# Patient Record
Sex: FEMALE | Race: WHITE | NOT HISPANIC OR LATINO | Employment: OTHER | URBAN - METROPOLITAN AREA
[De-identification: names, ages, dates, MRNs, and addresses within clinical notes are randomized per-mention and may not be internally consistent; named-entity substitution may affect disease eponyms.]

---

## 2017-04-27 ENCOUNTER — HOSPITAL ENCOUNTER (EMERGENCY)
Facility: HOSPITAL | Age: 82
Discharge: HOME | End: 2017-04-27
Attending: EMERGENCY MEDICINE | Admitting: EMERGENCY MEDICINE

## 2017-04-27 VITALS
HEIGHT: 62 IN | WEIGHT: 144 LBS | HEART RATE: 93 BPM | SYSTOLIC BLOOD PRESSURE: 166 MMHG | BODY MASS INDEX: 26.5 KG/M2 | DIASTOLIC BLOOD PRESSURE: 66 MMHG | OXYGEN SATURATION: 92 % | TEMPERATURE: 98.7 F | RESPIRATION RATE: 20 BRPM

## 2017-04-27 DIAGNOSIS — L27.0 DRUG RASH: ICD-10-CM

## 2017-04-27 DIAGNOSIS — N39.0 ACUTE UTI: Primary | ICD-10-CM

## 2017-04-27 LAB
ANION GAP SERPL CALCULATED.3IONS-SCNC: 16.1 MMOL/L
BACTERIA UR QL AUTO: ABNORMAL /HPF
BASOPHILS # BLD AUTO: 0.02 10*3/MM3 (ref 0–0.2)
BASOPHILS NFR BLD AUTO: 0.2 % (ref 0–2)
BILIRUB UR QL STRIP: ABNORMAL
BUN BLD-MCNC: 12 MG/DL (ref 8–23)
BUN/CREAT SERPL: 15 (ref 7–25)
CALCIUM SPEC-SCNC: 8.5 MG/DL (ref 8.8–10.5)
CHLORIDE SERPL-SCNC: 99 MMOL/L (ref 98–107)
CLARITY UR: ABNORMAL
CO2 SERPL-SCNC: 22.9 MMOL/L (ref 22–29)
COLOR UR: YELLOW
CREAT BLD-MCNC: 0.8 MG/DL (ref 0.57–1)
DEPRECATED RDW RBC AUTO: 42.6 FL (ref 37–54)
EOSINOPHIL # BLD AUTO: 0.47 10*3/MM3 (ref 0.1–0.3)
EOSINOPHIL NFR BLD AUTO: 4.9 % (ref 0–4)
ERYTHROCYTE [DISTWIDTH] IN BLOOD BY AUTOMATED COUNT: 13 % (ref 11.5–14.5)
GFR SERPL CREATININE-BSD FRML MDRD: 68 ML/MIN/1.73
GLUCOSE BLD-MCNC: 99 MG/DL (ref 65–99)
GLUCOSE UR STRIP-MCNC: NEGATIVE MG/DL
HCT VFR BLD AUTO: 39.1 % (ref 37–47)
HGB BLD-MCNC: 13 G/DL (ref 12–16)
HGB UR QL STRIP.AUTO: ABNORMAL
HYALINE CASTS UR QL AUTO: ABNORMAL /LPF
IMM GRANULOCYTES # BLD: 0.07 10*3/MM3 (ref 0–0.03)
IMM GRANULOCYTES NFR BLD: 0.7 % (ref 0–0.5)
KETONES UR QL STRIP: ABNORMAL
LEUKOCYTE ESTERASE UR QL STRIP.AUTO: ABNORMAL
LYMPHOCYTES # BLD AUTO: 0.32 10*3/MM3 (ref 0.6–4.8)
LYMPHOCYTES NFR BLD AUTO: 3.3 % (ref 20–45)
MCH RBC QN AUTO: 30 PG (ref 27–31)
MCHC RBC AUTO-ENTMCNC: 33.2 G/DL (ref 31–37)
MCV RBC AUTO: 90.1 FL (ref 81–99)
MONOCYTES # BLD AUTO: 0.36 10*3/MM3 (ref 0–1)
MONOCYTES NFR BLD AUTO: 3.7 % (ref 3–8)
NEUTROPHILS # BLD AUTO: 8.39 10*3/MM3 (ref 1.5–8.3)
NEUTROPHILS NFR BLD AUTO: 87.2 % (ref 45–70)
NITRITE UR QL STRIP: NEGATIVE
NRBC BLD MANUAL-RTO: 0 /100 WBC (ref 0–0)
PH UR STRIP.AUTO: 5.5 [PH] (ref 4.5–8)
PLATELET # BLD AUTO: 216 10*3/MM3 (ref 140–500)
PMV BLD AUTO: 9.9 FL (ref 7.4–10.4)
POTASSIUM BLD-SCNC: 4.2 MMOL/L (ref 3.5–5.2)
PROT UR QL STRIP: ABNORMAL
RBC # BLD AUTO: 4.34 10*6/MM3 (ref 4.2–5.4)
RBC # UR: ABNORMAL /HPF
REF LAB TEST METHOD: ABNORMAL
SODIUM BLD-SCNC: 138 MMOL/L (ref 136–145)
SP GR UR STRIP: 1.02 (ref 1–1.03)
SQUAMOUS #/AREA URNS HPF: ABNORMAL /HPF
UROBILINOGEN UR QL STRIP: ABNORMAL
WBC NRBC COR # BLD: 9.63 10*3/MM3 (ref 4.8–10.8)
WBC UR QL AUTO: ABNORMAL /HPF

## 2017-04-27 PROCEDURE — 80048 BASIC METABOLIC PNL TOTAL CA: CPT | Performed by: EMERGENCY MEDICINE

## 2017-04-27 PROCEDURE — 25010000002 ONDANSETRON PER 1 MG: Performed by: EMERGENCY MEDICINE

## 2017-04-27 PROCEDURE — 96375 TX/PRO/DX INJ NEW DRUG ADDON: CPT

## 2017-04-27 PROCEDURE — 96365 THER/PROPH/DIAG IV INF INIT: CPT

## 2017-04-27 PROCEDURE — 99284 EMERGENCY DEPT VISIT MOD MDM: CPT | Performed by: EMERGENCY MEDICINE

## 2017-04-27 PROCEDURE — 85025 COMPLETE CBC W/AUTO DIFF WBC: CPT | Performed by: EMERGENCY MEDICINE

## 2017-04-27 PROCEDURE — 87086 URINE CULTURE/COLONY COUNT: CPT | Performed by: EMERGENCY MEDICINE

## 2017-04-27 PROCEDURE — 25010000002 CEFTRIAXONE PER 250 MG: Performed by: EMERGENCY MEDICINE

## 2017-04-27 PROCEDURE — 99283 EMERGENCY DEPT VISIT LOW MDM: CPT

## 2017-04-27 PROCEDURE — 81001 URINALYSIS AUTO W/SCOPE: CPT | Performed by: EMERGENCY MEDICINE

## 2017-04-27 PROCEDURE — 96361 HYDRATE IV INFUSION ADD-ON: CPT

## 2017-04-27 RX ORDER — NITROFURANTOIN MACROCRYSTALS 100 MG/1
100 CAPSULE ORAL EVERY 12 HOURS
COMMUNITY
End: 2017-05-04 | Stop reason: HOSPADM

## 2017-04-27 RX ORDER — ESOMEPRAZOLE MAGNESIUM 40 MG/1
20 CAPSULE, DELAYED RELEASE ORAL NIGHTLY
COMMUNITY
End: 2018-03-02 | Stop reason: ALTCHOICE

## 2017-04-27 RX ORDER — CEFUROXIME AXETIL 500 MG/1
500 TABLET ORAL 2 TIMES DAILY
Qty: 14 TABLET | Refills: 0 | Status: SHIPPED | OUTPATIENT
Start: 2017-04-27 | End: 2017-05-04 | Stop reason: HOSPADM

## 2017-04-27 RX ORDER — SODIUM CHLORIDE 0.9 % (FLUSH) 0.9 %
10 SYRINGE (ML) INJECTION AS NEEDED
Status: DISCONTINUED | OUTPATIENT
Start: 2017-04-27 | End: 2017-04-27 | Stop reason: HOSPADM

## 2017-04-27 RX ORDER — ONDANSETRON 2 MG/ML
4 INJECTION INTRAMUSCULAR; INTRAVENOUS ONCE
Status: COMPLETED | OUTPATIENT
Start: 2017-04-27 | End: 2017-04-27

## 2017-04-27 RX ORDER — ONDANSETRON 4 MG/1
4 TABLET, ORALLY DISINTEGRATING ORAL EVERY 6 HOURS PRN
Qty: 20 TABLET | Refills: 0 | Status: SHIPPED | OUTPATIENT
Start: 2017-04-27 | End: 2017-05-20 | Stop reason: HOSPADM

## 2017-04-27 RX ADMIN — CEFTRIAXONE 1 G: 1 INJECTION, SOLUTION INTRAVENOUS at 12:16

## 2017-04-27 RX ADMIN — SODIUM CHLORIDE 1000 ML: 9 INJECTION, SOLUTION INTRAVENOUS at 12:13

## 2017-04-27 RX ADMIN — ONDANSETRON 4 MG: 2 INJECTION, SOLUTION INTRAMUSCULAR; INTRAVENOUS at 12:14

## 2017-04-29 LAB — BACTERIA SPEC AEROBE CULT: NORMAL

## 2017-05-01 ENCOUNTER — APPOINTMENT (OUTPATIENT)
Dept: ULTRASOUND IMAGING | Facility: HOSPITAL | Age: 82
End: 2017-05-01

## 2017-05-01 ENCOUNTER — HOSPITAL ENCOUNTER (INPATIENT)
Facility: HOSPITAL | Age: 82
LOS: 3 days | Discharge: NURSING, REHAB OR LONG-TERM CARE | End: 2017-05-04
Attending: EMERGENCY MEDICINE | Admitting: HOSPITALIST

## 2017-05-01 ENCOUNTER — APPOINTMENT (OUTPATIENT)
Dept: CT IMAGING | Facility: HOSPITAL | Age: 82
End: 2017-05-01

## 2017-05-01 DIAGNOSIS — R13.12 OROPHARYNGEAL DYSPHAGIA: ICD-10-CM

## 2017-05-01 DIAGNOSIS — I63.9 CEREBROVASCULAR ACCIDENT (CVA), UNSPECIFIED MECHANISM (HCC): Primary | ICD-10-CM

## 2017-05-01 LAB
ALBUMIN SERPL-MCNC: 3.3 G/DL (ref 3.5–5.2)
ALBUMIN/GLOB SERPL: 1.1 G/DL
ALP SERPL-CCNC: 63 U/L (ref 40–129)
ALT SERPL W P-5'-P-CCNC: 23 U/L (ref 5–33)
ANION GAP SERPL CALCULATED.3IONS-SCNC: 12.8 MMOL/L
ANISOCYTOSIS BLD QL: NORMAL
AST SERPL-CCNC: 21 U/L (ref 5–32)
BACTERIA UR QL AUTO: ABNORMAL /HPF
BASOPHILS # BLD AUTO: 0.05 10*3/MM3 (ref 0–0.2)
BASOPHILS NFR BLD AUTO: 0.5 % (ref 0–2)
BILIRUB SERPL-MCNC: 0.3 MG/DL (ref 0.2–1.2)
BILIRUB UR QL STRIP: NEGATIVE
BUN BLD-MCNC: 16 MG/DL (ref 8–23)
BUN/CREAT SERPL: 22.9 (ref 7–25)
CALCIUM SPEC-SCNC: 8.5 MG/DL (ref 8.8–10.5)
CHLORIDE SERPL-SCNC: 101 MMOL/L (ref 98–107)
CLARITY UR: CLEAR
CO2 SERPL-SCNC: 27.2 MMOL/L (ref 22–29)
COD CRY URNS QL: ABNORMAL /HPF
COLOR UR: YELLOW
CREAT BLD-MCNC: 0.7 MG/DL (ref 0.57–1)
DEPRECATED RDW RBC AUTO: 43.3 FL (ref 37–54)
EOSINOPHIL # BLD AUTO: 1.4 10*3/MM3 (ref 0.1–0.3)
EOSINOPHIL NFR BLD AUTO: 14.7 % (ref 0–4)
ERYTHROCYTE [DISTWIDTH] IN BLOOD BY AUTOMATED COUNT: 13.1 % (ref 11.5–14.5)
GFR SERPL CREATININE-BSD FRML MDRD: 79 ML/MIN/1.73
GLOBULIN UR ELPH-MCNC: 2.9 GM/DL
GLUCOSE BLD-MCNC: 97 MG/DL (ref 65–99)
GLUCOSE BLDC GLUCOMTR-MCNC: 91 MG/DL (ref 70–130)
GLUCOSE UR STRIP-MCNC: NEGATIVE MG/DL
HCT VFR BLD AUTO: 35 % (ref 37–47)
HGB BLD-MCNC: 11.5 G/DL (ref 12–16)
HGB UR QL STRIP.AUTO: NEGATIVE
HYALINE CASTS UR QL AUTO: ABNORMAL /LPF
IMM GRANULOCYTES # BLD: 0.11 10*3/MM3 (ref 0–0.03)
IMM GRANULOCYTES NFR BLD: 1.2 % (ref 0–0.5)
KETONES UR QL STRIP: NEGATIVE
LEUKOCYTE ESTERASE UR QL STRIP.AUTO: NEGATIVE
LYMPHOCYTES # BLD AUTO: 2.02 10*3/MM3 (ref 0.6–4.8)
LYMPHOCYTES NFR BLD AUTO: 21.2 % (ref 20–45)
MCH RBC QN AUTO: 29.8 PG (ref 27–31)
MCHC RBC AUTO-ENTMCNC: 32.9 G/DL (ref 31–37)
MCV RBC AUTO: 90.7 FL (ref 81–99)
MONOCYTES # BLD AUTO: 0.73 10*3/MM3 (ref 0–1)
MONOCYTES NFR BLD AUTO: 7.7 % (ref 3–8)
NEUTROPHILS # BLD AUTO: 5.21 10*3/MM3 (ref 1.5–8.3)
NEUTROPHILS NFR BLD AUTO: 54.7 % (ref 45–70)
NITRITE UR QL STRIP: NEGATIVE
NRBC BLD MANUAL-RTO: 0 /100 WBC (ref 0–0)
PH UR STRIP.AUTO: 5.5 [PH] (ref 4.5–8)
PLAT MORPH BLD: NORMAL
PLATELET # BLD AUTO: 246 10*3/MM3 (ref 140–500)
PMV BLD AUTO: 10.1 FL (ref 7.4–10.4)
POTASSIUM BLD-SCNC: 3.6 MMOL/L (ref 3.5–5.2)
PROT SERPL-MCNC: 6.2 G/DL (ref 6–8.5)
PROT UR QL STRIP: ABNORMAL
RBC # BLD AUTO: 3.86 10*6/MM3 (ref 4.2–5.4)
RBC # UR: ABNORMAL /HPF
REF LAB TEST METHOD: ABNORMAL
SODIUM BLD-SCNC: 141 MMOL/L (ref 136–145)
SP GR UR STRIP: 1.02 (ref 1–1.03)
SQUAMOUS #/AREA URNS HPF: ABNORMAL /HPF
UROBILINOGEN UR QL STRIP: ABNORMAL
WBC MORPH BLD: NORMAL
WBC NRBC COR # BLD: 9.52 10*3/MM3 (ref 4.8–10.8)
WBC UR QL AUTO: ABNORMAL /HPF

## 2017-05-01 PROCEDURE — 81001 URINALYSIS AUTO W/SCOPE: CPT | Performed by: EMERGENCY MEDICINE

## 2017-05-01 PROCEDURE — G0378 HOSPITAL OBSERVATION PER HR: HCPCS

## 2017-05-01 PROCEDURE — 99284 EMERGENCY DEPT VISIT MOD MDM: CPT

## 2017-05-01 PROCEDURE — 82962 GLUCOSE BLOOD TEST: CPT

## 2017-05-01 PROCEDURE — 85025 COMPLETE CBC W/AUTO DIFF WBC: CPT | Performed by: EMERGENCY MEDICINE

## 2017-05-01 PROCEDURE — 85007 BL SMEAR W/DIFF WBC COUNT: CPT | Performed by: EMERGENCY MEDICINE

## 2017-05-01 PROCEDURE — 70450 CT HEAD/BRAIN W/O DYE: CPT

## 2017-05-01 PROCEDURE — 93005 ELECTROCARDIOGRAM TRACING: CPT | Performed by: EMERGENCY MEDICINE

## 2017-05-01 PROCEDURE — 99284 EMERGENCY DEPT VISIT MOD MDM: CPT | Performed by: EMERGENCY MEDICINE

## 2017-05-01 PROCEDURE — 80053 COMPREHEN METABOLIC PANEL: CPT | Performed by: EMERGENCY MEDICINE

## 2017-05-01 PROCEDURE — 99219 PR INITIAL OBSERVATION CARE/DAY 50 MINUTES: CPT | Performed by: HOSPITALIST

## 2017-05-01 PROCEDURE — 94799 UNLISTED PULMONARY SVC/PX: CPT

## 2017-05-01 PROCEDURE — 93970 EXTREMITY STUDY: CPT

## 2017-05-01 PROCEDURE — 93010 ELECTROCARDIOGRAM REPORT: CPT | Performed by: INTERNAL MEDICINE

## 2017-05-01 RX ORDER — CARVEDILOL 3.12 MG/1
3.12 TABLET ORAL EVERY 12 HOURS SCHEDULED
Status: DISCONTINUED | OUTPATIENT
Start: 2017-05-01 | End: 2017-05-04 | Stop reason: HOSPADM

## 2017-05-01 RX ORDER — SODIUM CHLORIDE 0.9 % (FLUSH) 0.9 %
1-10 SYRINGE (ML) INJECTION AS NEEDED
Status: DISCONTINUED | OUTPATIENT
Start: 2017-05-01 | End: 2017-05-04 | Stop reason: HOSPADM

## 2017-05-01 RX ORDER — CEFUROXIME AXETIL 250 MG/1
500 TABLET ORAL EVERY 12 HOURS SCHEDULED
Status: COMPLETED | OUTPATIENT
Start: 2017-05-01 | End: 2017-05-04

## 2017-05-01 RX ORDER — ASPIRIN 300 MG/1
300 SUPPOSITORY RECTAL DAILY
Status: DISCONTINUED | OUTPATIENT
Start: 2017-05-02 | End: 2017-05-01

## 2017-05-01 RX ORDER — ATORVASTATIN CALCIUM 20 MG/1
20 TABLET, FILM COATED ORAL NIGHTLY
Status: DISCONTINUED | OUTPATIENT
Start: 2017-05-01 | End: 2017-05-02

## 2017-05-01 RX ORDER — ASPIRIN 325 MG
325 TABLET ORAL ONCE
Status: COMPLETED | OUTPATIENT
Start: 2017-05-01 | End: 2017-05-01

## 2017-05-01 RX ORDER — ASPIRIN 325 MG
325 TABLET ORAL DAILY
Status: DISCONTINUED | OUTPATIENT
Start: 2017-05-02 | End: 2017-05-02

## 2017-05-01 RX ORDER — LEVOTHYROXINE SODIUM 0.07 MG/1
75 TABLET ORAL
Status: DISCONTINUED | OUTPATIENT
Start: 2017-05-02 | End: 2017-05-04 | Stop reason: HOSPADM

## 2017-05-01 RX ORDER — SODIUM CHLORIDE 0.9 % (FLUSH) 0.9 %
10 SYRINGE (ML) INJECTION AS NEEDED
Status: DISCONTINUED | OUTPATIENT
Start: 2017-05-01 | End: 2017-05-04 | Stop reason: HOSPADM

## 2017-05-01 RX ADMIN — ATORVASTATIN CALCIUM 20 MG: 20 TABLET, FILM COATED ORAL at 22:26

## 2017-05-01 RX ADMIN — CEFUROXIME AXETIL 500 MG: 250 TABLET ORAL at 22:26

## 2017-05-01 RX ADMIN — CARVEDILOL 3.12 MG: 3.12 TABLET, FILM COATED ORAL at 22:27

## 2017-05-01 RX ADMIN — ASPIRIN 325 MG: 325 TABLET, COATED ORAL at 13:22

## 2017-05-02 ENCOUNTER — APPOINTMENT (OUTPATIENT)
Dept: CARDIOLOGY | Facility: HOSPITAL | Age: 82
End: 2017-05-02
Attending: HOSPITALIST

## 2017-05-02 ENCOUNTER — APPOINTMENT (OUTPATIENT)
Dept: MRI IMAGING | Facility: HOSPITAL | Age: 82
End: 2017-05-02

## 2017-05-02 ENCOUNTER — APPOINTMENT (OUTPATIENT)
Dept: GENERAL RADIOLOGY | Facility: HOSPITAL | Age: 82
End: 2017-05-02

## 2017-05-02 LAB
AORTIC ARCH: 2.2 CM
ASCENDING AORTA: 2.7 CM
BH CV ECHO MEAS - ACS: 1.4 CM
BH CV ECHO MEAS - AI DEC SLOPE: 182 CM/SEC^2
BH CV ECHO MEAS - AI MAX PG: 53.6 MMHG
BH CV ECHO MEAS - AI MAX VEL: 366 CM/SEC
BH CV ECHO MEAS - AI P1/2T: 589 MSEC
BH CV ECHO MEAS - AO MAX PG (FULL): 5.4 MMHG
BH CV ECHO MEAS - AO MAX PG: 9.9 MMHG
BH CV ECHO MEAS - AO MEAN PG (FULL): 2 MMHG
BH CV ECHO MEAS - AO MEAN PG: 5 MMHG
BH CV ECHO MEAS - AO ROOT AREA (BSA CORRECTED): 1.9
BH CV ECHO MEAS - AO ROOT AREA: 8.6 CM^2
BH CV ECHO MEAS - AO ROOT DIAM: 3.3 CM
BH CV ECHO MEAS - AO V2 MAX: 157 CM/SEC
BH CV ECHO MEAS - AO V2 MEAN: 109 CM/SEC
BH CV ECHO MEAS - AO V2 VTI: 38 CM
BH CV ECHO MEAS - ASC AORTA: 2.7 CM
BH CV ECHO MEAS - AVA(I,A): 2.5 CM^2
BH CV ECHO MEAS - AVA(I,D): 2.5 CM^2
BH CV ECHO MEAS - AVA(V,A): 2.3 CM^2
BH CV ECHO MEAS - AVA(V,D): 2.3 CM^2
BH CV ECHO MEAS - BSA(HAYCOCK): 1.8 M^2
BH CV ECHO MEAS - BSA: 1.7 M^2
BH CV ECHO MEAS - BZI_BMI: 29.3 KILOGRAMS/M^2
BH CV ECHO MEAS - BZI_METRIC_HEIGHT: 154.9 CM
BH CV ECHO MEAS - BZI_METRIC_WEIGHT: 70.3 KG
BH CV ECHO MEAS - CONTRAST EF (2CH): 61.4 ML/M^2
BH CV ECHO MEAS - CONTRAST EF 4CH: 60.7 ML/M^2
BH CV ECHO MEAS - EDV(CUBED): 66.4 ML
BH CV ECHO MEAS - EDV(MOD-SP2): 52.8 ML
BH CV ECHO MEAS - EDV(MOD-SP4): 61.4 ML
BH CV ECHO MEAS - EDV(TEICH): 72.1 ML
BH CV ECHO MEAS - EF(CUBED): 77.3 %
BH CV ECHO MEAS - EF(MOD-SP2): 61.4 %
BH CV ECHO MEAS - EF(MOD-SP4): 60.7 %
BH CV ECHO MEAS - EF(TEICH): 70 %
BH CV ECHO MEAS - ESV(CUBED): 15.1 ML
BH CV ECHO MEAS - ESV(MOD-SP2): 20.4 ML
BH CV ECHO MEAS - ESV(MOD-SP4): 24.1 ML
BH CV ECHO MEAS - ESV(TEICH): 21.7 ML
BH CV ECHO MEAS - FS: 39 %
BH CV ECHO MEAS - IVS/LVPW: 0.9
BH CV ECHO MEAS - IVSD: 0.91 CM
BH CV ECHO MEAS - LAT PEAK E' VEL: 7 CM/SEC
BH CV ECHO MEAS - LV DIASTOLIC VOL/BSA (35-75): 36.2 ML/M^2
BH CV ECHO MEAS - LV MASS(C)D: 122.3 GRAMS
BH CV ECHO MEAS - LV MASS(C)DI: 72.1 GRAMS/M^2
BH CV ECHO MEAS - LV MAX PG: 4.4 MMHG
BH CV ECHO MEAS - LV MEAN PG: 3 MMHG
BH CV ECHO MEAS - LV SYSTOLIC VOL/BSA (12-30): 14.2 ML/M^2
BH CV ECHO MEAS - LV V1 MAX: 105 CM/SEC
BH CV ECHO MEAS - LV V1 MEAN: 75.8 CM/SEC
BH CV ECHO MEAS - LV V1 VTI: 27.5 CM
BH CV ECHO MEAS - LVIDD: 4.1 CM
BH CV ECHO MEAS - LVIDS: 2.5 CM
BH CV ECHO MEAS - LVLD AP2: 5.8 CM
BH CV ECHO MEAS - LVLD AP4: 6.2 CM
BH CV ECHO MEAS - LVLS AP2: 5.3 CM
BH CV ECHO MEAS - LVLS AP4: 5.1 CM
BH CV ECHO MEAS - LVOT AREA (M): 3.5 CM^2
BH CV ECHO MEAS - LVOT AREA: 3.5 CM^2
BH CV ECHO MEAS - LVOT DIAM: 2.1 CM
BH CV ECHO MEAS - LVPWD: 1 CM
BH CV ECHO MEAS - MED PEAK E' VEL: 7 CM/SEC
BH CV ECHO MEAS - MV A DUR: 0.11 SEC
BH CV ECHO MEAS - MV A MAX VEL: 103 CM/SEC
BH CV ECHO MEAS - MV DEC SLOPE: 579 CM/SEC^2
BH CV ECHO MEAS - MV DEC TIME: 0.21 SEC
BH CV ECHO MEAS - MV E MAX VEL: 119 CM/SEC
BH CV ECHO MEAS - MV E/A: 1.2
BH CV ECHO MEAS - MV MAX PG: 7.2 MMHG
BH CV ECHO MEAS - MV MEAN PG: 3 MMHG
BH CV ECHO MEAS - MV P1/2T MAX VEL: 144 CM/SEC
BH CV ECHO MEAS - MV P1/2T: 72.8 MSEC
BH CV ECHO MEAS - MV V2 MAX: 134 CM/SEC
BH CV ECHO MEAS - MV V2 MEAN: 70.6 CM/SEC
BH CV ECHO MEAS - MV V2 VTI: 41.3 CM
BH CV ECHO MEAS - MVA P1/2T LCG: 1.5 CM^2
BH CV ECHO MEAS - MVA(P1/2T): 3 CM^2
BH CV ECHO MEAS - MVA(VTI): 2.3 CM^2
BH CV ECHO MEAS - PA ACC TIME: 0.09 SEC
BH CV ECHO MEAS - PA MAX PG (FULL): 1.1 MMHG
BH CV ECHO MEAS - PA MAX PG: 2.6 MMHG
BH CV ECHO MEAS - PA PR(ACCEL): 37.6 MMHG
BH CV ECHO MEAS - PA V2 MAX: 80.1 CM/SEC
BH CV ECHO MEAS - PULM A REVS DUR: 0.1 SEC
BH CV ECHO MEAS - PULM A REVS VEL: 25.2 CM/SEC
BH CV ECHO MEAS - PULM DIAS VEL: 45 CM/SEC
BH CV ECHO MEAS - PULM S/D: 1.3
BH CV ECHO MEAS - PULM SYS VEL: 59.3 CM/SEC
BH CV ECHO MEAS - PVA(V,A): 3.2 CM^2
BH CV ECHO MEAS - PVA(V,D): 3.2 CM^2
BH CV ECHO MEAS - QP/QS: 0.58
BH CV ECHO MEAS - RAP SYSTOLE: 3 MMHG
BH CV ECHO MEAS - RV MAX PG: 1.5 MMHG
BH CV ECHO MEAS - RV MEAN PG: 1 MMHG
BH CV ECHO MEAS - RV V1 MAX: 61.3 CM/SEC
BH CV ECHO MEAS - RV V1 MEAN: 44.3 CM/SEC
BH CV ECHO MEAS - RV V1 VTI: 13.3 CM
BH CV ECHO MEAS - RVOT AREA: 4.2 CM^2
BH CV ECHO MEAS - RVOT DIAM: 2.3 CM
BH CV ECHO MEAS - RVSP: 28 MMHG
BH CV ECHO MEAS - SI(AO): 191.7 ML/M^2
BH CV ECHO MEAS - SI(CUBED): 30.3 ML/M^2
BH CV ECHO MEAS - SI(LVOT): 56.2 ML/M^2
BH CV ECHO MEAS - SI(MOD-SP2): 19.1 ML/M^2
BH CV ECHO MEAS - SI(MOD-SP4): 22 ML/M^2
BH CV ECHO MEAS - SI(TEICH): 29.8 ML/M^2
BH CV ECHO MEAS - SV(AO): 325 ML
BH CV ECHO MEAS - SV(CUBED): 51.4 ML
BH CV ECHO MEAS - SV(LVOT): 95.2 ML
BH CV ECHO MEAS - SV(MOD-SP2): 32.4 ML
BH CV ECHO MEAS - SV(MOD-SP4): 37.3 ML
BH CV ECHO MEAS - SV(RVOT): 55.3 ML
BH CV ECHO MEAS - SV(TEICH): 50.4 ML
BH CV ECHO MEAS - TAPSE (>1.6): 1.9 CM2
BH CV ECHO MEAS - TR MAX VEL: 250 CM/SEC
BH CV XLRA - RV BASE: 2.6 CM
BH CV XLRA - TDI S': 12 CM/SEC
CHOLEST SERPL-MCNC: 161 MG/DL (ref 0–200)
E/E' RATIO: 18
GLUCOSE BLDC GLUCOMTR-MCNC: 106 MG/DL (ref 70–130)
HBA1C MFR BLD: 5.4 % (ref 4.8–5.6)
HDLC SERPL-MCNC: 36 MG/DL (ref 40–60)
LDLC SERPL CALC-MCNC: 99 MG/DL (ref 0–100)
LDLC/HDLC SERPL: 2.74 {RATIO}
LEFT ATRIUM VOLUME INDEX: 15 ML/M2
TRIGL SERPL-MCNC: 131 MG/DL (ref 0–150)
VLDLC SERPL-MCNC: 26.2 MG/DL (ref 7–27)

## 2017-05-02 PROCEDURE — G8997 SWALLOW GOAL STATUS: HCPCS

## 2017-05-02 PROCEDURE — 70551 MRI BRAIN STEM W/O DYE: CPT

## 2017-05-02 PROCEDURE — 74230 X-RAY XM SWLNG FUNCJ C+: CPT

## 2017-05-02 PROCEDURE — G8998 SWALLOW D/C STATUS: HCPCS

## 2017-05-02 PROCEDURE — 80061 LIPID PANEL: CPT | Performed by: HOSPITALIST

## 2017-05-02 PROCEDURE — A9577 INJ MULTIHANCE: HCPCS | Performed by: HOSPITALIST

## 2017-05-02 PROCEDURE — 97166 OT EVAL MOD COMPLEX 45 MIN: CPT

## 2017-05-02 PROCEDURE — 99232 SBSQ HOSP IP/OBS MODERATE 35: CPT | Performed by: HOSPITALIST

## 2017-05-02 PROCEDURE — 0 GADOBENATE DIMEGLUMINE 529 MG/ML SOLUTION: Performed by: HOSPITALIST

## 2017-05-02 PROCEDURE — 25010000002 ENOXAPARIN PER 10 MG: Performed by: PSYCHIATRY & NEUROLOGY

## 2017-05-02 PROCEDURE — 93306 TTE W/DOPPLER COMPLETE: CPT

## 2017-05-02 PROCEDURE — 82962 GLUCOSE BLOOD TEST: CPT

## 2017-05-02 PROCEDURE — 92610 EVALUATE SWALLOWING FUNCTION: CPT

## 2017-05-02 PROCEDURE — 0399T ADULT TRANSTHORACIC ECHO COMPLETE: CPT | Performed by: INTERNAL MEDICINE

## 2017-05-02 PROCEDURE — 93306 TTE W/DOPPLER COMPLETE: CPT | Performed by: INTERNAL MEDICINE

## 2017-05-02 PROCEDURE — 83036 HEMOGLOBIN GLYCOSYLATED A1C: CPT | Performed by: HOSPITALIST

## 2017-05-02 PROCEDURE — 70544 MR ANGIOGRAPHY HEAD W/O DYE: CPT

## 2017-05-02 PROCEDURE — G8996 SWALLOW CURRENT STATUS: HCPCS

## 2017-05-02 PROCEDURE — 92611 MOTION FLUOROSCOPY/SWALLOW: CPT

## 2017-05-02 PROCEDURE — 97162 PT EVAL MOD COMPLEX 30 MIN: CPT

## 2017-05-02 PROCEDURE — 25010000002 ENOXAPARIN PER 10 MG

## 2017-05-02 PROCEDURE — 70549 MR ANGIOGRAPH NECK W/O&W/DYE: CPT

## 2017-05-02 PROCEDURE — 0399T HC MYOCARDL STRAIN IMAG QUAN ASSMT PER SESS: CPT

## 2017-05-02 RX ORDER — HYDROCODONE BITARTRATE AND ACETAMINOPHEN 5; 325 MG/1; MG/1
1 TABLET ORAL EVERY 6 HOURS PRN
Status: DISCONTINUED | OUTPATIENT
Start: 2017-05-02 | End: 2017-05-04 | Stop reason: HOSPADM

## 2017-05-02 RX ORDER — ACETAMINOPHEN 500 MG
TABLET ORAL
Status: COMPLETED
Start: 2017-05-02 | End: 2017-05-02

## 2017-05-02 RX ORDER — CLOPIDOGREL BISULFATE 75 MG/1
75 TABLET ORAL DAILY
Status: DISCONTINUED | OUTPATIENT
Start: 2017-05-02 | End: 2017-05-03

## 2017-05-02 RX ORDER — CLOPIDOGREL BISULFATE 75 MG/1
TABLET ORAL
Status: COMPLETED
Start: 2017-05-02 | End: 2017-05-02

## 2017-05-02 RX ORDER — ACETAMINOPHEN 500 MG
1000 TABLET ORAL EVERY 4 HOURS PRN
Status: DISCONTINUED | OUTPATIENT
Start: 2017-05-02 | End: 2017-05-02

## 2017-05-02 RX ADMIN — CLOPIDOGREL BISULFATE 75 MG: 75 TABLET, FILM COATED ORAL at 00:47

## 2017-05-02 RX ADMIN — ACETAMINOPHEN 1000 MG: 500 TABLET ORAL at 00:47

## 2017-05-02 RX ADMIN — ENOXAPARIN SODIUM 30 MG: 30 INJECTION SUBCUTANEOUS at 15:03

## 2017-05-02 RX ADMIN — Medication 1000 MG: at 00:47

## 2017-05-02 RX ADMIN — HYDROCODONE BITARTRATE AND ACETAMINOPHEN 1 TABLET: 5; 325 TABLET ORAL at 15:07

## 2017-05-02 RX ADMIN — ENOXAPARIN SODIUM 30 MG: 30 INJECTION SUBCUTANEOUS at 00:47

## 2017-05-02 RX ADMIN — GADOBENATE DIMEGLUMINE 14 ML: 529 INJECTION, SOLUTION INTRAVENOUS at 10:35

## 2017-05-02 RX ADMIN — CEFUROXIME AXETIL 500 MG: 250 TABLET ORAL at 22:23

## 2017-05-02 RX ADMIN — LEVOTHYROXINE SODIUM 75 MCG: 75 TABLET ORAL at 06:36

## 2017-05-02 RX ADMIN — CLOPIDOGREL BISULFATE 75 MG: 75 TABLET ORAL at 00:47

## 2017-05-02 RX ADMIN — CARVEDILOL 3.12 MG: 3.12 TABLET, FILM COATED ORAL at 11:54

## 2017-05-02 RX ADMIN — HYDROCODONE BITARTRATE AND ACETAMINOPHEN 1 TABLET: 5; 325 TABLET ORAL at 22:11

## 2017-05-02 RX ADMIN — CEFUROXIME AXETIL 500 MG: 250 TABLET ORAL at 11:54

## 2017-05-02 RX ADMIN — CARVEDILOL 3.12 MG: 3.12 TABLET, FILM COATED ORAL at 22:20

## 2017-05-03 ENCOUNTER — APPOINTMENT (OUTPATIENT)
Dept: CT IMAGING | Facility: HOSPITAL | Age: 82
End: 2017-05-03

## 2017-05-03 PROCEDURE — 25010000002 PROMETHAZINE PER 50 MG

## 2017-05-03 PROCEDURE — 92610 EVALUATE SWALLOWING FUNCTION: CPT

## 2017-05-03 PROCEDURE — 92523 SPEECH SOUND LANG COMPREHEN: CPT

## 2017-05-03 PROCEDURE — 97112 NEUROMUSCULAR REEDUCATION: CPT

## 2017-05-03 PROCEDURE — 99232 SBSQ HOSP IP/OBS MODERATE 35: CPT | Performed by: INTERNAL MEDICINE

## 2017-05-03 PROCEDURE — 97116 GAIT TRAINING THERAPY: CPT

## 2017-05-03 PROCEDURE — 70450 CT HEAD/BRAIN W/O DYE: CPT

## 2017-05-03 RX ORDER — CLOPIDOGREL BISULFATE 75 MG/1
75 TABLET ORAL DAILY
Status: DISCONTINUED | OUTPATIENT
Start: 2017-05-03 | End: 2017-05-04 | Stop reason: HOSPADM

## 2017-05-03 RX ORDER — PROMETHAZINE HYDROCHLORIDE 25 MG/ML
INJECTION, SOLUTION INTRAMUSCULAR; INTRAVENOUS
Status: COMPLETED
Start: 2017-05-03 | End: 2017-05-03

## 2017-05-03 RX ORDER — PROMETHAZINE HYDROCHLORIDE 25 MG/ML
6.25 INJECTION, SOLUTION INTRAMUSCULAR; INTRAVENOUS EVERY 4 HOURS PRN
Status: DISCONTINUED | OUTPATIENT
Start: 2017-05-03 | End: 2017-05-04 | Stop reason: HOSPADM

## 2017-05-03 RX ADMIN — PROMETHAZINE HYDROCHLORIDE 6.25 MG: 25 INJECTION INTRAMUSCULAR; INTRAVENOUS at 03:45

## 2017-05-03 RX ADMIN — CEFUROXIME AXETIL 500 MG: 250 TABLET ORAL at 20:28

## 2017-05-03 RX ADMIN — CEFUROXIME AXETIL 500 MG: 250 TABLET ORAL at 09:59

## 2017-05-03 RX ADMIN — CARVEDILOL 3.12 MG: 3.12 TABLET, FILM COATED ORAL at 20:28

## 2017-05-03 RX ADMIN — CARVEDILOL 3.12 MG: 3.12 TABLET, FILM COATED ORAL at 09:59

## 2017-05-03 RX ADMIN — CLOPIDOGREL 75 MG: 75 TABLET, FILM COATED ORAL at 13:28

## 2017-05-03 RX ADMIN — PROMETHAZINE HYDROCHLORIDE 6.25 MG: 25 INJECTION, SOLUTION INTRAMUSCULAR; INTRAVENOUS at 03:45

## 2017-05-04 ENCOUNTER — HOSPITAL ENCOUNTER (INPATIENT)
Facility: HOSPITAL | Age: 82
LOS: 16 days | Discharge: HOME HEALTH | End: 2017-05-20
Attending: INTERNAL MEDICINE | Admitting: INTERNAL MEDICINE

## 2017-05-04 VITALS
BODY MASS INDEX: 29.32 KG/M2 | WEIGHT: 155.3 LBS | OXYGEN SATURATION: 97 % | TEMPERATURE: 96.9 F | HEIGHT: 61 IN | DIASTOLIC BLOOD PRESSURE: 70 MMHG | RESPIRATION RATE: 18 BRPM | HEART RATE: 64 BPM | SYSTOLIC BLOOD PRESSURE: 158 MMHG

## 2017-05-04 LAB
ALBUMIN SERPL-MCNC: 3.2 G/DL (ref 3.5–5.2)
ALBUMIN/GLOB SERPL: 1.2 G/DL
ALP SERPL-CCNC: 60 U/L (ref 40–129)
ALT SERPL W P-5'-P-CCNC: 20 U/L (ref 5–33)
ANION GAP SERPL CALCULATED.3IONS-SCNC: 11.6 MMOL/L
AST SERPL-CCNC: 23 U/L (ref 5–32)
BASOPHILS # BLD AUTO: 0.11 10*3/MM3 (ref 0–0.2)
BASOPHILS NFR BLD AUTO: 0.9 % (ref 0–2)
BILIRUB SERPL-MCNC: 0.3 MG/DL (ref 0.2–1.2)
BUN BLD-MCNC: 11 MG/DL (ref 8–23)
BUN/CREAT SERPL: 18 (ref 7–25)
CALCIUM SPEC-SCNC: 8.6 MG/DL (ref 8.8–10.5)
CHLORIDE SERPL-SCNC: 101 MMOL/L (ref 98–107)
CO2 SERPL-SCNC: 26.4 MMOL/L (ref 22–29)
CREAT BLD-MCNC: 0.61 MG/DL (ref 0.57–1)
DEPRECATED RDW RBC AUTO: 41.9 FL (ref 37–54)
EOSINOPHIL # BLD AUTO: 1.16 10*3/MM3 (ref 0.1–0.3)
EOSINOPHIL NFR BLD AUTO: 9.7 % (ref 0–4)
ERYTHROCYTE [DISTWIDTH] IN BLOOD BY AUTOMATED COUNT: 12.9 % (ref 11.5–14.5)
GFR SERPL CREATININE-BSD FRML MDRD: 93 ML/MIN/1.73
GLOBULIN UR ELPH-MCNC: 2.6 GM/DL
GLUCOSE BLD-MCNC: 92 MG/DL (ref 65–99)
GLUCOSE BLDC GLUCOMTR-MCNC: 163 MG/DL (ref 70–130)
GLUCOSE BLDC GLUCOMTR-MCNC: 88 MG/DL (ref 70–130)
GLUCOSE BLDC GLUCOMTR-MCNC: 99 MG/DL (ref 70–130)
HCT VFR BLD AUTO: 33.1 % (ref 37–47)
HGB BLD-MCNC: 11.1 G/DL (ref 12–16)
IMM GRANULOCYTES # BLD: 0.43 10*3/MM3 (ref 0–0.03)
IMM GRANULOCYTES NFR BLD: 3.6 % (ref 0–0.5)
LYMPHOCYTES # BLD AUTO: 3.67 10*3/MM3 (ref 0.6–4.8)
LYMPHOCYTES NFR BLD AUTO: 30.7 % (ref 20–45)
MCH RBC QN AUTO: 30 PG (ref 27–31)
MCHC RBC AUTO-ENTMCNC: 33.5 G/DL (ref 31–37)
MCV RBC AUTO: 89.5 FL (ref 81–99)
MONOCYTES # BLD AUTO: 0.89 10*3/MM3 (ref 0–1)
MONOCYTES NFR BLD AUTO: 7.4 % (ref 3–8)
NEUTROPHILS # BLD AUTO: 5.7 10*3/MM3 (ref 1.5–8.3)
NEUTROPHILS NFR BLD AUTO: 47.7 % (ref 45–70)
NRBC BLD MANUAL-RTO: 0 /100 WBC (ref 0–0)
PLATELET # BLD AUTO: 283 10*3/MM3 (ref 140–500)
PMV BLD AUTO: 9.8 FL (ref 7.4–10.4)
POTASSIUM BLD-SCNC: 3.8 MMOL/L (ref 3.5–5.2)
PROT SERPL-MCNC: 5.8 G/DL (ref 6–8.5)
RBC # BLD AUTO: 3.7 10*6/MM3 (ref 4.2–5.4)
SODIUM BLD-SCNC: 139 MMOL/L (ref 136–145)
WBC NRBC COR # BLD: 11.96 10*3/MM3 (ref 4.8–10.8)

## 2017-05-04 PROCEDURE — 97530 THERAPEUTIC ACTIVITIES: CPT

## 2017-05-04 PROCEDURE — 97110 THERAPEUTIC EXERCISES: CPT

## 2017-05-04 PROCEDURE — 97116 GAIT TRAINING THERAPY: CPT

## 2017-05-04 PROCEDURE — 99238 HOSP IP/OBS DSCHRG MGMT 30/<: CPT | Performed by: INTERNAL MEDICINE

## 2017-05-04 PROCEDURE — 82306 VITAMIN D 25 HYDROXY: CPT | Performed by: NURSE PRACTITIONER

## 2017-05-04 PROCEDURE — 84443 ASSAY THYROID STIM HORMONE: CPT | Performed by: NURSE PRACTITIONER

## 2017-05-04 PROCEDURE — 82962 GLUCOSE BLOOD TEST: CPT

## 2017-05-04 PROCEDURE — 85025 COMPLETE CBC W/AUTO DIFF WBC: CPT | Performed by: INTERNAL MEDICINE

## 2017-05-04 PROCEDURE — 99221 1ST HOSP IP/OBS SF/LOW 40: CPT | Performed by: INTERNAL MEDICINE

## 2017-05-04 PROCEDURE — 80053 COMPREHEN METABOLIC PANEL: CPT | Performed by: INTERNAL MEDICINE

## 2017-05-04 RX ORDER — CLOPIDOGREL BISULFATE 75 MG/1
75 TABLET ORAL DAILY
Qty: 30 TABLET
Start: 2017-05-04 | End: 2017-05-20 | Stop reason: HOSPADM

## 2017-05-04 RX ORDER — ONDANSETRON 4 MG/1
4 TABLET, ORALLY DISINTEGRATING ORAL EVERY 6 HOURS PRN
Status: CANCELLED | OUTPATIENT
Start: 2017-05-04

## 2017-05-04 RX ORDER — CARVEDILOL 3.12 MG/1
3.12 TABLET ORAL 2 TIMES DAILY
Status: CANCELLED | OUTPATIENT
Start: 2017-05-04

## 2017-05-04 RX ORDER — MELOXICAM 7.5 MG/1
7.5 TABLET ORAL DAILY
Status: CANCELLED | OUTPATIENT
Start: 2017-05-04

## 2017-05-04 RX ORDER — LEVOTHYROXINE SODIUM 0.07 MG/1
75 TABLET ORAL
Status: DISCONTINUED | OUTPATIENT
Start: 2017-05-05 | End: 2017-05-05

## 2017-05-04 RX ORDER — PANTOPRAZOLE SODIUM 40 MG/1
40 TABLET, DELAYED RELEASE ORAL
Status: CANCELLED | OUTPATIENT
Start: 2017-05-05

## 2017-05-04 RX ORDER — HYDROCODONE BITARTRATE AND ACETAMINOPHEN 5; 325 MG/1; MG/1
1 TABLET ORAL EVERY 6 HOURS PRN
Refills: 0
Start: 2017-05-04 | End: 2017-05-20 | Stop reason: HOSPADM

## 2017-05-04 RX ORDER — HYDROCODONE BITARTRATE AND ACETAMINOPHEN 5; 325 MG/1; MG/1
1 TABLET ORAL EVERY 6 HOURS PRN
Status: ACTIVE | OUTPATIENT
Start: 2017-05-04 | End: 2017-05-12

## 2017-05-04 RX ORDER — CARVEDILOL 3.12 MG/1
3.12 TABLET ORAL EVERY 12 HOURS SCHEDULED
Status: DISCONTINUED | OUTPATIENT
Start: 2017-05-04 | End: 2017-05-20 | Stop reason: HOSPADM

## 2017-05-04 RX ORDER — LEVOTHYROXINE SODIUM 0.07 MG/1
75 TABLET ORAL DAILY
Status: CANCELLED | OUTPATIENT
Start: 2017-05-04

## 2017-05-04 RX ORDER — ATORVASTATIN CALCIUM 20 MG/1
20 TABLET, FILM COATED ORAL DAILY
Status: CANCELLED | OUTPATIENT
Start: 2017-05-04

## 2017-05-04 RX ORDER — PROMETHAZINE HYDROCHLORIDE 25 MG/ML
6.25 INJECTION, SOLUTION INTRAMUSCULAR; INTRAVENOUS EVERY 4 HOURS PRN
Status: DISCONTINUED | OUTPATIENT
Start: 2017-05-04 | End: 2017-05-20 | Stop reason: HOSPADM

## 2017-05-04 RX ORDER — MELATONIN
1000 DAILY
Status: CANCELLED | OUTPATIENT
Start: 2017-05-04

## 2017-05-04 RX ORDER — CLOPIDOGREL BISULFATE 75 MG/1
75 TABLET ORAL DAILY
Status: DISCONTINUED | OUTPATIENT
Start: 2017-05-05 | End: 2017-05-20 | Stop reason: HOSPADM

## 2017-05-04 RX ORDER — VITAMIN B COMPLEX
1 CAPSULE ORAL DAILY
Status: CANCELLED | OUTPATIENT
Start: 2017-05-04

## 2017-05-04 RX ORDER — FUROSEMIDE 40 MG/1
40 TABLET ORAL DAILY
Status: CANCELLED | OUTPATIENT
Start: 2017-05-04

## 2017-05-04 RX ORDER — CLOPIDOGREL BISULFATE 75 MG/1
75 TABLET ORAL DAILY
Status: CANCELLED | OUTPATIENT
Start: 2017-05-04

## 2017-05-04 RX ORDER — HYDROCODONE BITARTRATE AND ACETAMINOPHEN 5; 325 MG/1; MG/1
1 TABLET ORAL EVERY 6 HOURS PRN
Status: CANCELLED | OUTPATIENT
Start: 2017-05-04 | End: 2017-05-12

## 2017-05-04 RX ADMIN — CARVEDILOL 3.12 MG: 3.12 TABLET, FILM COATED ORAL at 21:30

## 2017-05-04 RX ADMIN — CLOPIDOGREL 75 MG: 75 TABLET, FILM COATED ORAL at 08:13

## 2017-05-04 RX ADMIN — LEVOTHYROXINE SODIUM 75 MCG: 75 TABLET ORAL at 06:22

## 2017-05-04 RX ADMIN — CEFUROXIME AXETIL 500 MG: 250 TABLET ORAL at 08:13

## 2017-05-04 RX ADMIN — CARVEDILOL 3.12 MG: 3.12 TABLET, FILM COATED ORAL at 08:13

## 2017-05-05 LAB
25(OH)D3 SERPL-MCNC: 17 NG/ML
GLUCOSE BLDC GLUCOMTR-MCNC: 138 MG/DL (ref 70–130)
GLUCOSE BLDC GLUCOMTR-MCNC: 92 MG/DL (ref 70–130)
TSH SERPL DL<=0.05 MIU/L-ACNC: 7.73 MIU/ML (ref 0.27–4.2)

## 2017-05-05 PROCEDURE — 97535 SELF CARE MNGMENT TRAINING: CPT

## 2017-05-05 PROCEDURE — 99305 1ST NF CARE MODERATE MDM 35: CPT | Performed by: NURSE PRACTITIONER

## 2017-05-05 PROCEDURE — 97110 THERAPEUTIC EXERCISES: CPT

## 2017-05-05 PROCEDURE — 97112 NEUROMUSCULAR REEDUCATION: CPT

## 2017-05-05 PROCEDURE — 82962 GLUCOSE BLOOD TEST: CPT

## 2017-05-05 PROCEDURE — 97162 PT EVAL MOD COMPLEX 30 MIN: CPT

## 2017-05-05 PROCEDURE — 97116 GAIT TRAINING THERAPY: CPT

## 2017-05-05 PROCEDURE — 63710000001 DIPHENHYDRAMINE PER 50 MG: Performed by: INTERNAL MEDICINE

## 2017-05-05 PROCEDURE — 97165 OT EVAL LOW COMPLEX 30 MIN: CPT

## 2017-05-05 RX ORDER — LEVOTHYROXINE SODIUM 0.1 MG/1
100 TABLET ORAL
Status: DISCONTINUED | OUTPATIENT
Start: 2017-05-06 | End: 2017-05-20 | Stop reason: HOSPADM

## 2017-05-05 RX ORDER — PANTOPRAZOLE SODIUM 40 MG/1
40 TABLET, DELAYED RELEASE ORAL
Status: DISCONTINUED | OUTPATIENT
Start: 2017-05-06 | End: 2017-05-20 | Stop reason: HOSPADM

## 2017-05-05 RX ORDER — ATORVASTATIN CALCIUM 20 MG/1
20 TABLET, FILM COATED ORAL NIGHTLY
Status: DISCONTINUED | OUTPATIENT
Start: 2017-05-05 | End: 2017-05-05

## 2017-05-05 RX ORDER — DIPHENHYDRAMINE HCL 25 MG
25 CAPSULE ORAL EVERY 6 HOURS PRN
Status: DISCONTINUED | OUTPATIENT
Start: 2017-05-05 | End: 2017-05-20 | Stop reason: HOSPADM

## 2017-05-05 RX ORDER — ATORVASTATIN CALCIUM 40 MG/1
40 TABLET, FILM COATED ORAL NIGHTLY
Status: DISCONTINUED | OUTPATIENT
Start: 2017-05-05 | End: 2017-05-20 | Stop reason: HOSPADM

## 2017-05-05 RX ORDER — CHOLECALCIFEROL (VITAMIN D3) 1250 MCG
50000 CAPSULE ORAL
Status: DISCONTINUED | OUTPATIENT
Start: 2017-05-05 | End: 2017-05-20 | Stop reason: HOSPADM

## 2017-05-05 RX ADMIN — DIPHENHYDRAMINE HYDROCHLORIDE 25 MG: 25 CAPSULE ORAL at 22:34

## 2017-05-05 RX ADMIN — CARVEDILOL 3.12 MG: 3.12 TABLET, FILM COATED ORAL at 22:34

## 2017-05-05 RX ADMIN — DIPHENHYDRAMINE HYDROCHLORIDE 25 MG: 25 CAPSULE ORAL at 08:02

## 2017-05-05 RX ADMIN — ATORVASTATIN CALCIUM 40 MG: 40 TABLET, FILM COATED ORAL at 22:34

## 2017-05-05 RX ADMIN — OFLOXACIN 50000 UNITS: 300 TABLET, COATED ORAL at 12:45

## 2017-05-05 RX ADMIN — CLOPIDOGREL 75 MG: 75 TABLET, FILM COATED ORAL at 08:02

## 2017-05-05 RX ADMIN — MICONAZOLE NITRATE: 2 POWDER TOPICAL at 13:00

## 2017-05-05 RX ADMIN — CARVEDILOL 3.12 MG: 3.12 TABLET, FILM COATED ORAL at 08:02

## 2017-05-05 RX ADMIN — MICONAZOLE NITRATE: 2 POWDER TOPICAL at 22:34

## 2017-05-05 RX ADMIN — LEVOTHYROXINE SODIUM 75 MCG: 75 TABLET ORAL at 06:41

## 2017-05-06 LAB
GLUCOSE BLDC GLUCOMTR-MCNC: 100 MG/DL (ref 70–130)
GLUCOSE BLDC GLUCOMTR-MCNC: 101 MG/DL (ref 70–130)
GLUCOSE BLDC GLUCOMTR-MCNC: 127 MG/DL (ref 70–130)
GLUCOSE BLDC GLUCOMTR-MCNC: 79 MG/DL (ref 70–130)

## 2017-05-06 PROCEDURE — 97110 THERAPEUTIC EXERCISES: CPT

## 2017-05-06 PROCEDURE — 82962 GLUCOSE BLOOD TEST: CPT

## 2017-05-06 PROCEDURE — 63710000001 DIPHENHYDRAMINE PER 50 MG: Performed by: INTERNAL MEDICINE

## 2017-05-06 PROCEDURE — 97116 GAIT TRAINING THERAPY: CPT

## 2017-05-06 RX ADMIN — PANTOPRAZOLE SODIUM 40 MG: 40 TABLET, DELAYED RELEASE ORAL at 06:22

## 2017-05-06 RX ADMIN — MICONAZOLE NITRATE: 2 POWDER TOPICAL at 08:58

## 2017-05-06 RX ADMIN — CLOPIDOGREL 75 MG: 75 TABLET, FILM COATED ORAL at 08:57

## 2017-05-06 RX ADMIN — DIPHENHYDRAMINE HYDROCHLORIDE 25 MG: 25 CAPSULE ORAL at 08:57

## 2017-05-06 RX ADMIN — CARVEDILOL 3.12 MG: 3.12 TABLET, FILM COATED ORAL at 21:00

## 2017-05-06 RX ADMIN — CARVEDILOL 3.12 MG: 3.12 TABLET, FILM COATED ORAL at 08:57

## 2017-05-06 RX ADMIN — MICONAZOLE NITRATE: 2 POWDER TOPICAL at 21:01

## 2017-05-06 RX ADMIN — ATORVASTATIN CALCIUM 40 MG: 40 TABLET, FILM COATED ORAL at 21:00

## 2017-05-06 RX ADMIN — LEVOTHYROXINE SODIUM 100 MCG: 100 TABLET ORAL at 06:22

## 2017-05-07 LAB
GLUCOSE BLDC GLUCOMTR-MCNC: 143 MG/DL (ref 70–130)
GLUCOSE BLDC GLUCOMTR-MCNC: 86 MG/DL (ref 70–130)
GLUCOSE BLDC GLUCOMTR-MCNC: 88 MG/DL (ref 70–130)
GLUCOSE BLDC GLUCOMTR-MCNC: 95 MG/DL (ref 70–130)

## 2017-05-07 PROCEDURE — 82962 GLUCOSE BLOOD TEST: CPT

## 2017-05-07 RX ADMIN — CARVEDILOL 3.12 MG: 3.12 TABLET, FILM COATED ORAL at 22:43

## 2017-05-07 RX ADMIN — LEVOTHYROXINE SODIUM 100 MCG: 100 TABLET ORAL at 05:49

## 2017-05-07 RX ADMIN — CARVEDILOL 3.12 MG: 3.12 TABLET, FILM COATED ORAL at 09:47

## 2017-05-07 RX ADMIN — PANTOPRAZOLE SODIUM 40 MG: 40 TABLET, DELAYED RELEASE ORAL at 05:49

## 2017-05-07 RX ADMIN — ATORVASTATIN CALCIUM 40 MG: 40 TABLET, FILM COATED ORAL at 22:42

## 2017-05-07 RX ADMIN — MICONAZOLE NITRATE: 2 POWDER TOPICAL at 22:43

## 2017-05-07 RX ADMIN — MICONAZOLE NITRATE: 2 POWDER TOPICAL at 09:47

## 2017-05-07 RX ADMIN — CLOPIDOGREL 75 MG: 75 TABLET, FILM COATED ORAL at 09:47

## 2017-05-08 ENCOUNTER — APPOINTMENT (OUTPATIENT)
Dept: CARDIOLOGY | Facility: HOSPITAL | Age: 82
End: 2017-05-08
Attending: NURSE PRACTITIONER

## 2017-05-08 LAB
GLUCOSE BLDC GLUCOMTR-MCNC: 105 MG/DL (ref 70–130)
GLUCOSE BLDC GLUCOMTR-MCNC: 124 MG/DL (ref 70–130)
GLUCOSE BLDC GLUCOMTR-MCNC: 90 MG/DL (ref 70–130)
GLUCOSE BLDC GLUCOMTR-MCNC: 94 MG/DL (ref 70–130)

## 2017-05-08 PROCEDURE — 82962 GLUCOSE BLOOD TEST: CPT

## 2017-05-08 PROCEDURE — 97112 NEUROMUSCULAR REEDUCATION: CPT

## 2017-05-08 PROCEDURE — 97535 SELF CARE MNGMENT TRAINING: CPT

## 2017-05-08 PROCEDURE — 97110 THERAPEUTIC EXERCISES: CPT

## 2017-05-08 PROCEDURE — 97116 GAIT TRAINING THERAPY: CPT

## 2017-05-08 RX ADMIN — CARVEDILOL 3.12 MG: 3.12 TABLET, FILM COATED ORAL at 21:38

## 2017-05-08 RX ADMIN — ATORVASTATIN CALCIUM 40 MG: 40 TABLET, FILM COATED ORAL at 21:39

## 2017-05-08 RX ADMIN — MICONAZOLE NITRATE: 2 POWDER TOPICAL at 21:39

## 2017-05-08 RX ADMIN — MICONAZOLE NITRATE: 2 POWDER TOPICAL at 08:50

## 2017-05-08 RX ADMIN — PANTOPRAZOLE SODIUM 40 MG: 40 TABLET, DELAYED RELEASE ORAL at 06:28

## 2017-05-08 RX ADMIN — LEVOTHYROXINE SODIUM 100 MCG: 100 TABLET ORAL at 06:28

## 2017-05-08 RX ADMIN — CARVEDILOL 3.12 MG: 3.12 TABLET, FILM COATED ORAL at 08:50

## 2017-05-08 RX ADMIN — CLOPIDOGREL 75 MG: 75 TABLET, FILM COATED ORAL at 08:50

## 2017-05-09 PROCEDURE — 97535 SELF CARE MNGMENT TRAINING: CPT

## 2017-05-09 PROCEDURE — 97116 GAIT TRAINING THERAPY: CPT

## 2017-05-09 PROCEDURE — 97110 THERAPEUTIC EXERCISES: CPT

## 2017-05-09 PROCEDURE — 97530 THERAPEUTIC ACTIVITIES: CPT

## 2017-05-09 RX ADMIN — CARVEDILOL 3.12 MG: 3.12 TABLET, FILM COATED ORAL at 08:49

## 2017-05-09 RX ADMIN — LEVOTHYROXINE SODIUM 100 MCG: 100 TABLET ORAL at 06:25

## 2017-05-09 RX ADMIN — ATORVASTATIN CALCIUM 40 MG: 40 TABLET, FILM COATED ORAL at 22:00

## 2017-05-09 RX ADMIN — PANTOPRAZOLE SODIUM 40 MG: 40 TABLET, DELAYED RELEASE ORAL at 06:25

## 2017-05-09 RX ADMIN — MICONAZOLE NITRATE: 2 POWDER TOPICAL at 08:50

## 2017-05-09 RX ADMIN — CARVEDILOL 3.12 MG: 3.12 TABLET, FILM COATED ORAL at 22:00

## 2017-05-09 RX ADMIN — MICONAZOLE NITRATE: 2 POWDER TOPICAL at 22:04

## 2017-05-09 RX ADMIN — CLOPIDOGREL 75 MG: 75 TABLET, FILM COATED ORAL at 08:49

## 2017-05-10 PROCEDURE — 97110 THERAPEUTIC EXERCISES: CPT

## 2017-05-10 PROCEDURE — 97116 GAIT TRAINING THERAPY: CPT

## 2017-05-10 PROCEDURE — 97535 SELF CARE MNGMENT TRAINING: CPT

## 2017-05-10 RX ADMIN — LEVOTHYROXINE SODIUM 100 MCG: 100 TABLET ORAL at 05:53

## 2017-05-10 RX ADMIN — CARVEDILOL 3.12 MG: 3.12 TABLET, FILM COATED ORAL at 08:17

## 2017-05-10 RX ADMIN — ATORVASTATIN CALCIUM 40 MG: 40 TABLET, FILM COATED ORAL at 22:15

## 2017-05-10 RX ADMIN — MICONAZOLE NITRATE: 2 POWDER TOPICAL at 22:16

## 2017-05-10 RX ADMIN — CLOPIDOGREL 75 MG: 75 TABLET, FILM COATED ORAL at 08:17

## 2017-05-10 RX ADMIN — CARVEDILOL 3.12 MG: 3.12 TABLET, FILM COATED ORAL at 22:15

## 2017-05-10 RX ADMIN — MICONAZOLE NITRATE: 2 POWDER TOPICAL at 08:17

## 2017-05-10 RX ADMIN — PANTOPRAZOLE SODIUM 40 MG: 40 TABLET, DELAYED RELEASE ORAL at 05:53

## 2017-05-11 PROCEDURE — 97110 THERAPEUTIC EXERCISES: CPT

## 2017-05-11 PROCEDURE — 97116 GAIT TRAINING THERAPY: CPT

## 2017-05-11 PROCEDURE — 97530 THERAPEUTIC ACTIVITIES: CPT

## 2017-05-11 PROCEDURE — 97535 SELF CARE MNGMENT TRAINING: CPT

## 2017-05-11 RX ADMIN — CARVEDILOL 3.12 MG: 3.12 TABLET, FILM COATED ORAL at 08:25

## 2017-05-11 RX ADMIN — MICONAZOLE NITRATE: 2 POWDER TOPICAL at 08:25

## 2017-05-11 RX ADMIN — CARVEDILOL 3.12 MG: 3.12 TABLET, FILM COATED ORAL at 22:15

## 2017-05-11 RX ADMIN — PANTOPRAZOLE SODIUM 40 MG: 40 TABLET, DELAYED RELEASE ORAL at 06:01

## 2017-05-11 RX ADMIN — LEVOTHYROXINE SODIUM 100 MCG: 100 TABLET ORAL at 06:04

## 2017-05-11 RX ADMIN — ATORVASTATIN CALCIUM 40 MG: 40 TABLET, FILM COATED ORAL at 22:15

## 2017-05-11 RX ADMIN — CLOPIDOGREL 75 MG: 75 TABLET, FILM COATED ORAL at 08:25

## 2017-05-11 RX ADMIN — MICONAZOLE NITRATE: 2 POWDER TOPICAL at 22:16

## 2017-05-12 ENCOUNTER — APPOINTMENT (OUTPATIENT)
Dept: CARDIOLOGY | Facility: HOSPITAL | Age: 82
End: 2017-05-12
Attending: INTERNAL MEDICINE

## 2017-05-12 LAB
ALBUMIN SERPL-MCNC: 3.5 G/DL (ref 3.5–5.2)
ALBUMIN/GLOB SERPL: 1.1 G/DL
ALP SERPL-CCNC: 75 U/L (ref 40–129)
ALT SERPL W P-5'-P-CCNC: 15 U/L (ref 5–33)
ANION GAP SERPL CALCULATED.3IONS-SCNC: 11.7 MMOL/L
AST SERPL-CCNC: 23 U/L (ref 5–32)
BASOPHILS # BLD AUTO: 0.13 10*3/MM3 (ref 0–0.2)
BASOPHILS NFR BLD AUTO: 1.4 % (ref 0–2)
BILIRUB SERPL-MCNC: 0.6 MG/DL (ref 0.2–1.2)
BUN BLD-MCNC: 16 MG/DL (ref 8–23)
BUN/CREAT SERPL: 20.5 (ref 7–25)
CALCIUM SPEC-SCNC: 8.9 MG/DL (ref 8.8–10.5)
CHLORIDE SERPL-SCNC: 101 MMOL/L (ref 98–107)
CO2 SERPL-SCNC: 25.3 MMOL/L (ref 22–29)
CREAT BLD-MCNC: 0.78 MG/DL (ref 0.57–1)
DEPRECATED RDW RBC AUTO: 42.3 FL (ref 37–54)
EOSINOPHIL # BLD AUTO: 0.63 10*3/MM3 (ref 0.1–0.3)
EOSINOPHIL NFR BLD AUTO: 6.9 % (ref 0–4)
ERYTHROCYTE [DISTWIDTH] IN BLOOD BY AUTOMATED COUNT: 12.9 % (ref 11.5–14.5)
GFR SERPL CREATININE-BSD FRML MDRD: 70 ML/MIN/1.73
GLOBULIN UR ELPH-MCNC: 3.1 GM/DL
GLUCOSE BLD-MCNC: 100 MG/DL (ref 65–99)
HCT VFR BLD AUTO: 36.9 % (ref 37–47)
HGB BLD-MCNC: 12.2 G/DL (ref 12–16)
IMM GRANULOCYTES # BLD: 0.03 10*3/MM3 (ref 0–0.03)
IMM GRANULOCYTES NFR BLD: 0.3 % (ref 0–0.5)
LYMPHOCYTES # BLD AUTO: 3.21 10*3/MM3 (ref 0.6–4.8)
LYMPHOCYTES NFR BLD AUTO: 35.2 % (ref 20–45)
MCH RBC QN AUTO: 29.7 PG (ref 27–31)
MCHC RBC AUTO-ENTMCNC: 33.1 G/DL (ref 31–37)
MCV RBC AUTO: 89.8 FL (ref 81–99)
MONOCYTES # BLD AUTO: 0.62 10*3/MM3 (ref 0–1)
MONOCYTES NFR BLD AUTO: 6.8 % (ref 3–8)
NEUTROPHILS # BLD AUTO: 4.49 10*3/MM3 (ref 1.5–8.3)
NEUTROPHILS NFR BLD AUTO: 49.4 % (ref 45–70)
NRBC BLD MANUAL-RTO: 0 /100 WBC (ref 0–0)
PLATELET # BLD AUTO: 328 10*3/MM3 (ref 140–500)
PMV BLD AUTO: 9.6 FL (ref 7.4–10.4)
POTASSIUM BLD-SCNC: 4.8 MMOL/L (ref 3.5–5.2)
PROT SERPL-MCNC: 6.6 G/DL (ref 6–8.5)
RBC # BLD AUTO: 4.11 10*6/MM3 (ref 4.2–5.4)
SODIUM BLD-SCNC: 138 MMOL/L (ref 136–145)
WBC NRBC COR # BLD: 9.11 10*3/MM3 (ref 4.8–10.8)

## 2017-05-12 PROCEDURE — 97116 GAIT TRAINING THERAPY: CPT

## 2017-05-12 PROCEDURE — 97530 THERAPEUTIC ACTIVITIES: CPT

## 2017-05-12 PROCEDURE — 80053 COMPREHEN METABOLIC PANEL: CPT | Performed by: NURSE PRACTITIONER

## 2017-05-12 PROCEDURE — 85025 COMPLETE CBC W/AUTO DIFF WBC: CPT | Performed by: NURSE PRACTITIONER

## 2017-05-12 PROCEDURE — 0296T HC EXT ECG > 48HR TO 21 DAY RCRD W/CONECT INTL RCRD: CPT

## 2017-05-12 PROCEDURE — 97110 THERAPEUTIC EXERCISES: CPT

## 2017-05-12 PROCEDURE — 97535 SELF CARE MNGMENT TRAINING: CPT

## 2017-05-12 RX ADMIN — ATORVASTATIN CALCIUM 40 MG: 40 TABLET, FILM COATED ORAL at 21:12

## 2017-05-12 RX ADMIN — CARVEDILOL 3.12 MG: 3.12 TABLET, FILM COATED ORAL at 21:12

## 2017-05-12 RX ADMIN — CARVEDILOL 3.12 MG: 3.12 TABLET, FILM COATED ORAL at 08:29

## 2017-05-12 RX ADMIN — CLOPIDOGREL 75 MG: 75 TABLET, FILM COATED ORAL at 08:29

## 2017-05-12 RX ADMIN — PANTOPRAZOLE SODIUM 40 MG: 40 TABLET, DELAYED RELEASE ORAL at 06:21

## 2017-05-12 RX ADMIN — OFLOXACIN 50000 UNITS: 300 TABLET, COATED ORAL at 12:45

## 2017-05-12 RX ADMIN — LEVOTHYROXINE SODIUM 100 MCG: 100 TABLET ORAL at 06:20

## 2017-05-12 RX ADMIN — MICONAZOLE NITRATE: 2 POWDER TOPICAL at 08:29

## 2017-05-12 RX ADMIN — MICONAZOLE NITRATE: 2 POWDER TOPICAL at 21:06

## 2017-05-13 PROCEDURE — 97535 SELF CARE MNGMENT TRAINING: CPT

## 2017-05-13 PROCEDURE — 97116 GAIT TRAINING THERAPY: CPT

## 2017-05-13 PROCEDURE — 97110 THERAPEUTIC EXERCISES: CPT

## 2017-05-13 RX ADMIN — LEVOTHYROXINE SODIUM 100 MCG: 100 TABLET ORAL at 06:15

## 2017-05-13 RX ADMIN — PANTOPRAZOLE SODIUM 40 MG: 40 TABLET, DELAYED RELEASE ORAL at 06:15

## 2017-05-13 RX ADMIN — MICONAZOLE NITRATE: 2 POWDER TOPICAL at 20:41

## 2017-05-13 RX ADMIN — CARVEDILOL 3.12 MG: 3.12 TABLET, FILM COATED ORAL at 20:41

## 2017-05-13 RX ADMIN — ATORVASTATIN CALCIUM 40 MG: 40 TABLET, FILM COATED ORAL at 20:42

## 2017-05-13 RX ADMIN — CLOPIDOGREL 75 MG: 75 TABLET, FILM COATED ORAL at 07:50

## 2017-05-13 RX ADMIN — CARVEDILOL 3.12 MG: 3.12 TABLET, FILM COATED ORAL at 07:50

## 2017-05-14 RX ADMIN — CARVEDILOL 3.12 MG: 3.12 TABLET, FILM COATED ORAL at 09:20

## 2017-05-14 RX ADMIN — CLOPIDOGREL 75 MG: 75 TABLET, FILM COATED ORAL at 09:20

## 2017-05-14 RX ADMIN — ATORVASTATIN CALCIUM 40 MG: 40 TABLET, FILM COATED ORAL at 21:23

## 2017-05-14 RX ADMIN — MICONAZOLE NITRATE: 2 POWDER TOPICAL at 21:23

## 2017-05-14 RX ADMIN — LEVOTHYROXINE SODIUM 100 MCG: 100 TABLET ORAL at 06:33

## 2017-05-14 RX ADMIN — CARVEDILOL 3.12 MG: 3.12 TABLET, FILM COATED ORAL at 21:23

## 2017-05-14 RX ADMIN — MICONAZOLE NITRATE: 2 POWDER TOPICAL at 09:21

## 2017-05-14 RX ADMIN — PANTOPRAZOLE SODIUM 40 MG: 40 TABLET, DELAYED RELEASE ORAL at 06:33

## 2017-05-15 PROCEDURE — 97116 GAIT TRAINING THERAPY: CPT

## 2017-05-15 PROCEDURE — 97530 THERAPEUTIC ACTIVITIES: CPT

## 2017-05-15 PROCEDURE — 97112 NEUROMUSCULAR REEDUCATION: CPT

## 2017-05-15 PROCEDURE — 97110 THERAPEUTIC EXERCISES: CPT | Performed by: PHYSICAL THERAPIST

## 2017-05-15 RX ADMIN — ATORVASTATIN CALCIUM 40 MG: 40 TABLET, FILM COATED ORAL at 21:17

## 2017-05-15 RX ADMIN — LEVOTHYROXINE SODIUM 100 MCG: 100 TABLET ORAL at 05:45

## 2017-05-15 RX ADMIN — CARVEDILOL 3.12 MG: 3.12 TABLET, FILM COATED ORAL at 08:08

## 2017-05-15 RX ADMIN — PANTOPRAZOLE SODIUM 40 MG: 40 TABLET, DELAYED RELEASE ORAL at 05:45

## 2017-05-15 RX ADMIN — MICONAZOLE NITRATE: 2 POWDER TOPICAL at 08:08

## 2017-05-15 RX ADMIN — CLOPIDOGREL 75 MG: 75 TABLET, FILM COATED ORAL at 08:08

## 2017-05-15 RX ADMIN — MICONAZOLE NITRATE: 2 POWDER TOPICAL at 22:25

## 2017-05-15 RX ADMIN — CARVEDILOL 3.12 MG: 3.12 TABLET, FILM COATED ORAL at 21:17

## 2017-05-16 PROCEDURE — 97535 SELF CARE MNGMENT TRAINING: CPT

## 2017-05-16 PROCEDURE — 97110 THERAPEUTIC EXERCISES: CPT

## 2017-05-16 PROCEDURE — 97530 THERAPEUTIC ACTIVITIES: CPT

## 2017-05-16 PROCEDURE — 97116 GAIT TRAINING THERAPY: CPT

## 2017-05-16 PROCEDURE — 97112 NEUROMUSCULAR REEDUCATION: CPT

## 2017-05-16 RX ADMIN — LEVOTHYROXINE SODIUM 100 MCG: 100 TABLET ORAL at 05:30

## 2017-05-16 RX ADMIN — MICONAZOLE NITRATE: 2 POWDER TOPICAL at 20:50

## 2017-05-16 RX ADMIN — ATORVASTATIN CALCIUM 40 MG: 40 TABLET, FILM COATED ORAL at 20:51

## 2017-05-16 RX ADMIN — MICONAZOLE NITRATE: 2 POWDER TOPICAL at 10:34

## 2017-05-16 RX ADMIN — CARVEDILOL 3.12 MG: 3.12 TABLET, FILM COATED ORAL at 20:51

## 2017-05-16 RX ADMIN — PANTOPRAZOLE SODIUM 40 MG: 40 TABLET, DELAYED RELEASE ORAL at 05:30

## 2017-05-16 RX ADMIN — CLOPIDOGREL 75 MG: 75 TABLET, FILM COATED ORAL at 10:33

## 2017-05-16 RX ADMIN — CARVEDILOL 3.12 MG: 3.12 TABLET, FILM COATED ORAL at 10:34

## 2017-05-17 PROCEDURE — 97110 THERAPEUTIC EXERCISES: CPT

## 2017-05-17 PROCEDURE — 97116 GAIT TRAINING THERAPY: CPT

## 2017-05-17 PROCEDURE — 97530 THERAPEUTIC ACTIVITIES: CPT

## 2017-05-17 PROCEDURE — 97116 GAIT TRAINING THERAPY: CPT | Performed by: PHYSICAL THERAPIST

## 2017-05-17 PROCEDURE — 97535 SELF CARE MNGMENT TRAINING: CPT

## 2017-05-17 PROCEDURE — 97110 THERAPEUTIC EXERCISES: CPT | Performed by: PHYSICAL THERAPIST

## 2017-05-17 RX ADMIN — CARVEDILOL 3.12 MG: 3.12 TABLET, FILM COATED ORAL at 08:01

## 2017-05-17 RX ADMIN — ATORVASTATIN CALCIUM 40 MG: 40 TABLET, FILM COATED ORAL at 20:04

## 2017-05-17 RX ADMIN — MICONAZOLE NITRATE: 2 POWDER TOPICAL at 20:04

## 2017-05-17 RX ADMIN — CLOPIDOGREL 75 MG: 75 TABLET, FILM COATED ORAL at 08:01

## 2017-05-17 RX ADMIN — CARVEDILOL 3.12 MG: 3.12 TABLET, FILM COATED ORAL at 20:04

## 2017-05-18 PROCEDURE — 97530 THERAPEUTIC ACTIVITIES: CPT

## 2017-05-18 PROCEDURE — 97110 THERAPEUTIC EXERCISES: CPT

## 2017-05-18 PROCEDURE — 97116 GAIT TRAINING THERAPY: CPT

## 2017-05-18 PROCEDURE — 97535 SELF CARE MNGMENT TRAINING: CPT

## 2017-05-18 RX ADMIN — LEVOTHYROXINE SODIUM 100 MCG: 100 TABLET ORAL at 07:11

## 2017-05-18 RX ADMIN — ATORVASTATIN CALCIUM 40 MG: 40 TABLET, FILM COATED ORAL at 21:00

## 2017-05-18 RX ADMIN — MICONAZOLE NITRATE: 2 POWDER TOPICAL at 20:59

## 2017-05-18 RX ADMIN — MICONAZOLE NITRATE: 2 POWDER TOPICAL at 08:28

## 2017-05-18 RX ADMIN — CLOPIDOGREL 75 MG: 75 TABLET, FILM COATED ORAL at 08:28

## 2017-05-18 RX ADMIN — CARVEDILOL 3.12 MG: 3.12 TABLET, FILM COATED ORAL at 21:00

## 2017-05-18 RX ADMIN — PANTOPRAZOLE SODIUM 40 MG: 40 TABLET, DELAYED RELEASE ORAL at 07:12

## 2017-05-18 RX ADMIN — CARVEDILOL 3.12 MG: 3.12 TABLET, FILM COATED ORAL at 08:28

## 2017-05-19 PROCEDURE — 97110 THERAPEUTIC EXERCISES: CPT

## 2017-05-19 PROCEDURE — 97116 GAIT TRAINING THERAPY: CPT

## 2017-05-19 RX ADMIN — MICONAZOLE NITRATE: 2 POWDER TOPICAL at 21:55

## 2017-05-19 RX ADMIN — LEVOTHYROXINE SODIUM 100 MCG: 100 TABLET ORAL at 06:29

## 2017-05-19 RX ADMIN — CARVEDILOL 3.12 MG: 3.12 TABLET, FILM COATED ORAL at 08:05

## 2017-05-19 RX ADMIN — CLOPIDOGREL 75 MG: 75 TABLET, FILM COATED ORAL at 08:05

## 2017-05-19 RX ADMIN — MICONAZOLE NITRATE: 2 POWDER TOPICAL at 08:07

## 2017-05-19 RX ADMIN — PANTOPRAZOLE SODIUM 40 MG: 40 TABLET, DELAYED RELEASE ORAL at 06:28

## 2017-05-19 RX ADMIN — CARVEDILOL 3.12 MG: 3.12 TABLET, FILM COATED ORAL at 21:49

## 2017-05-19 RX ADMIN — OFLOXACIN 50000 UNITS: 300 TABLET, COATED ORAL at 12:49

## 2017-05-19 RX ADMIN — ATORVASTATIN CALCIUM 40 MG: 40 TABLET, FILM COATED ORAL at 21:49

## 2017-05-20 VITALS
TEMPERATURE: 97.4 F | HEIGHT: 60 IN | SYSTOLIC BLOOD PRESSURE: 121 MMHG | OXYGEN SATURATION: 96 % | BODY MASS INDEX: 29.8 KG/M2 | RESPIRATION RATE: 18 BRPM | HEART RATE: 66 BPM | DIASTOLIC BLOOD PRESSURE: 58 MMHG | WEIGHT: 151.8 LBS

## 2017-05-20 PROCEDURE — 99315 NF DSCHRG MGMT 30 MIN/LESS: CPT | Performed by: FAMILY MEDICINE

## 2017-05-20 RX ORDER — CLOPIDOGREL BISULFATE 75 MG/1
75 TABLET ORAL DAILY
Qty: 30 TABLET | Refills: 1 | Status: SHIPPED | OUTPATIENT
Start: 2017-05-20 | End: 2017-11-27

## 2017-05-20 RX ORDER — LEVOTHYROXINE SODIUM 0.1 MG/1
100 TABLET ORAL DAILY
Qty: 30 TABLET | Refills: 0 | Status: SHIPPED | OUTPATIENT
Start: 2017-05-20 | End: 2018-05-26

## 2017-05-20 RX ADMIN — PANTOPRAZOLE SODIUM 40 MG: 40 TABLET, DELAYED RELEASE ORAL at 06:08

## 2017-05-20 RX ADMIN — CLOPIDOGREL 75 MG: 75 TABLET, FILM COATED ORAL at 08:12

## 2017-05-20 RX ADMIN — LEVOTHYROXINE SODIUM 100 MCG: 100 TABLET ORAL at 06:08

## 2017-05-20 RX ADMIN — CARVEDILOL 3.12 MG: 3.12 TABLET, FILM COATED ORAL at 08:13

## 2017-05-20 RX ADMIN — MICONAZOLE NITRATE: 2 POWDER TOPICAL at 08:13

## 2017-06-01 PROCEDURE — 0298T ZIO PATCH: CPT | Performed by: INTERNAL MEDICINE

## 2017-06-19 RX ORDER — LEVOTHYROXINE SODIUM 0.1 MG/1
TABLET ORAL
Qty: 30 TABLET | Refills: 0 | OUTPATIENT
Start: 2017-06-19

## 2017-06-19 NOTE — TELEPHONE ENCOUNTER
Med came to me , as I discharged her from [facility]  Call patient or pharm and sent to Dr Lara

## 2017-07-03 ENCOUNTER — TELEPHONE (OUTPATIENT)
Dept: INTERNAL MEDICINE | Facility: CLINIC | Age: 82
End: 2017-07-03

## 2017-07-03 NOTE — TELEPHONE ENCOUNTER
Patient advised to go immediately to ER as per below.  Patient voiced understanding and agreed.    ----- Message from Berkley Duke MA sent at 7/3/2017 10:45 AM EDT -----  Regarding: FW: QUESTION FOR ADVICE      ----- Message -----     From: FOZIA Rojas     Sent: 7/3/2017  10:29 AM       To: Anastasi[facility] Carrillo [facility] [place]  Subject: FW: QUESTION FOR ADVICE                          As she has not bee since here to establish care, we advise going to the ER.   ----- Message -----     From: Berkley Duke MA     Sent: 7/3/2017  10:25 AM       To: FOZIA Rojas  Subject: FW: QUESTION FOR ADVICE                          Go to ER?  ----- Message -----     From: Berkley Duke MA     Sent: 7/3/2017  10:21 AM       To: Berkley Duke MA  Subject: FW: QUESTION FOR ADVICE                              ----- Message -----     From: Karli Arceo     Sent: 7/3/2017  10:05 AM       To: Anastasi[facility]illo [facility] [place]  Subject: QUESTION FOR ADVICE                              [facility] - NEW PATIENT    PATIENT CAME TODAY THINKING HER NEW PATIENT APPOINTMENT WAS TODAY.  SHE SAYS SHE HAD A LITE STROKE AND GUESSES THAT IS WHY SHE CAME THE WRONG DAY.    SHE THINKS SHE MAY HAVE A UTI AND IS UNABLE TO DRIVE TO DR FRIEDMAN'S OFFICE ANY.  A NEIGHBOR/FRIEND BROUGHT HER IN TODAY.    PT CANNOT GO TO [facility] FOR UTI DUE TO AGE.    IF THERE IS ANY SUGGESTIONS FOR PT OR IF SHE CAN BE WORKED IN EARLIER CAN WE CALL HER [phone]

## 2017-07-17 RX ORDER — CLOPIDOGREL BISULFATE 75 MG/1
TABLET ORAL
Qty: 30 TABLET | Refills: 0 | OUTPATIENT
Start: 2017-07-17

## 2017-11-27 ENCOUNTER — APPOINTMENT (OUTPATIENT)
Dept: LAB | Facility: HOSPITAL | Age: 82
End: 2017-11-27
Attending: INTERNAL MEDICINE

## 2017-11-27 ENCOUNTER — OFFICE VISIT (OUTPATIENT)
Dept: CARDIOLOGY | Facility: CLINIC | Age: 82
End: 2017-11-27

## 2017-11-27 VITALS
WEIGHT: 142 LBS | HEART RATE: 69 BPM | DIASTOLIC BLOOD PRESSURE: 90 MMHG | SYSTOLIC BLOOD PRESSURE: 130 MMHG | BODY MASS INDEX: 26.81 KG/M2 | HEIGHT: 61 IN

## 2017-11-27 DIAGNOSIS — I63.411 CEREBROVASCULAR ACCIDENT (CVA) DUE TO EMBOLISM OF RIGHT MIDDLE CEREBRAL ARTERY (HCC): ICD-10-CM

## 2017-11-27 DIAGNOSIS — I10 ESSENTIAL HYPERTENSION: Primary | ICD-10-CM

## 2017-11-27 DIAGNOSIS — I48.0 PAF (PAROXYSMAL ATRIAL FIBRILLATION) (HCC): ICD-10-CM

## 2017-11-27 DIAGNOSIS — E78.2 MIXED HYPERLIPIDEMIA: ICD-10-CM

## 2017-11-27 LAB
ALBUMIN SERPL-MCNC: 3.9 G/DL (ref 3.5–5.2)
ALBUMIN/GLOB SERPL: 1.2 G/DL
ALP SERPL-CCNC: 73 U/L (ref 40–129)
ALT SERPL W P-5'-P-CCNC: 11 U/L (ref 5–33)
ANION GAP SERPL CALCULATED.3IONS-SCNC: 9.9 MMOL/L
AST SERPL-CCNC: 16 U/L (ref 5–32)
BILIRUB SERPL-MCNC: 0.2 MG/DL (ref 0.2–1.2)
BUN BLD-MCNC: 13 MG/DL (ref 8–23)
BUN/CREAT SERPL: 14.3 (ref 7–25)
CALCIUM SPEC-SCNC: 9.4 MG/DL (ref 8.8–10.5)
CHLORIDE SERPL-SCNC: 102 MMOL/L (ref 98–107)
CO2 SERPL-SCNC: 29.1 MMOL/L (ref 22–29)
CREAT BLD-MCNC: 0.91 MG/DL (ref 0.57–1)
GFR SERPL CREATININE-BSD FRML MDRD: 58 ML/MIN/1.73
GLOBULIN UR ELPH-MCNC: 3.2 GM/DL
GLUCOSE BLD-MCNC: 96 MG/DL (ref 65–99)
POTASSIUM BLD-SCNC: 4.4 MMOL/L (ref 3.5–5.2)
PROT SERPL-MCNC: 7.1 G/DL (ref 6–8.5)
SODIUM BLD-SCNC: 141 MMOL/L (ref 136–145)

## 2017-11-27 PROCEDURE — 99214 OFFICE O/P EST MOD 30 MIN: CPT | Performed by: INTERNAL MEDICINE

## 2017-11-27 PROCEDURE — 36415 COLL VENOUS BLD VENIPUNCTURE: CPT | Performed by: INTERNAL MEDICINE

## 2017-11-27 PROCEDURE — 84443 ASSAY THYROID STIM HORMONE: CPT | Performed by: INTERNAL MEDICINE

## 2017-11-27 PROCEDURE — 93000 ELECTROCARDIOGRAM COMPLETE: CPT | Performed by: INTERNAL MEDICINE

## 2017-11-27 PROCEDURE — 84479 ASSAY OF THYROID (T3 OR T4): CPT | Performed by: INTERNAL MEDICINE

## 2017-11-27 PROCEDURE — 80053 COMPREHEN METABOLIC PANEL: CPT | Performed by: INTERNAL MEDICINE

## 2017-11-27 PROCEDURE — 84436 ASSAY OF TOTAL THYROXINE: CPT | Performed by: INTERNAL MEDICINE

## 2017-11-27 RX ORDER — FAMOTIDINE 40 MG/1
40 TABLET, FILM COATED ORAL DAILY
COMMUNITY

## 2017-11-27 NOTE — PROGRESS NOTES
Date of Office Visit: 2017  Encounter Provider: Cuca Grier MD  Place of Service: [facility] CARDIOLOGY  Patient Name: Doris Coreas  :1928      Patient ID:  Doris Coreas is a 89 y.o. female is here for  followup for HTN and CVA.         History of Present Illness  She has had difficulty managing her blood pressure. She was on Losartan but apparently developed a rash with the Losartan and had to stop this. It was controlling her blood pressure. She was placed on amlodipine at 10 mg daily but then had a lot of ankle edema.      She has hypertension and hyperlipidemia.       After her initial visit, she was started on hydrochlorothiazide at 12.5 mg twice a day. Her blood pressure is now under good control. She was also sent for a carotid duplex study which was performed on 2012 which was negative for carotid artery stenosis.       In 10/2010 she did have a stress nuclear perfusion study which was negative for any evidence of ischemia. This was performed at [facility].       She had a carotid duplex study performed on 2014 at Dr. Lara's office which  revealed less than 40% bilateral internal carotid artery stenosis. There was an  echocardiogram performed in 2013 which revealed an ejection fraction of 60-65% with  grade I diastolic dysfunction and borderline left ventricular hypertrophy. Her valvular  structure and function were normal. This was done at Dr. Lara's office as well.         She had an echocardiogram done on 2015. There was a normal  ejection fraction of 60% to 65%, elevated left atrial pressure, and trace mitral  insufficiency. She also had a carotid duplex study done on 07/15/2015 which showed 40% to  59% stenosis of the right internal carotid artery and less than 40% stenosis of the left  internal carotid artery.       She was admitted May 2017 with visual changes and left hand weakness.  She was found to have  stroke and the MRI of her brain showed multiple strokes in the right middle cerebral artery territory.  The MRA showed no evidence of carotid stenosis.  She's no longer able to drive because of visual changes.  She was placed on Plavix at that time and no ADDI was done.  She did have lower extremity venous duplex studies done May 2017 showing no DVT.  She had an echocardiogram done May 2017 which showed normal ejection fraction 61%, normal saline contrast study and no significant valve disease.  She then wore Ziopatch which showed multiple bursts of paroxysmal atrial fibrillation.  This is likely the source of her stroke.  Her TSH was elevated at 7.7 during that admission.  Her Synthroid dosing was changed then.    She is stable.  Her blood pressures controlled.  She doesn't feel her heart racing or skipping.  She's had no dizziness or syncope.  She's only on Plavix at this time is not on any anticoagulant.  She's had no falls.  She has chronic lower extremity edema which is unchanged.  She's had no chest pain or pressure and no difficulty breathing.      Past Medical History:   Diagnosis Date   • Carotid artery stenosis    • Chronic renal insufficiency    • Compression fracture of lumbar vertebra    • Difficulty breathing    • Fatigue    • H/O echocardiogram 05/20/2015   • Health care maintenance    • History of EKG 09/25/2015   • Hyperlipidemia    • Hypertension    • Hypothyroidism    • Stroke          Past Surgical History:   Procedure Laterality Date   • CATARACT EXTRACTION     • CHOLECYSTECTOMY     • HERNIA REPAIR         Current Outpatient Prescriptions on File Prior to Visit   Medication Sig Dispense Refill   • B Complex Vitamins (VITAMIN B COMPLEX) tablet Take 1 tablet by mouth Daily.     • carvedilol (COREG) 3.125 MG tablet Take 1 tablet by mouth 2 (Two) Times a Day.     • Cholecalciferol (VITAMIN D PO) Take 1 tablet by mouth Daily.     • clopidogrel (PLAVIX) 75 MG tablet Take 1 tablet by mouth Daily.  Indications: Stroke caused by a Blood Clot 30 tablet 1   • esomeprazole (nexIUM) 40 MG capsule Take 20 mg by mouth Every Night.     • ezetimibe-simvastatin (VYTORIN) 10-40 MG per tablet Take 1 tablet by mouth Daily.     • furosemide (LASIX) 40 MG tablet Take  by mouth. Take as needed     • levothyroxine (SYNTHROID, LEVOTHROID) 100 MCG tablet Take 1 tablet by mouth Daily. 30 tablet 0   • potassium chloride (K-DUR) 10 MEQ CR tablet Take 10 mEq by mouth Daily As Needed (when taking lasix).     • [DISCONTINUED] meloxicam (MOBIC) 7.5 MG tablet      • [DISCONTINUED] sulfamethoxazole-trimethoprim (BACTRIM DS,SEPTRA DS) 800-160 MG per tablet Take 1 tablet by mouth 2 (Two) Times a Day. 14 tablet 0     No current facility-administered medications on file prior to visit.        Social History     Social History   • Marital status:      Spouse name: N/A   • Number of children: N/A   • Years of education: N/A     Occupational History   • Not on file.     Social History Main Topics   • Smoking status: Never Smoker   • Smokeless tobacco: Never Used      Comment: caffeine use   • Alcohol use No   • Drug use: Not on file   • Sexual activity: Not on file     Other Topics Concern   • Not on file     Social History Narrative           Review of Systems   Constitution: Negative.   HENT: Negative for congestion.    Eyes: Negative for vision loss in left eye and vision loss in right eye.   Respiratory: Negative.  Negative for cough, hemoptysis, shortness of breath, sleep disturbances due to breathing, snoring, sputum production and wheezing.    Endocrine: Negative.    Hematologic/Lymphatic: Negative.    Skin: Negative for poor wound healing and rash.   Musculoskeletal: Negative for falls, gout, muscle cramps and myalgias.   Gastrointestinal: Negative for abdominal pain, diarrhea, dysphagia, hematemesis, melena, nausea and vomiting.   Neurological: Negative for excessive daytime sleepiness, dizziness, headaches, light-headedness,  "loss of balance, seizures and vertigo.   Psychiatric/Behavioral: Negative for depression and substance abuse. The patient is not nervous/anxious.        Procedures    ECG 12 Lead  Date/Time: 11/27/2017 11:38 AM  Performed by: ANITA KHAN  Authorized by: ANITA KHAN   Comparison: compared with previous ECG   Similar to previous ECG  Rhythm: sinus rhythm  Ectopy: atrial premature contractions  Clinical impression: non-specific ECG               Objective:      Vitals:    11/27/17 1121   BP: 130/90   BP Location: Right arm   Patient Position: Sitting   Pulse: 69   Weight: 142 lb (64.4 kg)   Height: 61\" (154.9 cm)     Body mass index is 26.83 kg/(m^2).    Physical Exam   Constitutional: She is oriented to person, place, and time. She appears well-developed and well-nourished. No distress.   HENT:   Head: Normocephalic and atraumatic.   Eyes: Conjunctivae are normal. No scleral icterus.   Neck: Neck supple. No JVD present. Carotid bruit is not present. No thyromegaly present.   Cardiovascular: Normal rate, regular rhythm, S1 normal, S2 normal, normal heart sounds and intact distal pulses.   No extrasystoles are present. PMI is not displaced.  Exam reveals no gallop.    No murmur heard.  Pulses:       Carotid pulses are 2+ on the right side, and 2+ on the left side.       Radial pulses are 2+ on the right side, and 2+ on the left side.        Dorsalis pedis pulses are 2+ on the right side, and 2+ on the left side.        Posterior tibial pulses are 2+ on the right side, and 2+ on the left side.   Pulmonary/Chest: Effort normal and breath sounds normal. No respiratory distress. She has no wheezes. She has no rhonchi. She has no rales. She exhibits no tenderness.   Abdominal: Soft. Bowel sounds are normal. She exhibits no distension, no abdominal bruit and no mass. There is no tenderness.   Musculoskeletal: She exhibits no edema or deformity.   Lymphadenopathy:     She has no cervical adenopathy. "   Neurological: She is alert and oriented to person, place, and time. No cranial nerve deficit.   Skin: Skin is warm and dry. No rash noted. She is not diaphoretic. No cyanosis. No pallor. Nails show no clubbing.   Psychiatric: She has a normal mood and affect. Judgment normal.   Vitals reviewed.      Lab Review:       Assessment:      Diagnosis Plan   1. Essential hypertension     2. Mixed hyperlipidemia     3. Cerebrovascular accident (CVA) due to embolism of right middle cerebral artery       1. Hypertension; well controlled.   2. No PERLA, tested 2014.  3. Mild bilateral carotid stenosis.  4. Hyperlipidemia. We will treat with Vytorin.   5. Renal insufficiency; followed by Dr. Lara.  6. Back pain, stable.   7. CVA due to PAF 5/2017.  Stop plavix and start xarelto 20mg daily with food.      Plan:       See back in 3 months.    Atrial Fibrillation and Atrial Flutter  Assessment  • The patient has paroxysmal atrial fibrillation  • This is non-valvular in etiology  • The patient's CHADS2-VASc score is 6  • A [id number] score of 2 or more is considered a high risk for a thromboembolic event  • Rivaroxaban prescribed    Plan  • Continue in atrial fibrillation with rate control  • Continue rivaroxaban for antithrombotic therapy, bleeding issues discussed

## 2017-11-28 ENCOUNTER — TELEPHONE (OUTPATIENT)
Dept: CARDIOLOGY | Facility: CLINIC | Age: 82
End: 2017-11-28

## 2017-11-28 LAB
T-UPTAKE NFR SERPL: 1.05 TBI (ref 0.8–1.3)
T4 SERPL-MCNC: 8.93 MCG/DL (ref 4.5–11.7)
TSH SERPL DL<=0.05 MIU/L-ACNC: 0.8 MIU/ML (ref 0.27–4.2)

## 2017-11-28 NOTE — TELEPHONE ENCOUNTER
----- Message from Cuca Grier MD sent at 11/28/2017  1:00 PM EST -----  pls call and let her know that her thyroid is well controlled.

## 2018-03-02 ENCOUNTER — OFFICE VISIT (OUTPATIENT)
Dept: CARDIOLOGY | Facility: CLINIC | Age: 83
End: 2018-03-02

## 2018-03-02 VITALS
BODY MASS INDEX: 28.81 KG/M2 | HEIGHT: 61 IN | DIASTOLIC BLOOD PRESSURE: 80 MMHG | WEIGHT: 152.6 LBS | SYSTOLIC BLOOD PRESSURE: 126 MMHG | HEART RATE: 63 BPM

## 2018-03-02 DIAGNOSIS — I63.411 CEREBROVASCULAR ACCIDENT (CVA) DUE TO EMBOLISM OF RIGHT MIDDLE CEREBRAL ARTERY (HCC): ICD-10-CM

## 2018-03-02 DIAGNOSIS — I10 ESSENTIAL HYPERTENSION: Primary | ICD-10-CM

## 2018-03-02 DIAGNOSIS — E78.2 MIXED HYPERLIPIDEMIA: ICD-10-CM

## 2018-03-02 PROCEDURE — 99214 OFFICE O/P EST MOD 30 MIN: CPT | Performed by: INTERNAL MEDICINE

## 2018-03-02 PROCEDURE — 93000 ELECTROCARDIOGRAM COMPLETE: CPT | Performed by: INTERNAL MEDICINE

## 2018-03-02 NOTE — PROGRESS NOTES
Date of Office Visit: 2018  Encounter Provider: Cuca Grier MD  Place of Service: [facility] CARDIOLOGY  Patient Name: Doris Coreas  :1928      Patient ID:  Doris Coreas is a 89 y.o. female is here for  followup for HTN and CVA.         History of Present Illness          She has hypertension and hyperlipidemia. She was on Losartan but apparently developed a rash with the Losartan and had to stop this.She was placed on amlodipine at 10 mg daily but then had a lot of ankle edema.      In 10/2010 she did have a stress nuclear perfusion study which was negative for any evidence of ischemia. This was performed at [facility].               She had an echocardiogram done on 2015. There was a normal  ejection fraction of 60% to 65%, elevated left atrial pressure, and trace mitral  insufficiency. She also had a carotid duplex study done on 07/15/2015 which showed 40% to  59% stenosis of the right internal carotid artery and less than 40% stenosis of the left  internal carotid artery.         She was admitted May 2017 with visual changes and left hand weakness.  She was found to have stroke and the MRI of her brain showed multiple strokes in the right middle cerebral artery territory.  The MRA showed no evidence of carotid stenosis.  She's no longer able to drive because of visual changes.  She was placed on Plavix at that time and no ADDI was done.  She did have lower extremity venous duplex studies done May 2017 showing no DVT.  She had an echocardiogram done May 2017 which showed normal ejection fraction 61%, normal saline contrast study and no significant valve disease.  She then wore Ziopatch which showed multiple bursts of paroxysmal atrial fibrillation.  This is likely the source of her stroke.  Her TSH was elevated at 7.7 during that admission.  Her Synthroid dosing was changed then.    She has had no tachycardia, dizziness or syncope.  She's  had no new neurologic symptoms.  She has no dyspnea on exertion.  She's had no chest pain or pressure.  Her appetite is good.  Her energy level is somewhat low.  She is tolerating her medications.  She's had no vomiting blood or blood in the stool.       Past Medical History:   Diagnosis Date   • Carotid artery stenosis    • Chronic renal insufficiency    • Compression fracture of lumbar vertebra    • Difficulty breathing    • Fatigue    • H/O echocardiogram 05/20/2015   • Health care maintenance    • History of EKG 09/25/2015   • Hyperlipidemia    • Hypertension    • Hypothyroidism    • Stroke          Past Surgical History:   Procedure Laterality Date   • CATARACT EXTRACTION     • CHOLECYSTECTOMY     • HERNIA REPAIR         Current Outpatient Prescriptions on File Prior to Visit   Medication Sig Dispense Refill   • B Complex Vitamins (VITAMIN B COMPLEX) tablet Take 1 tablet by mouth Daily.     • carvedilol (COREG) 3.125 MG tablet Take 1 tablet by mouth 2 (Two) Times a Day.     • Cholecalciferol (VITAMIN D PO) Take 1 tablet by mouth Daily.     • ezetimibe-simvastatin (VYTORIN) 10-40 MG per tablet Take 1 tablet by mouth Daily.     • famotidine (PEPCID) 40 MG tablet Take 40 mg by mouth Daily.     • furosemide (LASIX) 40 MG tablet Take  by mouth. Take as needed     • levothyroxine (SYNTHROID, LEVOTHROID) 100 MCG tablet Take 1 tablet by mouth Daily. 30 tablet 0   • potassium chloride (K-DUR) 10 MEQ CR tablet Take 10 mEq by mouth Daily As Needed (when taking lasix).     • rivaroxaban (XARELTO) 20 MG tablet Take 1 tablet by mouth Daily. 90 tablet 3   • [DISCONTINUED] esomeprazole (nexIUM) 40 MG capsule Take 20 mg by mouth Every Night.       No current facility-administered medications on file prior to visit.        Social History     Social History   • Marital status:      Spouse name: N/A   • Number of children: N/A   • Years of education: N/A     Occupational History   • Not on file.     Social History Main  "Topics   • Smoking status: Never Smoker   • Smokeless tobacco: Never Used      Comment: caffeine use   • Alcohol use No   • Drug use: Not on file   • Sexual activity: Not on file     Other Topics Concern   • Not on file     Social History Narrative           Review of Systems   Constitution: Negative.   HENT: Negative for congestion.    Eyes: Negative for vision loss in left eye and vision loss in right eye.   Respiratory: Negative.  Negative for cough, hemoptysis, shortness of breath, sleep disturbances due to breathing, snoring, sputum production and wheezing.    Endocrine: Negative.    Hematologic/Lymphatic: Negative.    Skin: Negative for poor wound healing and rash.   Musculoskeletal: Negative for falls, gout, muscle cramps and myalgias.   Gastrointestinal: Negative for abdominal pain, diarrhea, dysphagia, hematemesis, melena, nausea and vomiting.   Neurological: Negative for excessive daytime sleepiness, dizziness, headaches, light-headedness, loss of balance, seizures and vertigo.   Psychiatric/Behavioral: Negative for depression and substance abuse. The patient is not nervous/anxious.        Procedures    ECG 12 Lead  Date/Time: 3/2/2018 10:40 AM  Performed by: ANITA KHAN  Authorized by: ANITA HKAN   Comparison: compared with previous ECG   Similar to previous ECG  Rhythm: sinus rhythm  Clinical impression: normal ECG               Objective:      Vitals:    03/02/18 1011   BP: 126/80   BP Location: Left arm   Patient Position: Sitting   Pulse: 63   Weight: 69.2 kg (152 lb 9.6 oz)   Height: 154.9 cm (61\")     Body mass index is 28.83 kg/(m^2).    Physical Exam   Constitutional: She is oriented to person, place, and time. She appears well-developed and well-nourished. No distress.   HENT:   Head: Normocephalic and atraumatic.   Eyes: Conjunctivae are normal. No scleral icterus.   Neck: Neck supple. No JVD present. Carotid bruit is not present. No thyromegaly present.   Cardiovascular: " Normal rate, regular rhythm, S1 normal, S2 normal and intact distal pulses.   No extrasystoles are present. PMI is not displaced.  Exam reveals no gallop.    Murmur heard.   Medium-pitched midsystolic murmur is present with a grade of 3/6  at the upper right sternal border, upper left sternal border  Pulses:       Carotid pulses are 2+ on the right side, and 2+ on the left side.       Radial pulses are 2+ on the right side, and 2+ on the left side.        Dorsalis pedis pulses are 2+ on the right side, and 2+ on the left side.        Posterior tibial pulses are 2+ on the right side, and 2+ on the left side.   Pulmonary/Chest: Effort normal and breath sounds normal. No respiratory distress. She has no wheezes. She has no rhonchi. She has no rales. She exhibits no tenderness.   Abdominal: Soft. Bowel sounds are normal. She exhibits no distension, no abdominal bruit and no mass. There is no tenderness.   Musculoskeletal: She exhibits no edema or deformity.   Lymphadenopathy:     She has no cervical adenopathy.   Neurological: She is alert and oriented to person, place, and time. No cranial nerve deficit.   Skin: Skin is warm and dry. No rash noted. She is not diaphoretic. No cyanosis. No pallor. Nails show no clubbing.   Psychiatric: She has a normal mood and affect. Judgment normal.   Vitals reviewed.      Lab Review:       Assessment:      Diagnosis Plan   1. Essential hypertension     2. Cerebrovascular accident (CVA) due to embolism of right middle cerebral artery     3. Mixed hyperlipidemia       1. Hypertension; well controlled.   2. No PERLA, tested 2014.  3. Mild bilateral carotid stenosis. Stable.   4. Hyperlipidemia. We will treat with Vytorin.   5. Renal insufficiency; followed by Dr. Lara.  6. Back pain, stable.   7. CVA due to PAF 5/2017.  on xarelto 20mg daily with food.      Plan:       See back in 1 year, no med changes, doing well.     Atrial Fibrillation and Atrial Flutter  Assessment  • The patient  has paroxysmal atrial fibrillation  • This is non-valvular in etiology  • The patient's CHADS2-VASc score is 6  • A [id number] score of 2 or more is considered a high risk for a thromboembolic event  • Rivaroxaban prescribed    Plan  • Continue in atrial fibrillation with rate control  • Continue rivaroxaban for antithrombotic therapy, bleeding issues discussed  • Continue beta blocker for rate control

## 2018-11-05 ENCOUNTER — OFFICE VISIT (OUTPATIENT)
Dept: CARDIOLOGY | Facility: CLINIC | Age: 83
End: 2018-11-05

## 2018-11-05 VITALS
HEART RATE: 70 BPM | BODY MASS INDEX: 28.28 KG/M2 | DIASTOLIC BLOOD PRESSURE: 80 MMHG | HEIGHT: 61 IN | WEIGHT: 149.8 LBS | SYSTOLIC BLOOD PRESSURE: 130 MMHG

## 2018-11-05 DIAGNOSIS — I63.411 CEREBROVASCULAR ACCIDENT (CVA) DUE TO EMBOLISM OF RIGHT MIDDLE CEREBRAL ARTERY (HCC): ICD-10-CM

## 2018-11-05 DIAGNOSIS — I48.0 PAF (PAROXYSMAL ATRIAL FIBRILLATION) (HCC): ICD-10-CM

## 2018-11-05 DIAGNOSIS — E78.2 MIXED HYPERLIPIDEMIA: ICD-10-CM

## 2018-11-05 DIAGNOSIS — I10 ESSENTIAL HYPERTENSION: Primary | ICD-10-CM

## 2018-11-05 PROCEDURE — 93000 ELECTROCARDIOGRAM COMPLETE: CPT | Performed by: INTERNAL MEDICINE

## 2018-11-05 PROCEDURE — 99214 OFFICE O/P EST MOD 30 MIN: CPT | Performed by: INTERNAL MEDICINE

## 2018-11-05 RX ORDER — CARVEDILOL 3.12 MG/1
3.12 TABLET ORAL 2 TIMES DAILY WITH MEALS
Qty: 180 TABLET | Refills: 3 | COMMUNITY
Start: 2018-11-05 | End: 2021-03-05

## 2018-11-05 NOTE — PROGRESS NOTES
Date of Office Visit: 2018  Encounter Provider: Cuca Grier MD  Place of Service: [facility] CARDIOLOGY  Patient Name: Doris Coreas  :1928      Patient ID:  Doris Coreas is a 89 y.o. female is here for  followup for PAF, HTN.         History of Present Illness        She has hypertension and hyperlipidemia. She was on Losartan but apparently developed a rash with the Losartan and had to stop this.She was placed on amlodipine at 10 mg daily but then had a lot of ankle edema.  In 10/2010 she did have a stress nuclear perfusion study which was negative for any evidence of ischemia. This was performed at [facility].      She was admitted May 2017 with visual changes and left hand weakness.  She was found to have stroke and the MRI of her brain showed multiple strokes in the right middle cerebral artery territory.  The MRA showed no evidence of carotid stenosis.  She's no longer able to drive because of visual changes.  She was placed on Plavix at that time and no ADDI was done.  She did have lower extremity venous duplex studies done May 2017 showing no DVT.  She had an echocardiogram done May 2017 which showed normal ejection fraction 61%, normal saline contrast study and no significant valve disease.  She then wore Ziopatch which showed multiple bursts of paroxysmal atrial fibrillation.  This is likely the source of her stroke.  Her TSH was elevated at 7.7 during that admission.  Her Synthroid dosing was changed then.    She's had no tachycardia, dizziness or syncope.  She had no further neurologic symptoms.  She has no dyspnea on exertion.  She has no chest pain or pressure.  Her energy level is low at times and she is taking her medications as directed.  She is not vomiting blood or having blood in the stool.  She overall feels fairly well.    Past Medical History:   Diagnosis Date   • Carotid artery stenosis    • Chronic renal insufficiency    •  Compression fracture of lumbar vertebra (CMS/HCC)    • Difficulty breathing    • Fatigue    • H/O echocardiogram 05/20/2015   • Health care maintenance    • History of EKG 09/25/2015   • Hyperlipidemia    • Hypertension    • Hypothyroidism    • Stroke (CMS/HCC)          Past Surgical History:   Procedure Laterality Date   • CATARACT EXTRACTION     • CHOLECYSTECTOMY     • HERNIA REPAIR         Current Outpatient Prescriptions on File Prior to Visit   Medication Sig Dispense Refill   • B Complex Vitamins (VITAMIN B COMPLEX) tablet Take 1 tablet by mouth Daily.     • Cholecalciferol (VITAMIN D PO) Take 1 tablet by mouth Daily.     • ezetimibe-simvastatin (VYTORIN) 10-40 MG per tablet Take 1 tablet by mouth Daily.     • famotidine (PEPCID) 40 MG tablet Take 40 mg by mouth Daily.     • furosemide (LASIX) 40 MG tablet Take  by mouth. Take as needed     • ipratropium (ATROVENT) 0.06 % nasal spray 2 sprays into each nostril.     • levothyroxine (SYNTHROID, LEVOTHROID) 88 MCG tablet Take 88 mcg by mouth.     • miconazole (MICOTIN) 2 % powder Apply  topically.     • potassium chloride (K-DUR) 10 MEQ CR tablet Take 10 mEq by mouth Daily As Needed (when taking lasix).     • rivaroxaban (XARELTO) 20 MG tablet Take 1 tablet by mouth Daily. 90 tablet 3   • [DISCONTINUED] carvedilol (COREG) 3.125 MG tablet Take 1 tablet by mouth 2 (Two) Times a Day.     • [DISCONTINUED] sulfamethoxazole-trimethoprim (BACTRIM DS,SEPTRA DS) 800-160 MG per tablet Take 1 tablet by mouth 2 (Two) Times a Day. 14 tablet 0     No current facility-administered medications on file prior to visit.        Social History     Social History   • Marital status:      Spouse name: N/A   • Number of children: N/A   • Years of education: N/A     Occupational History   • Not on file.     Social History Main Topics   • Smoking status: Never Smoker   • Smokeless tobacco: Never Used      Comment: caffeine use   • Alcohol use No   • Drug use: No   • Sexual  "activity: Defer     Other Topics Concern   • Not on file     Social History Narrative   • No narrative on file           Review of Systems   Constitution: Negative.   HENT: Negative for congestion.    Eyes: Negative for vision loss in left eye and vision loss in right eye.   Respiratory: Negative.  Negative for cough, hemoptysis, shortness of breath, sleep disturbances due to breathing, snoring, sputum production and wheezing.    Endocrine: Negative.    Hematologic/Lymphatic: Negative.    Skin: Negative for poor wound healing and rash.   Musculoskeletal: Negative for falls, gout, muscle cramps and myalgias.   Gastrointestinal: Negative for abdominal pain, diarrhea, dysphagia, hematemesis, melena, nausea and vomiting.   Neurological: Negative for excessive daytime sleepiness, dizziness, headaches, light-headedness, loss of balance, seizures and vertigo.   Psychiatric/Behavioral: Negative for depression and substance abuse. The patient is not nervous/anxious.        Procedures    ECG 12 Lead  Date/Time: 11/5/2018 11:01 AM  Performed by: ANITA KHAN  Authorized by: ANITA KHAN   Comparison: compared with previous ECG   Similar to previous ECG  Rhythm: sinus rhythm  Clinical impression: normal ECG                Objective:      Vitals:    11/05/18 1050   BP: 130/80   BP Location: Left arm   Patient Position: Sitting   Pulse: 70   Weight: 67.9 kg (149 lb 12.8 oz)   Height: 154.9 cm (61\")     Body mass index is 28.3 kg/m².    Physical Exam   Constitutional: She is oriented to person, place, and time. She appears well-developed and well-nourished. No distress.   HENT:   Head: Normocephalic and atraumatic.   Eyes: Conjunctivae are normal. No scleral icterus.   Neck: Neck supple. No JVD present. Carotid bruit is not present. No thyromegaly present.   Cardiovascular: Normal rate, regular rhythm, S1 normal, S2 normal, normal heart sounds and intact distal pulses.   No extrasystoles are present. PMI is not " displaced.  Exam reveals no gallop.    No murmur heard.  Pulses:       Carotid pulses are 2+ on the right side, and 2+ on the left side.       Radial pulses are 2+ on the right side, and 2+ on the left side.        Dorsalis pedis pulses are 2+ on the right side, and 2+ on the left side.        Posterior tibial pulses are 2+ on the right side, and 2+ on the left side.   Pulmonary/Chest: Effort normal and breath sounds normal. No respiratory distress. She has no wheezes. She has no rhonchi. She has no rales. She exhibits no tenderness.   Abdominal: Soft. Bowel sounds are normal. She exhibits no distension, no abdominal bruit and no mass. There is no tenderness.   Musculoskeletal: She exhibits no edema or deformity.   Lymphadenopathy:     She has no cervical adenopathy.   Neurological: She is alert and oriented to person, place, and time. No cranial nerve deficit.   Skin: Skin is warm and dry. No rash noted. She is not diaphoretic. No cyanosis. No pallor. Nails show no clubbing.   Psychiatric: She has a normal mood and affect. Judgment normal.   Vitals reviewed.      Lab Review:       Assessment:      Diagnosis Plan   1. Essential hypertension     2. Mixed hyperlipidemia     3. Cerebrovascular accident (CVA) due to embolism of right middle cerebral artery (CMS/HCC)     4. PAF (paroxysmal atrial fibrillation) (CMS/HCC)  ECG 12 Lead     1. Hypertension; well controlled.   2. No PERLA, tested 2014.  3. Mild bilateral carotid stenosis. Stable.   4. Hyperlipidemia. We will treat with Vytorin.   5. Renal insufficiency; followed by Dr. Lara.  6. Back pain, stable.   7. CVA due to PAF 5/2017.  on xarelto 20mg daily with food.           Plan:       See jan in 6 months, no changes.      Atrial Fibrillation and Atrial Flutter  Assessment  • The patient has paroxysmal atrial fibrillation  • This is non-valvular in etiology  • The patient's CHADS2-VASc score is 6  • A [id number] score of 2 or more is considered a high risk for a  thromboembolic event  • Rivaroxaban prescribed    Plan  • Attempt to maintain sinus rhythm  • Continue rivaroxaban for antithrombotic therapy, bleeding issues discussed  • Continue beta blocker for rate control

## 2018-12-17 RX ORDER — RIVAROXABAN 20 MG/1
TABLET, FILM COATED ORAL
Qty: 90 TABLET | Refills: 2 | Status: SHIPPED | OUTPATIENT
Start: 2018-12-17 | End: 2019-08-19 | Stop reason: ALTCHOICE

## 2019-02-01 ENCOUNTER — OFFICE VISIT (OUTPATIENT)
Dept: CARDIOLOGY | Facility: CLINIC | Age: 84
End: 2019-02-01

## 2019-02-01 VITALS
DIASTOLIC BLOOD PRESSURE: 78 MMHG | HEART RATE: 62 BPM | BODY MASS INDEX: 28.34 KG/M2 | WEIGHT: 150.1 LBS | SYSTOLIC BLOOD PRESSURE: 110 MMHG | HEIGHT: 61 IN

## 2019-02-01 DIAGNOSIS — R06.02 SHORTNESS OF BREATH: ICD-10-CM

## 2019-02-01 DIAGNOSIS — I48.0 PAF (PAROXYSMAL ATRIAL FIBRILLATION) (HCC): ICD-10-CM

## 2019-02-01 DIAGNOSIS — I10 ESSENTIAL HYPERTENSION: Primary | ICD-10-CM

## 2019-02-01 DIAGNOSIS — E78.2 MIXED HYPERLIPIDEMIA: ICD-10-CM

## 2019-02-01 DIAGNOSIS — I63.411 CEREBROVASCULAR ACCIDENT (CVA) DUE TO EMBOLISM OF RIGHT MIDDLE CEREBRAL ARTERY (HCC): ICD-10-CM

## 2019-02-01 PROCEDURE — 99214 OFFICE O/P EST MOD 30 MIN: CPT | Performed by: NURSE PRACTITIONER

## 2019-02-01 PROCEDURE — 93000 ELECTROCARDIOGRAM COMPLETE: CPT | Performed by: NURSE PRACTITIONER

## 2019-02-01 RX ORDER — SIMVASTATIN 40 MG
TABLET ORAL NIGHTLY
COMMUNITY
Start: 2018-12-13

## 2019-02-01 RX ORDER — FUROSEMIDE 40 MG/1
40 TABLET ORAL EVERY OTHER DAY
Qty: 30 TABLET | Refills: 3 | Status: SHIPPED | OUTPATIENT
Start: 2019-02-01 | End: 2019-02-07

## 2019-02-01 RX ORDER — AMOXICILLIN 500 MG/1
CAPSULE ORAL DAILY
COMMUNITY
Start: 2018-12-17 | End: 2019-03-01

## 2019-02-01 NOTE — PROGRESS NOTES
Subjective:     Encounter Date:02/01/2019      Patient ID: Doris Coreas is a 89 y.o. female.    Chief Complaint: SOB, HTN  History of Present Illness  She is a new patient to me and I have reviewed her past medical records.  She is a patient of Dr. Grier and was last seen 11/5/18.  She has a history of hypertension, hyperlipidemia, and PAF.     She was on Losartan but apparently developed a rash with the Losartan and had to stop this.She was placed on amlodipine at 10 mg daily but then had a lot of ankle edema.  In 10/2010 she did have a stress nuclear perfusion study which was negative for any evidence of ischemia. This was performed at [facility].      She was admitted May 2017 with visual changes and left hand weakness.  She was found to have stroke and the MRI of her brain showed multiple strokes in the right middle cerebral artery territory.  The MRA showed no evidence of carotid stenosis.  She's no longer able to drive because of visual changes.  She was placed on Plavix at that time and no ADDI was done.  She did have lower extremity venous duplex studies done May 2017 showing no DVT.  She had an echocardiogram done May 2017 which showed normal ejection fraction 61%, normal saline contrast study and no significant valve disease.  She then wore Ziopatch which showed multiple bursts of paroxysmal atrial fibrillation.  This is likely the source of her stroke.  Her TSH was elevated at 7.7 during that admission.  Her Synthroid dosing was changed then.    She recently saw her primary care who ordered a Holter monitor for continued complaints of shortness of breath.    48 hour Holter monitor 1/23/19:  Study Conclusions: The predominant rhythm is sinus at an average heart rate of 70/m ranging from /m.  Rare isolated PVCs.  Rare isolated APCs, some of which are blocked. Rare atrial couplets, rare atrial triplets and one 6 beat run of paroxysmal   atrial tachycardia at a rate of 170/m.  No  "sustained ventricular or atrial arrhythmias noted.  No atrial fibrillation.  No significant pauses.  No diary accompanied this report.    She presents today, 2/1/19, with continued complaints of shortness of breath.  She states this is been for at least the last month.  At the time of her monitor, she also had a chest x-ray which showed some emphysema. She states she was given and instructed on the use of an incentive spirometer and has been using that \"a little bit\".  I encouraged daily use, several times a day.  Her EKG shows sinus rhythm.  She has 1-2+ pedal/ ankle and lower extremity edema, L>R.  She has Lasix that she only takes on a when necessary basis.  She states she has taken it twice this week.  It does help the edema to go down but then it will come right back as soon as she is up and moving the next morning.     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  I feel that this also has a bearing on her shortness of breath along with new findings of emphysema.    The following portions of the patient's history were reviewed and updated as appropriate: allergies, current medications, past family history, past medical history, past social history, past surgical history and problem list.    Review of Systems   Constitution: Negative for weakness and malaise/fatigue.   HENT: Negative for congestion, hoarse voice and sore throat.    Eyes: Negative for blurred vision, double vision, photophobia, vision loss in left eye and vision loss in right eye.   Cardiovascular: Negative for chest pain, dyspnea on exertion, irregular heartbeat, leg swelling, near-syncope, orthopnea, palpitations, paroxysmal nocturnal dyspnea and syncope.   Respiratory: Negative for cough, hemoptysis, sleep disturbances due to breathing, snoring, sputum production and wheezing.  Positive for SOA  Endocrine: Negative.    Hematologic/Lymphatic: Does not " bruise/bleed easily.   Skin: Negative for color change, dry skin, poor wound healing and rash.   Musculoskeletal: Negative for back pain, falls, gout, joint pain, joint swelling, muscle cramps and muscle weakness. Pedal and ankle edema  Gastrointestinal: Negative for abdominal pain, constipation, diarrhea, dysphagia, melena, nausea and vomiting.   Neurological: Negative for excessive daytime sleepiness, dizziness, headaches, light-headedness, loss of balance, numbness, paresthesias, seizures and vertigo.   Psychiatric/Behavioral: Negative for depression and substance abuse. The patient is not nervous/anxious.        ECG 12 Lead  Date/Time: 2/1/2019 3:49 PM  Performed by: Kim Christianson APRN  Authorized by: Kim Christianson APRN   Comparison: compared with previous ECG from 11/5/2018  Similar to previous ECG  Rhythm: sinus rhythm  Rate: normal  Conduction: conduction normal  ST Segments: ST segments normal  T Waves: T waves normal  QRS axis: normal  Clinical impression: normal ECG               Objective:         Physical Exam   Constitutional: He is oriented to person, place, and time. Vital signs are normal. He appears well-developed and well-nourished. No distress.   HENT:   Head: Normocephalic and atraumatic.   Right Ear: Hearing normal.   Left Ear: Hearing normal.   Eyes: Conjunctivae and lids are normal.   Neck: Normal range of motion. Neck supple. No JVD present. Carotid bruit is not present. No thyromegaly present.   Cardiovascular: Normal rate, regular rhythm, S1 normal, S2 normal, normal heart sounds and intact distal pulses.  PMI is not displaced.  Exam reveals no gallop.    No murmur heard.  Pulses:       Carotid pulses are 2+ on the right side, and 2+ on the left side.       Radial pulses are 2+ on the right side, and 2+ on the left side.        Dorsalis pedis pulses are 2+ on the right side, and 2+ on the left side.        Posterior tibial pulses are 2+ on the right side, and 2+ on the left side.  "  Pulmonary/Chest: Effort normal and breath sounds normal. No respiratory distress. He has no wheezes. He has no rhonchi. He has no rales. He exhibits no tenderness.   Abdominal: Soft. Normal appearance and bowel sounds are normal. He exhibits no distension, no abdominal bruit and no mass. There is no tenderness.   Musculoskeletal: Normal range of motion.   Exhibits no deformity 1-2+ pedal/ankle and LE edema  Lymphadenopathy:     He has no cervical adenopathy.   Neurological: He is alert and oriented to person, place, and time. No cranial nerve deficit. Coordination and gait normal.   Oriented to person, place and time.   Skin: Skin is warm, dry and intact. No rash noted. He is not diaphoretic. No cyanosis. Nails show no clubbing.   Psychiatric: He has a normal mood and affect. His speech is normal and behavior is normal. Judgment and thought content normal. Cognition and memory are normal.     Vitals:    02/01/19 1405   BP: 110/78   BP Location: Left arm   Pulse: 62   Weight: 68.1 kg (150 lb 1.6 oz)   Height: 154.9 cm (61\")           Lab Review:       Assessment:          Diagnosis Plan   1. Essential hypertension     2. PAF (paroxysmal atrial fibrillation) (CMS/HCC)     3. Cerebrovascular accident (CVA) due to embolism of right middle cerebral artery (CMS/HCC)     4. Mixed hyperlipidemia     5. Shortness of breath            Plan:       1.  Essential HTN - controlled    2.  PAF - EKG shows NSR rate controlled. On BB and Xarelto  Atrial Fibrillation and Atrial Flutter  Assessment  • The patient has paroxysmal atrial fibrillation  • This is non-valvular in etiology  • The patient's CHADS2-VASc score is 6  • A [id number] score of 2 or more is considered a high risk for a thromboembolic event  • Rivaroxaban prescribed    Plan  • Attempt to maintain sinus rhythm  • Continue rivaroxaban for antithrombotic therapy, bleeding issues discussed  • Continue beta blocker for rhythm control  • Continue beta blocker for rate " control    3.  CVA - due to PAF 5/17- on Xarelto 20 mg daily     4.  Mixed hyperlipidemia - on Vytorin    5.  Shortness of breath -  Continued SOA, not getting better.  She has continued LE, pedal and ankle edema  daily.  She is only taking her lasix sparingly.  She will take lasix 40 mg every other day and call with an update next week. Her kyphosis keeps her from taking deep breaths that exacerbates the problem.  Also, emphysema was found on chest xray from 1/19. Increase use of IS.    6..  No PERLA - tested 2014    7..  Mild bilateral carotid disease - stable    8.  Renal insufficiency - followed by Dr. Lara    9.  Chronic back pain - stable    Call in one week.    Caleb Christianson, FOZIA      Current Outpatient Medications:   •  amoxicillin (AMOXIL) 500 MG capsule, Daily. Per pt, Disp: , Rfl:   •  B Complex Vitamins (VITAMIN B COMPLEX) tablet, Take 1 tablet by mouth Daily., Disp: , Rfl:   •  carvedilol (COREG) 3.125 MG tablet, Take 1 tablet by mouth 2 (Two) Times a Day With Meals., Disp: 180 tablet, Rfl: 3  •  Cholecalciferol (VITAMIN D PO), Take 1 tablet by mouth Daily., Disp: , Rfl:   •  famotidine (PEPCID) 40 MG tablet, Take 40 mg by mouth Daily., Disp: , Rfl:   •  furosemide (LASIX) 40 MG tablet, Take 1 tablet by mouth Every Other Day. Take as needed, Disp: 30 tablet, Rfl: 3  •  ipratropium (ATROVENT) 0.06 % nasal spray, 2 sprays into each nostril., Disp: , Rfl:   •  levothyroxine (SYNTHROID, LEVOTHROID) 88 MCG tablet, Take 88 mcg by mouth., Disp: , Rfl:   •  miconazole (MICOTIN) 2 % powder, Apply  topically., Disp: , Rfl:   •  potassium chloride (K-DUR) 10 MEQ CR tablet, Take 10 mEq by mouth Daily As Needed (when taking lasix)., Disp: , Rfl:   •  simvastatin (ZOCOR) 40 MG tablet, Every Night., Disp: , Rfl:   •  XARELTO 20 MG tablet, TAKE ONE TABLET BY MOUTH DAILY, Disp: 90 tablet, Rfl: 2

## 2019-02-06 ENCOUNTER — TELEPHONE (OUTPATIENT)
Dept: CARDIOLOGY | Facility: CLINIC | Age: 84
End: 2019-02-06

## 2019-02-06 NOTE — TELEPHONE ENCOUNTER
02/06/19  2:06 PM  Doris Coreas  11/12/1928    Home Phone [phone]   Mobile [phone]       Doris Coreas is a patient of Dr. Grier/Kim Christianson. She called in this afternoon saying she is still having SOB all the time, resting or not, and wanted an appt w/Jan. Scheduled her tomorrow at 2pm.    She had no way to take her BP/HR at the moment.    Current cardiac meds are:    Furosemide 40mg q other day  Carvedilol 3.125mg BID  Apixaban 20mg daily    FYI.    Nadine Thompson RN

## 2019-02-07 ENCOUNTER — OFFICE VISIT (OUTPATIENT)
Dept: CARDIOLOGY | Facility: CLINIC | Age: 84
End: 2019-02-07

## 2019-02-07 VITALS
HEIGHT: 60 IN | SYSTOLIC BLOOD PRESSURE: 120 MMHG | HEART RATE: 72 BPM | DIASTOLIC BLOOD PRESSURE: 84 MMHG | BODY MASS INDEX: 28.66 KG/M2 | WEIGHT: 146 LBS

## 2019-02-07 DIAGNOSIS — E78.2 MIXED HYPERLIPIDEMIA: ICD-10-CM

## 2019-02-07 DIAGNOSIS — I48.0 PAF (PAROXYSMAL ATRIAL FIBRILLATION) (HCC): ICD-10-CM

## 2019-02-07 DIAGNOSIS — I10 ESSENTIAL HYPERTENSION: ICD-10-CM

## 2019-02-07 DIAGNOSIS — R06.09 DYSPNEA ON EXERTION: Primary | ICD-10-CM

## 2019-02-07 PROCEDURE — 99213 OFFICE O/P EST LOW 20 MIN: CPT | Performed by: NURSE PRACTITIONER

## 2019-02-07 RX ORDER — FUROSEMIDE 40 MG/1
40 TABLET ORAL DAILY
Qty: 30 TABLET | Refills: 3
Start: 2019-02-07 | End: 2019-05-02

## 2019-02-07 NOTE — PROGRESS NOTES
Subjective:     Encounter Date:02/07/2019      Patient ID: Doris Coreas is a 89 y.o. female.    Chief Complaint:SOB  History of Present Illness    She was on Losartan but apparently developed a rash with the Losartan and had to stop this.She was placed on amlodipine at 10 mg daily but then had a lot of ankle edema.  In 10/2010 she did have a stress nuclear perfusion study which was negative for any evidence of ischemia. This was performed at [facility].      She was admitted May 2017 with visual changes and left hand weakness.  She was found to have stroke and the MRI of her brain showed multiple strokes in the right middle cerebral artery territory.  The MRA showed no evidence of carotid stenosis.  She's no longer able to drive because of visual changes.  She was placed on Plavix at that time and no ADDI was done.  She did have lower extremity venous duplex studies done May 2017 showing no DVT.  She had an echocardiogram done May 2017 which showed normal ejection fraction 61%, normal saline contrast study and no significant valve disease.  She then wore Ziopatch which showed multiple bursts of paroxysmal atrial fibrillation.  This is likely the source of her stroke.  Her TSH was elevated at 7.7 during that admission.  Her Synthroid dosing was changed then.     She recently saw her primary care who ordered a Holter monitor for continued complaints of shortness of breath.     48 hour Holter monitor 1/23/19:  Study Conclusions: The predominant rhythm is sinus at an average heart rate of 70/m ranging from /m.  Rare isolated PVCs.  Rare isolated APCs, some of which are blocked. Rare atrial couplets, rare atrial triplets and one 6 beat run of paroxysmal   atrial tachycardia at a rate of 170/m.  No sustained ventricular or atrial arrhythmias noted.  No atrial fibrillation.  No significant pauses.  No diary accompanied this report.     I saw her on 2/1/19 with continued complaints of shortness of  breath.  She states these had been going on for approximately the last month.  She had a recent Holter monitor as above that showed a 6 beat run of paroxysmal atrial tachycardia.  She is on a beta blocker.  At the time of her monitor she also had a chest x-ray which showed some emphysema.  She was given and instructed on the use of an incentive spirometer and has been using that. She had 1-2+ pedal and ankle and lower extremity edema left greater than right.  She was only taking Lasix very in frequently.  I instructed her to increase it to 1 tablet every other day.    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  I feel that this also has a bearing on her shortness of breath along with new findings of emphysema.    She presents today to/7/19 with continued complaints of shortness of breath.  She has lost 4 pounds since the first.  Her pedal and ankle edema is improved.  She has no edema in her right foot and ankle and continues to have +1 in her left.  She states her left ankle has always been edematous.  She states her breathing is better.    The following portions of the patient's history were reviewed and updated as appropriate: allergies, current medications, past family history, past medical history, past social history, past surgical history and problem list.    Review of Systems   Constitution: Negative for weakness and malaise/fatigue.   HENT: Negative for congestion, hoarse voice and sore throat.    Eyes: Negative for blurred vision, double vision, photophobia, vision loss in left eye and vision loss in right eye.   Cardiovascular: Negative for chest pain,  irregular heartbeat, leg swelling, near-syncope, orthopnea, palpitations, paroxysmal nocturnal dyspnea and syncope. Positive for dyspnea on exertion  Respiratory: Negative for cough, hemoptysis, sleep disturbances due to breathing, snoring, sputum production and  wheezing.  Shortness of breath  Endocrine: Negative.    Hematologic/Lymphatic: Does not bruise/bleed easily.   Skin: Negative for color change, dry skin, poor wound healing and rash.   Musculoskeletal: Negative for back pain, falls, gout, joint pain, joint swelling, muscle cramps and muscle weakness.   Gastrointestinal: Negative for abdominal pain, constipation, diarrhea, dysphagia, melena, nausea and vomiting.   Neurological: Negative for excessive daytime sleepiness, dizziness, headaches, light-headedness, loss of balance, numbness, paresthesias, seizures and vertigo.   Psychiatric/Behavioral: Negative for depression and substance abuse. The patient is not nervous/anxious.      Procedures       Objective:         Physical Exam   Constitutional: He is oriented to person, place, and time. Vital signs are normal. He appears well-developed and well-nourished. No distress.   HENT:   Head: Normocephalic and atraumatic.   Right Ear: Hearing normal.   Left Ear: Hearing normal.   Eyes: Conjunctivae and lids are normal.   Neck: Normal range of motion. Neck supple. No JVD present. Carotid bruit is not present. No thyromegaly present.   Cardiovascular: Normal rate, regular rhythm, S1 normal, S2 normal, normal heart sounds and intact distal pulses.  PMI is not displaced.  Exam reveals no gallop.    No murmur heard.  Pulses:       Carotid pulses are 2+ on the right side, and 2+ on the left side.       Radial pulses are 2+ on the right side, and 2+ on the left side.        Dorsalis pedis pulses are 2+ on the right side, and 2+ on the left side.        Posterior tibial pulses are 2+ on the right side, and 2+ on the left side.   Pulmonary/Chest: Effort normal and breath sounds normal. No respiratory distress. He has no wheezes. He has no rhonchi. He has no rales. He exhibits no tenderness.   Abdominal: Soft. Normal appearance and bowel sounds are normal. He exhibits no distension, no abdominal bruit and no mass. There is no  "tenderness.   Musculoskeletal: Normal range of motion.   Exhibits no deformity +1 pedal/ankle edema left  Lymphadenopathy:     He has no cervical adenopathy.   Neurological: He is alert and oriented to person, place, and time. No cranial nerve deficit. Coordination and gait normal.   Oriented to person, place and time.   Skin: Skin is warm, dry and intact. No rash noted. He is not diaphoretic. No cyanosis. Nails show no clubbing.   Psychiatric: He has a normal mood and affect. His speech is normal and behavior is normal. Judgment and thought content normal. Cognition and memory are normal.     Vitals:    02/07/19 1357   BP: 120/84   Pulse: 72   Weight: 66.2 kg (146 lb)   Height: 152.4 cm (60\")           Lab Review:       Assessment:          Diagnosis Plan   1. Dyspnea on exertion  Adult Transthoracic Echo Complete W/ Cont if Necessary Per Protocol   2. Essential hypertension  Basic Metabolic Panel   3. PAF (paroxysmal atrial fibrillation) (CMS/HCC)     4. Mixed hyperlipidemia            Plan:       1.  Dyspnea on exertion - it has been approximately 2 years since last echo, will check.  Increase lasix to 40 mg daily and check BMP in 1 week.  She will call me next week with an update.    2.  Essential HTN - stable, WNL    3.  PAF - regular rate, controlled, on Xarelto  Atrial Fibrillation and Atrial Flutter  Assessment  • The patient has paroxysmal atrial fibrillation  • This is non-valvular in etiology  • The patient's CHADS2-VASc score is 4  • A [id number] score of 2 or more is considered a high risk for a thromboembolic event  • Rivaroxaban prescribed    Plan  • Attempt to maintain sinus rhythm  • Continue rivaroxaban for antithrombotic therapy, bleeding issues discussed  • Continue beta blocker for rhythm control  • Continue beta blocker for rate control    4.  Hyperlipidemia - on statin therapy    Await results of echo for valvular function.  SOA may be hindered by her kyphosis, COPD and BB. Will make changes " based on results.    FOZIA Jones      Current Outpatient Medications:   •  amoxicillin (AMOXIL) 500 MG capsule, Daily. Per pt, Disp: , Rfl:   •  B Complex Vitamins (VITAMIN B COMPLEX) tablet, Take 1 tablet by mouth Daily., Disp: , Rfl:   •  carvedilol (COREG) 3.125 MG tablet, Take 1 tablet by mouth 2 (Two) Times a Day With Meals., Disp: 180 tablet, Rfl: 3  •  Cholecalciferol (VITAMIN D PO), Take 1 tablet by mouth Daily., Disp: , Rfl:   •  famotidine (PEPCID) 40 MG tablet, Take 40 mg by mouth Daily., Disp: , Rfl:   •  furosemide (LASIX) 40 MG tablet, Take 1 tablet by mouth Daily. Take as needed, Disp: 30 tablet, Rfl: 3  •  ipratropium (ATROVENT) 0.06 % nasal spray, 2 sprays into each nostril., Disp: , Rfl:   •  levothyroxine (SYNTHROID, LEVOTHROID) 88 MCG tablet, Take 88 mcg by mouth., Disp: , Rfl:   •  miconazole (MICOTIN) 2 % powder, Apply  topically., Disp: , Rfl:   •  potassium chloride (K-DUR) 10 MEQ CR tablet, Take 10 mEq by mouth Daily As Needed (when taking lasix)., Disp: , Rfl:   •  simvastatin (ZOCOR) 40 MG tablet, Every Night., Disp: , Rfl:   •  XARELTO 20 MG tablet, TAKE ONE TABLET BY MOUTH DAILY, Disp: 90 tablet, Rfl: 2

## 2019-02-13 ENCOUNTER — LAB (OUTPATIENT)
Dept: LAB | Facility: HOSPITAL | Age: 84
End: 2019-02-13

## 2019-02-13 ENCOUNTER — HOSPITAL ENCOUNTER (OUTPATIENT)
Dept: CARDIOLOGY | Facility: HOSPITAL | Age: 84
Discharge: HOME | End: 2019-02-13
Admitting: NURSE PRACTITIONER

## 2019-02-13 VITALS — BODY MASS INDEX: 28.66 KG/M2 | HEIGHT: 60 IN | WEIGHT: 146 LBS

## 2019-02-13 DIAGNOSIS — I10 ESSENTIAL HYPERTENSION: ICD-10-CM

## 2019-02-13 DIAGNOSIS — R06.09 DYSPNEA ON EXERTION: ICD-10-CM

## 2019-02-13 LAB
ANION GAP SERPL CALCULATED.3IONS-SCNC: 9.2 MMOL/L
BH CV ECHO MEAS - ACS: 1.2 CM
BH CV ECHO MEAS - AO MAX PG (FULL): 8.3 MMHG
BH CV ECHO MEAS - AO MAX PG: 12.3 MMHG
BH CV ECHO MEAS - AO MEAN PG (FULL): 4 MMHG
BH CV ECHO MEAS - AO MEAN PG: 6 MMHG
BH CV ECHO MEAS - AO ROOT AREA (BSA CORRECTED): 2
BH CV ECHO MEAS - AO ROOT AREA: 8.3 CM^2
BH CV ECHO MEAS - AO ROOT DIAM: 3.3 CM
BH CV ECHO MEAS - AO V2 MAX: 175 CM/SEC
BH CV ECHO MEAS - AO V2 MEAN: 117 CM/SEC
BH CV ECHO MEAS - AO V2 VTI: 37.7 CM
BH CV ECHO MEAS - AVA(I,A): 1.8 CM^2
BH CV ECHO MEAS - AVA(I,D): 1.8 CM^2
BH CV ECHO MEAS - AVA(V,A): 1.6 CM^2
BH CV ECHO MEAS - AVA(V,D): 1.6 CM^2
BH CV ECHO MEAS - BSA(HAYCOCK): 1.7 M^2
BH CV ECHO MEAS - BSA: 1.6 M^2
BH CV ECHO MEAS - BZI_BMI: 28.5 KILOGRAMS/M^2
BH CV ECHO MEAS - BZI_METRIC_HEIGHT: 152.4 CM
BH CV ECHO MEAS - BZI_METRIC_WEIGHT: 66.2 KG
BH CV ECHO MEAS - EDV(CUBED): 63 ML
BH CV ECHO MEAS - EDV(MOD-SP2): 33 ML
BH CV ECHO MEAS - EDV(MOD-SP4): 34.8 ML
BH CV ECHO MEAS - EDV(TEICH): 69.2 ML
BH CV ECHO MEAS - EF(CUBED): 81.7 %
BH CV ECHO MEAS - EF(MOD-BP): 57 %
BH CV ECHO MEAS - EF(MOD-SP2): 55.5 %
BH CV ECHO MEAS - EF(MOD-SP4): 54.9 %
BH CV ECHO MEAS - EF(TEICH): 74.9 %
BH CV ECHO MEAS - ESV(CUBED): 11.5 ML
BH CV ECHO MEAS - ESV(MOD-SP2): 14.7 ML
BH CV ECHO MEAS - ESV(MOD-SP4): 15.7 ML
BH CV ECHO MEAS - ESV(TEICH): 17.3 ML
BH CV ECHO MEAS - FS: 43.2 %
BH CV ECHO MEAS - IVS/LVPW: 1.4
BH CV ECHO MEAS - IVSD: 1.2 CM
BH CV ECHO MEAS - LA DIMENSION: 2.8 CM
BH CV ECHO MEAS - LA/AO: 0.86
BH CV ECHO MEAS - LAT PEAK E' VEL: 7 CM/SEC
BH CV ECHO MEAS - LV DIASTOLIC VOL/BSA (35-75): 21.3 ML/M^2
BH CV ECHO MEAS - LV MASS(C)D: 137.3 GRAMS
BH CV ECHO MEAS - LV MASS(C)DI: 84.1 GRAMS/M^2
BH CV ECHO MEAS - LV MAX PG: 4 MMHG
BH CV ECHO MEAS - LV MEAN PG: 2 MMHG
BH CV ECHO MEAS - LV SYSTOLIC VOL/BSA (12-30): 9.6 ML/M^2
BH CV ECHO MEAS - LV V1 MAX: 100 CM/SEC
BH CV ECHO MEAS - LV V1 MEAN: 68.7 CM/SEC
BH CV ECHO MEAS - LV V1 VTI: 24.2 CM
BH CV ECHO MEAS - LVIDD: 4 CM
BH CV ECHO MEAS - LVIDS: 2.3 CM
BH CV ECHO MEAS - LVLD AP2: 5.6 CM
BH CV ECHO MEAS - LVLD AP4: 6.2 CM
BH CV ECHO MEAS - LVLS AP2: 5.3 CM
BH CV ECHO MEAS - LVLS AP4: 5.3 CM
BH CV ECHO MEAS - LVOT AREA (M): 2.8 CM^2
BH CV ECHO MEAS - LVOT AREA: 2.8 CM^2
BH CV ECHO MEAS - LVOT DIAM: 1.9 CM
BH CV ECHO MEAS - LVPWD: 0.9 CM
BH CV ECHO MEAS - MED PEAK E' VEL: 7 CM/SEC
BH CV ECHO MEAS - MV A DUR: 0.11 SEC
BH CV ECHO MEAS - MV A MAX VEL: 141 CM/SEC
BH CV ECHO MEAS - MV DEC SLOPE: 451 CM/SEC^2
BH CV ECHO MEAS - MV DEC TIME: 0.28 SEC
BH CV ECHO MEAS - MV E MAX VEL: 126 CM/SEC
BH CV ECHO MEAS - MV E/A: 0.89
BH CV ECHO MEAS - MV MAX PG: 7.7 MMHG
BH CV ECHO MEAS - MV MEAN PG: 4 MMHG
BH CV ECHO MEAS - MV P1/2T MAX VEL: 140 CM/SEC
BH CV ECHO MEAS - MV P1/2T: 90.9 MSEC
BH CV ECHO MEAS - MV V2 MAX: 139 CM/SEC
BH CV ECHO MEAS - MV V2 MEAN: 92.7 CM/SEC
BH CV ECHO MEAS - MV V2 VTI: 45.3 CM
BH CV ECHO MEAS - MVA P1/2T LCG: 1.6 CM^2
BH CV ECHO MEAS - MVA(P1/2T): 2.4 CM^2
BH CV ECHO MEAS - MVA(VTI): 1.5 CM^2
BH CV ECHO MEAS - PA ACC TIME: 0.06 SEC
BH CV ECHO MEAS - PA MAX PG (FULL): 0.7 MMHG
BH CV ECHO MEAS - PA MAX PG: 2.1 MMHG
BH CV ECHO MEAS - PA PR(ACCEL): 53.8 MMHG
BH CV ECHO MEAS - PA V2 MAX: 71.9 CM/SEC
BH CV ECHO MEAS - PULM A REVS DUR: 0.12 SEC
BH CV ECHO MEAS - PULM A REVS VEL: 33.5 CM/SEC
BH CV ECHO MEAS - PULM DIAS VEL: 42.7 CM/SEC
BH CV ECHO MEAS - PULM S/D: 0.82
BH CV ECHO MEAS - PULM SYS VEL: 34.9 CM/SEC
BH CV ECHO MEAS - PVA(V,A): 3.4 CM^2
BH CV ECHO MEAS - PVA(V,D): 3.4 CM^2
BH CV ECHO MEAS - QP/QS: 0.85
BH CV ECHO MEAS - RAP SYSTOLE: 3 MMHG
BH CV ECHO MEAS - RV MAX PG: 1.4 MMHG
BH CV ECHO MEAS - RV MEAN PG: 1 MMHG
BH CV ECHO MEAS - RV V1 MAX: 58.4 CM/SEC
BH CV ECHO MEAS - RV V1 MEAN: 43.2 CM/SEC
BH CV ECHO MEAS - RV V1 VTI: 14.1 CM
BH CV ECHO MEAS - RVOT AREA: 4.2 CM^2
BH CV ECHO MEAS - RVOT DIAM: 2.3 CM
BH CV ECHO MEAS - RVSP: 26 MMHG
BH CV ECHO MEAS - SI(AO): 191.5 ML/M^2
BH CV ECHO MEAS - SI(CUBED): 31.5 ML/M^2
BH CV ECHO MEAS - SI(LVOT): 42 ML/M^2
BH CV ECHO MEAS - SI(MOD-SP2): 11.2 ML/M^2
BH CV ECHO MEAS - SI(MOD-SP4): 11.7 ML/M^2
BH CV ECHO MEAS - SI(TEICH): 31.7 ML/M^2
BH CV ECHO MEAS - SV(AO): 312.8 ML
BH CV ECHO MEAS - SV(CUBED): 51.5 ML
BH CV ECHO MEAS - SV(LVOT): 68.6 ML
BH CV ECHO MEAS - SV(MOD-SP2): 18.3 ML
BH CV ECHO MEAS - SV(MOD-SP4): 19.1 ML
BH CV ECHO MEAS - SV(RVOT): 58.6 ML
BH CV ECHO MEAS - SV(TEICH): 51.8 ML
BH CV ECHO MEAS - TAPSE (>1.6): 1.6 CM2
BH CV ECHO MEAS - TR MAX VEL: 257 CM/SEC
BH CV ECHO MEASUREMENTS AVERAGE E/E' RATIO: 18
BH CV VAS BP RIGHT ARM: NORMAL MMHG
BH CV XLRA - TDI S': 10 CM/SEC
BUN BLD-MCNC: 18 MG/DL (ref 8–23)
BUN/CREAT SERPL: 19.6 (ref 7–25)
CALCIUM SPEC-SCNC: 8.9 MG/DL (ref 8.2–9.6)
CHLORIDE SERPL-SCNC: 102 MMOL/L (ref 98–107)
CO2 SERPL-SCNC: 28.8 MMOL/L (ref 22–29)
CREAT BLD-MCNC: 0.92 MG/DL (ref 0.57–1)
GFR SERPL CREATININE-BSD FRML MDRD: 57 ML/MIN/1.73
GLUCOSE BLD-MCNC: 94 MG/DL (ref 65–99)
LEFT ATRIUM VOLUME INDEX: 25 ML/M2
MAXIMAL PREDICTED HEART RATE: 130 BPM
POTASSIUM BLD-SCNC: 4.4 MMOL/L (ref 3.5–5.2)
SODIUM BLD-SCNC: 140 MMOL/L (ref 136–145)
STRESS TARGET HR: 111 BPM

## 2019-02-13 PROCEDURE — 36415 COLL VENOUS BLD VENIPUNCTURE: CPT

## 2019-02-13 PROCEDURE — 93306 TTE W/DOPPLER COMPLETE: CPT

## 2019-02-13 PROCEDURE — 80048 BASIC METABOLIC PNL TOTAL CA: CPT

## 2019-02-13 PROCEDURE — 93306 TTE W/DOPPLER COMPLETE: CPT | Performed by: INTERNAL MEDICINE

## 2019-02-14 DIAGNOSIS — R06.09 DYSPNEA ON EXERTION: ICD-10-CM

## 2019-02-14 DIAGNOSIS — I48.0 PAROXYSMAL ATRIAL FIBRILLATION (HCC): Primary | ICD-10-CM

## 2019-02-21 ENCOUNTER — HOSPITAL ENCOUNTER (OUTPATIENT)
Dept: NUCLEAR MEDICINE | Facility: HOSPITAL | Age: 84
Discharge: HOME | End: 2019-02-21

## 2019-02-21 ENCOUNTER — HOSPITAL ENCOUNTER (OUTPATIENT)
Dept: CARDIOLOGY | Facility: HOSPITAL | Age: 84
Discharge: HOME | End: 2019-02-21

## 2019-02-21 DIAGNOSIS — R06.09 DYSPNEA ON EXERTION: ICD-10-CM

## 2019-02-21 DIAGNOSIS — I48.0 PAROXYSMAL ATRIAL FIBRILLATION (HCC): ICD-10-CM

## 2019-02-21 LAB
BH CV NUCLEAR PRIOR STUDY: 3
BH CV STRESS BP STAGE 1: NORMAL
BH CV STRESS COMMENTS STAGE 1: NORMAL
BH CV STRESS DOSE REGADENOSON STAGE 1: 0.4
BH CV STRESS DURATION MIN STAGE 1: 0
BH CV STRESS DURATION SEC STAGE 1: 30
BH CV STRESS HR STAGE 1: 84
BH CV STRESS O2 STAGE 1: 100
BH CV STRESS PROTOCOL 1: NORMAL
BH CV STRESS RECOVERY BP: NORMAL MMHG
BH CV STRESS RECOVERY HR: 78 BPM
BH CV STRESS RECOVERY O2: 99 %
BH CV STRESS STAGE 1: 1
LV EF NUC BP: 70 %
MAXIMAL PREDICTED HEART RATE: 130 BPM
PERCENT MAX PREDICTED HR: 70.77 %
STRESS BASELINE BP: NORMAL MMHG
STRESS BASELINE HR: 60 BPM
STRESS O2 SAT REST: 98 %
STRESS PERCENT HR: 83 %
STRESS POST ESTIMATED WORKLOAD: 1 METS
STRESS POST EXERCISE DUR SEC: 30 SEC
STRESS POST O2 SAT PEAK: 100 %
STRESS POST PEAK BP: NORMAL MMHG
STRESS POST PEAK HR: 92 BPM
STRESS TARGET HR: 111 BPM

## 2019-02-21 PROCEDURE — 25010000002 REGADENOSON 0.4 MG/5ML SOLUTION: Performed by: NURSE PRACTITIONER

## 2019-02-21 PROCEDURE — 93017 CV STRESS TEST TRACING ONLY: CPT

## 2019-02-21 PROCEDURE — 0 TECHNETIUM SESTAMIBI: Performed by: NURSE PRACTITIONER

## 2019-02-21 PROCEDURE — 93016 CV STRESS TEST SUPVJ ONLY: CPT | Performed by: INTERNAL MEDICINE

## 2019-02-21 PROCEDURE — A9500 TC99M SESTAMIBI: HCPCS | Performed by: NURSE PRACTITIONER

## 2019-02-21 PROCEDURE — 78452 HT MUSCLE IMAGE SPECT MULT: CPT | Performed by: INTERNAL MEDICINE

## 2019-02-21 PROCEDURE — 78452 HT MUSCLE IMAGE SPECT MULT: CPT

## 2019-02-21 PROCEDURE — 93018 CV STRESS TEST I&R ONLY: CPT | Performed by: INTERNAL MEDICINE

## 2019-02-21 RX ADMIN — TECHNETIUM TC 99M SESTAMIBI 1 DOSE: 1 INJECTION INTRAVENOUS at 09:45

## 2019-02-21 RX ADMIN — TECHNETIUM TC 99M SESTAMIBI 1 DOSE: 1 INJECTION INTRAVENOUS at 07:39

## 2019-02-21 RX ADMIN — REGADENOSON 0.4 MG: 0.08 INJECTION, SOLUTION INTRAVENOUS at 09:46

## 2019-03-01 ENCOUNTER — OFFICE VISIT (OUTPATIENT)
Dept: CARDIOLOGY | Facility: CLINIC | Age: 84
End: 2019-03-01

## 2019-03-01 VITALS
DIASTOLIC BLOOD PRESSURE: 78 MMHG | WEIGHT: 150.2 LBS | SYSTOLIC BLOOD PRESSURE: 116 MMHG | HEIGHT: 60 IN | BODY MASS INDEX: 29.49 KG/M2 | HEART RATE: 68 BPM

## 2019-03-01 DIAGNOSIS — I48.0 PAF (PAROXYSMAL ATRIAL FIBRILLATION) (HCC): ICD-10-CM

## 2019-03-01 DIAGNOSIS — R94.39 ABNORMAL CARDIOVASCULAR STRESS TEST: ICD-10-CM

## 2019-03-01 DIAGNOSIS — I10 ESSENTIAL HYPERTENSION: ICD-10-CM

## 2019-03-01 DIAGNOSIS — E78.2 MIXED HYPERLIPIDEMIA: ICD-10-CM

## 2019-03-01 PROCEDURE — 99214 OFFICE O/P EST MOD 30 MIN: CPT | Performed by: NURSE PRACTITIONER

## 2019-03-01 RX ORDER — ISOSORBIDE MONONITRATE 30 MG/1
30 TABLET, EXTENDED RELEASE ORAL EVERY MORNING
Qty: 90 TABLET | Refills: 3 | Status: SHIPPED | OUTPATIENT
Start: 2019-03-01 | End: 2019-05-02 | Stop reason: ALTCHOICE

## 2019-03-01 NOTE — PROGRESS NOTES
Subjective:     Encounter Date:03/01/2019      Patient ID: Doris Coreas is a 89 y.o. female.    Chief Complaint: stress test results  History of Present Illness  She was on Losartan but apparently developed a rash with the Losartan and had to stop this.She was placed on amlodipine at 10 mg daily but then had a lot of ankle edema.  In 10/2010 she did have a stress nuclear perfusion study which was negative for any evidence of ischemia. This was performed at [facility].      She was admitted May 2017 with visual changes and left hand weakness.  She was found to have stroke and the MRI of her brain showed multiple strokes in the right middle cerebral artery territory.  The MRA showed no evidence of carotid stenosis.  She's no longer able to drive because of visual changes.  She was placed on Plavix at that time and no ADDI was done.  She did have lower extremity venous duplex studies done May 2017 showing no DVT.  She had an echocardiogram done May 2017 which showed normal ejection fraction 61%, normal saline contrast study and no significant valve disease.  She then wore Ziopatch which showed multiple bursts of paroxysmal atrial fibrillation.  This is likely the source of her stroke.  Her TSH was elevated at 7.7 during that admission.  Her Synthroid dosing was changed then.     She recently saw her primary care who ordered a Holter monitor for continued complaints of shortness of breath.     48 hour Holter monitor 1/23/19:  Study Conclusions: The predominant rhythm is sinus at an average heart rate of 70/m ranging from /m.  Rare isolated PVCs.  Rare isolated APCs, some of which are blocked. Rare atrial couplets, rare atrial triplets and one 6 beat run of paroxysmal   atrial tachycardia at a rate of 170/m.  No sustained ventricular or atrial arrhythmias noted.  No atrial fibrillation.  No significant pauses.  No diary accompanied this report.     I saw her on 2/1/19 with continued complaints of  shortness of breath.  She states these had been going on for approximately the last month.  She had a recent Holter monitor as above that showed a 6 beat run of paroxysmal atrial tachycardia.  She is on a beta blocker.  At the time of her monitor she also had a chest x-ray which showed some emphysema.  She was given and instructed on the use of an incentive spirometer and has been using that. She had 1-2+ pedal and ankle and lower extremity edema left greater than right.  She was only taking Lasix very in frequently.  I instructed her to increase it to 1 tablet every other day.     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  I feel that this also has a bearing on her shortness of breath along with new findings of emphysema.     She presents today to/7/19 with continued complaints of shortness of breath.  She has lost 4 pounds since the first.  Her pedal and ankle edema is improved.  She has no edema in her right foot and ankle and continues to have +1 in her left.  She states her left ankle has always been edematous.  Her breathing had improved.  An echo was performed with results below.    Interpretation Summary echo 2/13/19    · Left ventricular systolic function is normal. Calculated EF = 57.0%. Estimated EF was in agreement with the calculated EF. Normal left ventricular cavity size noted. Left ventricular wall thickness is consistent with mild concentric hypertrophy. Left ventricular diastolic dysfunction is noted (grade I a w/high LAP) consistent with impaired relaxation.  · The aortic valve is not well visualized. The aortic valve is abnormal in structure. There is thickening of the aortic valve. Trace aortic valve regurgitation is present. No hemodynamically significant aortic valve stenosis is present.        She continued to had symptoms so we ordered a stress test with results below.    Interpretation Summary  stress test  2/21/19    · Left ventricular ejection fraction is normal (Calculated EF = 70%).  · Myocardial perfusion imaging indicates a small-to-medium-sized, mildly severe area of ischemia located in the lateral wall.        I discussed the results with the patient and her daughter.  She wanted some time to think about it.    She returns today, 3/1/19,  with her daughter and son-in-law to discuss options.  We had a long discussion about possible options.  We discussed a possible cardiac catheterization and different scenarios with what may or may not be done.  We also talked about medical therapy including adding Ranexa or Imdur.  After some thought she and the family decided that we would try the Imdur once a day for 2 months and after that time if she felt no improvement in her shortness of breath, most likely she would pursue a cardiac catheterization.    At last visit she was taking her Lasix every day.  At today's visit she is actually up 4 pounds with increasing bilateral pedal and ankle edema.  She states for the last 3 days she has not been taking her Lasix.  She denies any palpitations, chest pain chest tightness, lightheadedness or dizziness.  She still has the shortness of air that had improved and is now a little worse.  She does have fatigue.  She is on Xarelto and denies any bleeding.    The following portions of the patient's history were reviewed and updated as appropriate: allergies, current medications, past family history, past medical history, past social history, past surgical history and problem list.    Review of Systems   Constitution: Negative for weakness and malaise positive for fatigue  HENT: Negative for congestion, hoarse voice and sore throat.    Eyes: Negative for blurred vision, double vision, photophobia, vision loss in left eye and vision loss in right eye.   Cardiovascular: Negative for chest pain,  irregular heartbeat, leg swelling, near-syncope, orthopnea, palpitations,  paroxysmal nocturnal dyspnea and syncope. dyspnea on exertion  Respiratory: Negative for cough, hemoptysis,  sleep disturbances due to breathing, snoring, sputum production and wheezing.  Positive for shortness of breath  Endocrine: Negative.    Hematologic/Lymphatic: Does not bruise/bleed easily.   Skin: Negative for color change, dry skin, poor wound healing and rash.   Musculoskeletal: Negative for back pain, falls, gout, joint pain, joint swelling, muscle cramps and muscle weakness.   Gastrointestinal: Negative for abdominal pain, constipation, diarrhea, dysphagia, melena, nausea and vomiting.   Neurological: Negative for excessive daytime sleepiness, dizziness, headaches, light-headedness, loss of balance, numbness, paresthesias, seizures and vertigo.   Psychiatric/Behavioral: Negative for depression and substance abuse. The patient is not nervous/anxious.      Procedures       Objective:         Physical Exam   Constitutional: He is oriented to person, place, and time. Vital signs are normal. He appears well-developed and well-nourished. No distress.   HENT:   Head: Normocephalic and atraumatic.   Right Ear: Hearing normal.   Left Ear: Hearing normal.   Eyes: Conjunctivae and lids are normal.   Neck: Normal range of motion. Neck supple. No JVD present. Carotid bruit is not present. No thyromegaly present.   Cardiovascular: Normal rate, regular rhythm, S1 normal, S2 normal, normal heart sounds and intact distal pulses.  PMI is not displaced.  Exam reveals no gallop.    No murmur heard.  Pulses:       Carotid pulses are 2+ on the right side, and 2+ on the left side.       Radial pulses are 2+ on the right side, and 2+ on the left side.        Dorsalis pedis pulses are 2+ on the right side, and 2+ on the left side.        Posterior tibial pulses are 2+ on the right side, and 2+ on the left side.   Pulmonary/Chest: Effort normal and breath sounds normal. No respiratory distress. He has no wheezes. He has no  "rhonchi. He has no rales. He exhibits no tenderness.   Abdominal: Soft. Normal appearance and bowel sounds are normal. He exhibits no distension, no abdominal bruit and no mass. There is no tenderness.   Musculoskeletal: Normal range of motion.   Exhibits no  deformity Bilateral lower extremity edema left greater than right 1-2+ pitting  lymphadenopathy:     He has no cervical adenopathy.   Neurological: He is alert and oriented to person, place, and time. No cranial nerve deficit. Coordination and gait normal.   Oriented to person, place and time.   Skin: Skin is warm, dry and intact. No rash noted. He is not diaphoretic. No cyanosis. Nails show no clubbing.   Psychiatric: He has a normal mood and affect. His speech is normal and behavior is normal. Judgment and thought content normal. Cognition and memory are normal.     Vitals:    03/01/19 1352   BP: 116/78   BP Location: Left arm   Patient Position: Sitting   Pulse: 68   Weight: 68.1 kg (150 lb 3.2 oz)   Height: 152.1 cm (59.88\")           Lab Review:       Assessment:          Diagnosis Plan   1. Essential hypertension     2. Abnormal cardiovascular stress test     3. Mixed hyperlipidemia     4. PAF (paroxysmal atrial fibrillation) (CMS/[facility])            Plan:         1.  Essential hypertension-well controlled.  2.  Abnormal cardiovascular stress test-Myocardial perfusion imaging indicates a small-to-medium-sized, mildly severe area of ischemia located in the lateral wall.  EF per echo is 57%.  Long discussion concerning possible options including cardiac catheterization versus medication.  She would like to try isosorbide mononitrate 30 mg daily.  She will take this every day for 2 months and then follow-up in our office to discuss outcomes.  3.  Mixed hyperlipidemia.  She continues on the Zocor.  4.  PAF-she is rate controlled and on Xarelto  Atrial Fibrillation and Atrial Flutter  Assessment  • The patient has paroxysmal atrial fibrillation  • This is " non-valvular in etiology  • The patient's CHADS2-VASc score is 4  • A [id number] score of 2 or more is considered a high risk for a thromboembolic event  • Rivaroxaban prescribed    Plan  • Attempt to maintain sinus rhythm  • Continue rivaroxaban for antithrombotic therapy, bleeding issues discussed  • Continue beta blocker for rhythm control  • Continue beta blocker for rate control    We spent greater than 25 minutes discussing her treatment options including cardiac cath, possible intervention strategies, versus medications.    RTO in 2 months with RM    FOZIA Jones      Current Outpatient Medications:   •  B Complex Vitamins (VITAMIN B COMPLEX) tablet, Take 1 tablet by mouth Daily., Disp: , Rfl:   •  carvedilol (COREG) 3.125 MG tablet, Take 1 tablet by mouth 2 (Two) Times a Day With Meals., Disp: 180 tablet, Rfl: 3  •  Cholecalciferol (VITAMIN D PO), Take 1 tablet by mouth Daily., Disp: , Rfl:   •  famotidine (PEPCID) 40 MG tablet, Take 40 mg by mouth Daily., Disp: , Rfl:   •  furosemide (LASIX) 40 MG tablet, Take 1 tablet by mouth Daily. Take as needed, Disp: 30 tablet, Rfl: 3  •  ipratropium (ATROVENT) 0.06 % nasal spray, 2 sprays into each nostril., Disp: , Rfl:   •  levothyroxine (SYNTHROID, LEVOTHROID) 88 MCG tablet, Take 88 mcg by mouth., Disp: , Rfl:   •  miconazole (MICOTIN) 2 % powder, Apply  topically., Disp: , Rfl:   •  potassium chloride (K-DUR) 10 MEQ CR tablet, Take 10 mEq by mouth Daily As Needed (when taking lasix)., Disp: , Rfl:   •  simvastatin (ZOCOR) 40 MG tablet, Every Night., Disp: , Rfl:   •  XARELTO 20 MG tablet, TAKE ONE TABLET BY MOUTH DAILY, Disp: 90 tablet, Rfl: 2  •  isosorbide mononitrate (IMDUR) 30 MG 24 hr tablet, Take 1 tablet by mouth Every Morning., Disp: 90 tablet, Rfl: 3

## 2019-05-01 NOTE — PROGRESS NOTES
Subjective:     Encounter Date:05/02/2019      Patient ID: Doris Coreas is a 89 y.o. female.    Chief Complaint:SOB, LE edema  History of Present Illness   She is a patient of Dr. Grier,  History brought forward for continuity.    She was on Losartan but apparently developed a rash with the Losartan and had to stop this.She was placed on amlodipine at 10 mg daily but then had a lot of ankle edema.  In 10/2010 she did have a stress nuclear perfusion study which was negative for any evidence of ischemia. This was performed at [facility].      She was admitted May 2017 with visual changes and left hand weakness.  She was found to have stroke and the MRI of her brain showed multiple strokes in the right middle cerebral artery territory.  The MRA showed no evidence of carotid stenosis.  She's no longer able to drive because of visual changes.  She was placed on Plavix at that time and no ADDI was done.  She did have lower extremity venous duplex studies done May 2017 showing no DVT.  She had an echocardiogram done May 2017 which showed normal ejection fraction 61%, normal saline contrast study and no significant valve disease.  She then wore Ziopatch which showed multiple bursts of paroxysmal atrial fibrillation.  This is likely the source of her stroke.  Her TSH was elevated at 7.7 during that admission.  Her Synthroid dosing was changed then.     She recently saw her primary care who ordered a Holter monitor for continued complaints of shortness of breath.     48 hour Holter monitor 1/23/19:  Study Conclusions: The predominant rhythm is sinus at an average heart rate of 70/m ranging from /m.  Rare isolated PVCs.  Rare isolated APCs, some of which are blocked. Rare atrial couplets, rare atrial triplets and one 6 beat run of paroxysmal   atrial tachycardia at a rate of 170/m.  No sustained ventricular or atrial arrhythmias noted.  No atrial fibrillation.  No significant pauses.  No diary  accompanied this report.     I saw her on 2/1/19 with continued complaints of shortness of breath.  She states these had been going on for approximately the last month.  She had a recent Holter monitor as above that showed a 6 beat run of paroxysmal atrial tachycardia.  She is on a beta blocker.  At the time of her monitor she also had a chest x-ray which showed some emphysema.  She was given and instructed on the use of an incentive spirometer and has been using that. She had 1-2+ pedal and ankle and lower extremity edema left greater than right.  She was only taking Lasix very in frequently.  I instructed her to increase it to 1 tablet every other day.     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  I feel that this also has a bearing on her shortness of breath along with new findings of emphysema.     She presents today to/7/19 with continued complaints of shortness of breath.  She has lost 4 pounds since the first.  Her pedal and ankle edema is improved.  She has no edema in her right foot and ankle and continues to have +1 in her left.  She states her left ankle has always been edematous.  Her breathing had improved.  An echo was performed with results below.     Interpretation Summary echo 2/13/19     · Left ventricular systolic function is normal. Calculated EF = 57.0%. Estimated EF was in agreement with the calculated EF. Normal left ventricular cavity size noted. Left ventricular wall thickness is consistent with mild concentric hypertrophy. Left ventricular diastolic dysfunction is noted (grade I a w/high LAP) consistent with impaired relaxation.  · The aortic valve is not well visualized. The aortic valve is abnormal in structure. There is thickening of the aortic valve. Trace aortic valve regurgitation is present. No hemodynamically significant aortic valve stenosis is present.         She continued to had  symptoms so we ordered a stress test with results below.     Interpretation Summary stress test  2/21/19     · Left ventricular ejection fraction is normal (Calculated EF = 70%).  · Myocardial perfusion imaging indicates a small-to-medium-sized, mildly severe area of ischemia located in the lateral wall.         I discussed the results with the patient and her daughter.  She wanted some time to think about it.     I saw her on 3/1/19,  with her daughter and son-in-law to discuss options.  We had a long discussion about possible options.  We discussed a possible cardiac catheterization and different scenarios with what may or may not be done.  We also talked about medical therapy including adding Ranexa or Imdur.  After some thought she and the family decided that we would try the Imdur once a day for 2 months and after that time if she felt no improvement in her shortness of breath, most likely she would pursue a cardiac catheterization.    She was taking her Lasix every day.  However at that visit she was up 4 pounds with increasing bilateral pedal and ankle edema.  She stated that she had not taking her Lasix for the last 3 days.     At last visit she was taking her Lasix every day.  At today's visit she is actually up 4 pounds with increasing bilateral pedal and ankle edema.  She states for the last 3 days she has not been taking her Lasix.      She presents today, 5/2/2019, for a 2-month follow-up after starting Imdur.  She states she is feeling fairly well.  She has had no chest pain or chest tightness.  She denies any lightheadedness or dizziness.  She has had no palpitations.  She is chronically short of breath and states it is no worse.  She continues to have lower extremity edema.  She states it does go down at night and then as the day wears on, it comes back.  She is taking her Lasix 40 mg every morning.  Her weight has remained stable.  She is very kyphotic which also hinders her breathing.    At this  point she does not want to pursue cardiac catheterization based on her abnormal stress test.  She is not having any angina.    The following portions of the patient's history were reviewed and updated as appropriate: allergies, current medications, past family history, past medical history, past social history, past surgical history and problem list.    Review of Systems   Constitution: Negative for weakness and malaise/fatigue.   HENT: Negative for congestion, hoarse voice and sore throat.    Eyes: Negative for blurred vision, double vision, photophobia, vision loss in left eye and vision loss in right eye.   Cardiovascular: Negative for chest pain, dyspnea on exertion, irregular heartbeat, leg swelling, near-syncope, orthopnea, palpitations, paroxysmal nocturnal dyspnea and syncope. Pedal edema  Respiratory: Negative for cough, hemoptysis, shortness of breath, sleep disturbances due to breathing, snoring, sputum production and wheezing.    Endocrine: Negative.    Hematologic/Lymphatic: Does not bruise/bleed easily.   Skin: Negative for color change, dry skin, poor wound healing and rash.   Musculoskeletal: Negative for back pain, falls, gout, joint pain, joint swelling, muscle cramps and muscle weakness.   Gastrointestinal: Negative for abdominal pain, constipation, diarrhea, dysphagia, melena, nausea and vomiting.   Neurological: Negative for excessive daytime sleepiness, dizziness, headaches, light-headedness, loss of balance, numbness, paresthesias, seizures and vertigo.   Psychiatric/Behavioral: Negative for depression and substance abuse. The patient is not nervous/anxious.      Procedures       Objective:         Physical Exam   Constitutional: He is oriented to person, place, and time. Vital signs are normal. He appears well-developed and well-nourished. No distress.   HENT:   Head: Normocephalic and atraumatic.   Right Ear: Hearing normal.   Left Ear: Hearing normal.   Eyes: Conjunctivae and lids are  "normal.   Neck: Normal range of motion. Neck supple. No JVD present. Carotid bruit is not present. No thyromegaly present.   Cardiovascular: Normal rate, regular rhythm, S1 normal, S2 normal, normal heart sounds and intact distal pulses.  PMI is not displaced.  Exam reveals no gallop.    No murmur heard.  Pulses:       Carotid pulses are 2+ on the right side, and 2+ on the left side.       Radial pulses are 2+ on the right side, and 2+ on the left side.        Dorsalis pedis pulses are 2+ on the right side, and 2+ on the left side.        Posterior tibial pulses are 2+ on the right side, and 2+ on the left side.   Pulmonary/Chest: Effort normal and breath sounds normal. No respiratory distress. He has no wheezes. He has no rhonchi. He has no rales. He exhibits no tenderness.   Abdominal: Soft. Normal appearance and bowel sounds are normal. He exhibits no distension, no abdominal bruit and no mass. There is no tenderness.   Musculoskeletal: Severe kyphosis.   Exhibits no  deformity 2+ lower extremity edema.  Lymphadenopathy:     He has no cervical adenopathy.   Neurological: He is alert and oriented to person, place, and time. No cranial nerve deficit. Coordination and gait normal.   Oriented to person, place and time.   Skin: Skin is warm, dry and intact. No rash noted. He is not diaphoretic. No cyanosis. Nails show no clubbing.   Psychiatric: He has a normal mood and affect. His speech is normal and behavior is normal. Judgment and thought content normal. Cognition and memory are normal.     Vitals:    05/02/19 1459   BP: 120/80   Pulse: 82   Weight: 68 kg (150 lb)   Height: 154.9 cm (61\")           Lab Review:       Assessment:          Diagnosis Plan   1. Essential hypertension  Basic Metabolic Panel   2. Mixed hyperlipidemia     3. PAF (paroxysmal atrial fibrillation) (CMS/HCC)     4. Abnormal stress test            Plan:       1.  Essential hypertension-well-controlled  2.  Hyperlipidemia- continue on lipid " lower therapy.  She is currently on simvastatin 40 mg  3.  PAF- rate controlled she is on beta-blocker and Xarelto.  She denies palpitations.  Atrial Fibrillation and Atrial Flutter  Assessment  • The patient has paroxysmal atrial fibrillation  • This is non-valvular in etiology  • The patient's CHADS2-VASc score is 6  • A [id number] score of 2 or more is considered a high risk for a thromboembolic event  • Rivaroxaban prescribed    Plan  • Attempt to maintain sinus rhythm  • Continue rivaroxaban for antithrombotic therapy, bleeding issues discussed  • Continue beta blocker for rhythm control  • Continue beta blocker for rate control    4.  Abnormal stress test- the myocardial perfusion imaging indicates a small to medium size, mildly severe area of ischemia located in the lateral wall.  Ejection fraction 57%.  She is tolerating the isosorbide 30 mg daily and not having any episodes of angina.  She does not wish to pursue cardiac catheterization at this point.    5.  No PERLA-test to 2014    6.  Mild bilateral carotid stenosis-stable    7.  renal insufficiency-followed by Dr. vaughn    8.  back pain-stable    9.  CVA due to PAF 5/2017- currently on Xarelto 20 mg daily    10.  Lower extremity edema- will increase Lasix to 40 mg twice daily.  Increase potassium to 20 mg daily.  BMP in 1 week.    RTO 2 weeks with FOZIA Zarate      Current Outpatient Medications:   •  amoxicillin-clavulanate (AUGMENTIN) 250-125 MG per tablet, Take 1 tablet by mouth 3 (Three) Times a Day., Disp: , Rfl:   •  B Complex Vitamins (VITAMIN B COMPLEX) tablet, Take 1 tablet by mouth Daily., Disp: , Rfl:   •  carvedilol (COREG) 3.125 MG tablet, Take 1 tablet by mouth 2 (Two) Times a Day With Meals., Disp: 180 tablet, Rfl: 3  •  Cholecalciferol (VITAMIN D PO), Take 1 tablet by mouth Daily., Disp: , Rfl:   •  famotidine (PEPCID) 40 MG tablet, Take 40 mg by mouth Daily., Disp: , Rfl:   •  furosemide (LASIX) 40 MG tablet, Take 1 tablet by  mouth 2 (Two) Times a Day. Take as needed, Disp: 180 tablet, Rfl: 3  •  levothyroxine (SYNTHROID, LEVOTHROID) 75 MCG tablet, Take 75 mcg by mouth Daily., Disp: , Rfl:   •  miconazole (MICOTIN) 2 % powder, Apply  topically., Disp: , Rfl:   •  potassium chloride (K-DUR) 10 MEQ CR tablet, Take 2 tablets by mouth Daily., Disp: 180 tablet, Rfl: 3  •  simvastatin (ZOCOR) 40 MG tablet, Every Night., Disp: , Rfl:   •  XARELTO 20 MG tablet, TAKE ONE TABLET BY MOUTH DAILY, Disp: 90 tablet, Rfl: 2  •  isosorbide mononitrate (IMDUR) 30 MG 24 hr tablet, Take 1 tablet by mouth Every Morning., Disp: 90 tablet, Rfl: 3

## 2019-05-02 ENCOUNTER — OFFICE VISIT (OUTPATIENT)
Dept: CARDIOLOGY | Facility: CLINIC | Age: 84
End: 2019-05-02

## 2019-05-02 VITALS
WEIGHT: 150 LBS | HEART RATE: 82 BPM | DIASTOLIC BLOOD PRESSURE: 80 MMHG | HEIGHT: 61 IN | SYSTOLIC BLOOD PRESSURE: 120 MMHG | BODY MASS INDEX: 28.32 KG/M2

## 2019-05-02 DIAGNOSIS — I10 ESSENTIAL HYPERTENSION: Primary | ICD-10-CM

## 2019-05-02 DIAGNOSIS — E78.2 MIXED HYPERLIPIDEMIA: ICD-10-CM

## 2019-05-02 DIAGNOSIS — R94.39 ABNORMAL STRESS TEST: ICD-10-CM

## 2019-05-02 DIAGNOSIS — I48.0 PAF (PAROXYSMAL ATRIAL FIBRILLATION) (HCC): ICD-10-CM

## 2019-05-02 PROCEDURE — 99214 OFFICE O/P EST MOD 30 MIN: CPT | Performed by: NURSE PRACTITIONER

## 2019-05-02 RX ORDER — ISOSORBIDE MONONITRATE 30 MG/1
30 TABLET, EXTENDED RELEASE ORAL EVERY MORNING
Qty: 90 TABLET | Refills: 3
Start: 2019-05-02 | End: 2020-02-17

## 2019-05-02 RX ORDER — POTASSIUM CHLORIDE 750 MG/1
20 TABLET, FILM COATED, EXTENDED RELEASE ORAL DAILY
Qty: 180 TABLET | Refills: 3 | Status: SHIPPED | OUTPATIENT
Start: 2019-05-02 | End: 2019-05-17

## 2019-05-02 RX ORDER — LEVOTHYROXINE SODIUM 0.07 MG/1
75 TABLET ORAL DAILY
COMMUNITY

## 2019-05-02 RX ORDER — AMOXICILLIN AND CLAVULANATE POTASSIUM 250; 125 MG/1; MG/1
1 TABLET, FILM COATED ORAL DAILY
COMMUNITY
End: 2022-12-07 | Stop reason: HOSPADM

## 2019-05-02 RX ORDER — FUROSEMIDE 40 MG/1
40 TABLET ORAL 2 TIMES DAILY
Qty: 180 TABLET | Refills: 3 | OUTPATIENT
Start: 2019-05-02 | End: 2020-04-23

## 2019-05-09 ENCOUNTER — LAB (OUTPATIENT)
Dept: LAB | Facility: HOSPITAL | Age: 84
End: 2019-05-09

## 2019-05-09 DIAGNOSIS — I10 ESSENTIAL HYPERTENSION: ICD-10-CM

## 2019-05-09 LAB
ANION GAP SERPL CALCULATED.3IONS-SCNC: 9.7 MMOL/L
BUN BLD-MCNC: 18 MG/DL (ref 8–23)
BUN/CREAT SERPL: 20.5 (ref 7–25)
CALCIUM SPEC-SCNC: 9 MG/DL (ref 8.2–9.6)
CHLORIDE SERPL-SCNC: 104 MMOL/L (ref 98–107)
CO2 SERPL-SCNC: 26.3 MMOL/L (ref 22–29)
CREAT BLD-MCNC: 0.88 MG/DL (ref 0.57–1)
GFR SERPL CREATININE-BSD FRML MDRD: 60 ML/MIN/1.73
GLUCOSE BLD-MCNC: 98 MG/DL (ref 65–99)
POTASSIUM BLD-SCNC: 4.3 MMOL/L (ref 3.5–5.2)
SODIUM BLD-SCNC: 140 MMOL/L (ref 136–145)

## 2019-05-09 PROCEDURE — 80048 BASIC METABOLIC PNL TOTAL CA: CPT

## 2019-05-09 PROCEDURE — 36415 COLL VENOUS BLD VENIPUNCTURE: CPT

## 2019-05-17 ENCOUNTER — OFFICE VISIT (OUTPATIENT)
Dept: CARDIOLOGY | Facility: CLINIC | Age: 84
End: 2019-05-17

## 2019-05-17 VITALS
SYSTOLIC BLOOD PRESSURE: 140 MMHG | HEIGHT: 61 IN | DIASTOLIC BLOOD PRESSURE: 70 MMHG | BODY MASS INDEX: 25.96 KG/M2 | HEART RATE: 75 BPM | WEIGHT: 137.5 LBS

## 2019-05-17 DIAGNOSIS — I48.0 PAF (PAROXYSMAL ATRIAL FIBRILLATION) (HCC): Primary | ICD-10-CM

## 2019-05-17 DIAGNOSIS — E78.2 MIXED HYPERLIPIDEMIA: ICD-10-CM

## 2019-05-17 DIAGNOSIS — I63.411 CEREBROVASCULAR ACCIDENT (CVA) DUE TO EMBOLISM OF RIGHT MIDDLE CEREBRAL ARTERY (HCC): ICD-10-CM

## 2019-05-17 DIAGNOSIS — I10 ESSENTIAL HYPERTENSION: ICD-10-CM

## 2019-05-17 PROCEDURE — 93000 ELECTROCARDIOGRAM COMPLETE: CPT | Performed by: INTERNAL MEDICINE

## 2019-05-17 PROCEDURE — 99214 OFFICE O/P EST MOD 30 MIN: CPT | Performed by: INTERNAL MEDICINE

## 2019-05-17 RX ORDER — SULFAMETHOXAZOLE AND TRIMETHOPRIM 400; 80 MG/1; MG/1
TABLET ORAL
COMMUNITY
Start: 2019-05-13 | End: 2019-08-19

## 2019-05-17 RX ORDER — SPIRONOLACTONE 25 MG/1
25 TABLET ORAL DAILY
Qty: 90 TABLET | Refills: 3 | Status: SHIPPED | OUTPATIENT
Start: 2019-05-17 | End: 2019-08-19

## 2019-05-17 NOTE — PROGRESS NOTES
Date of Office Visit: 2019  Encounter Provider: Cuca Grier MD  Place of Service: [facility] CARDIOLOGY  Patient Name: Doris Coreas  :[id number]      Patient ID:  Doris Coreas is a 89 y.o. female is here for  followup for hypertension and hyperlipidemia.         History of Present Illness        She has hypertension and hyperlipidemia. She was on Losartan but apparently developed a rash with the Losartan and had to stop this.She was placed on amlodipine at 10 mg daily but then had a lot of ankle edema.      In 10/2010 she did have a stress nuclear perfusion study which was negative for any evidence of ischemia. This was performed at [facility].      She was admitted May 2017 with visual changes and left hand weakness.  She was found to have stroke and the MRI of her brain showed multiple strokes in the right middle cerebral artery territory.  The MRA showed no evidence of carotid stenosis.  She's no longer able to drive because of visual changes.  She was placed on Plavix at that time and no ADDI was done.  She did have lower extremity venous duplex studies done May 2017 showing no DVT.  She had an echocardiogram done May 2017 which showed normal ejection fraction 61%, normal saline contrast study and no significant valve disease.  She then wore Ziopatch which showed multiple bursts of paroxysmal atrial fibrillation.  This is likely the source of her stroke.  Her TSH was elevated at 7.7 during that admission.  Her Synthroid dosing was changed then. She was placed on xarelto.     Labs done 3/13/2019 show normal CMP, LDL 76, HDL 58, TSH 1.24.  She has exertional dyspnea but if she slows down, she feels better.  She has LE edema.  She has somewhat poor energy level.  She did have another basic metabolic panel done 2019 which was normal.  Because of her dyspnea, she did have an echocardiogram done 2019 showing ejection fraction 57% with grade 1A  diastolic dysfunction and aortic valve calcification without stenosis.  His stress nuclear perfusion study in 2/1/2019 showing a small to medium sized area of mild ischemia in the lateral wall.  This is been treated medically.  She said no dizziness or falls.  She does not feel her heart racing or skipping.      Past Medical History:   Diagnosis Date   • Carotid artery stenosis    • Chronic renal insufficiency    • Compression fracture of lumbar vertebra (CMS/HCC)    • Difficulty breathing    • Fatigue    • H/O echocardiogram 05/20/2015   • Health care maintenance    • History of EKG 09/25/2015   • Hyperlipidemia    • Hypertension    • Hypothyroidism    • Stroke (CMS/HCC)          Past Surgical History:   Procedure Laterality Date   • CATARACT EXTRACTION     • CHOLECYSTECTOMY     • HERNIA REPAIR         Current Outpatient Medications on File Prior to Visit   Medication Sig Dispense Refill   • amoxicillin-clavulanate (AUGMENTIN) 250-125 MG per tablet Take 1 tablet by mouth 3 (Three) Times a Day.     • B Complex Vitamins (VITAMIN B COMPLEX) tablet Take 1 tablet by mouth Daily.     • carvedilol (COREG) 3.125 MG tablet Take 1 tablet by mouth 2 (Two) Times a Day With Meals. 180 tablet 3   • Cholecalciferol (VITAMIN D PO) Take 1 tablet by mouth Daily.     • famotidine (PEPCID) 40 MG tablet Take 40 mg by mouth Daily.     • furosemide (LASIX) 40 MG tablet Take 1 tablet by mouth 2 (Two) Times a Day. Take as needed 180 tablet 3   • isosorbide mononitrate (IMDUR) 30 MG 24 hr tablet Take 1 tablet by mouth Every Morning. 90 tablet 3   • levothyroxine (SYNTHROID, LEVOTHROID) 75 MCG tablet Take 75 mcg by mouth Daily.     • miconazole (MICOTIN) 2 % powder Apply  topically.     • potassium chloride (K-DUR) 10 MEQ CR tablet Take 2 tablets by mouth Daily. (Patient taking differently: Take 10 mEq by mouth Daily.) 180 tablet 3   • simvastatin (ZOCOR) 40 MG tablet Every Night.     • sulfamethoxazole-trimethoprim (BACTRIM,SEPTRA) 400-80  "MG tablet      • XARELTO 20 MG tablet TAKE ONE TABLET BY MOUTH DAILY 90 tablet 2     No current facility-administered medications on file prior to visit.        Social History     Socioeconomic History   • Marital status:      Spouse name: Not on file   • Number of children: Not on file   • Years of education: Not on file   • Highest education level: Not on file   Tobacco Use   • Smoking status: Never Smoker   • Smokeless tobacco: Never Used   • Tobacco comment: caffeine use: 2 cups daily.    Substance and Sexual Activity   • Alcohol use: No   • Drug use: No   • Sexual activity: Defer           Review of Systems   Constitution: Negative.   HENT: Negative for congestion.    Eyes: Negative for vision loss in left eye and vision loss in right eye.   Respiratory: Negative.  Negative for cough, hemoptysis, shortness of breath, sleep disturbances due to breathing, snoring, sputum production and wheezing.    Endocrine: Negative.    Hematologic/Lymphatic: Negative.    Skin: Negative for poor wound healing and rash.   Musculoskeletal: Negative for falls, gout, muscle cramps and myalgias.   Gastrointestinal: Negative for abdominal pain, diarrhea, dysphagia, hematemesis, melena, nausea and vomiting.   Neurological: Negative for excessive daytime sleepiness, dizziness, headaches, light-headedness, loss of balance, seizures and vertigo.   Psychiatric/Behavioral: Negative for depression and substance abuse. The patient is not nervous/anxious.        Procedures    ECG 12 Lead  Date/Time: 5/17/2019 2:38 PM  Performed by: Cuca Grier MD  Authorized by: Cuca Grier MD   Comparison: compared with previous ECG   Similar to previous ECG  Rhythm: sinus rhythm    Clinical impression: normal ECG                Objective:      Vitals:    05/17/19 1421   BP: 140/70   BP Location: Right arm   Patient Position: Sitting   Weight: 62.4 kg (137 lb 8 oz)   Height: 154.9 cm (60.98\")     Body mass index is 25.99 " kg/m².    Physical Exam   Constitutional: She is oriented to person, place, and time. She appears well-developed and well-nourished. No distress.   HENT:   Head: Normocephalic and atraumatic.   Eyes: Conjunctivae are normal. No scleral icterus.   Neck: Neck supple. No JVD present. Carotid bruit is not present. No thyromegaly present.   Cardiovascular: Normal rate, regular rhythm, S1 normal, S2 normal and intact distal pulses.  No extrasystoles are present. PMI is not displaced. Exam reveals no gallop.   Murmur heard.   Midsystolic murmur is present with a grade of 3/6 at the upper right sternal border and upper left sternal border. 2+ LE edema  Pulses:       Carotid pulses are 2+ on the right side with bruit, and 2+ on the left side with bruit.       Radial pulses are 2+ on the right side, and 2+ on the left side.        Dorsalis pedis pulses are 2+ on the right side, and 2+ on the left side.        Posterior tibial pulses are 2+ on the right side, and 2+ on the left side.   Pulmonary/Chest: Effort normal and breath sounds normal. No respiratory distress. She has no wheezes. She has no rhonchi. She has no rales. She exhibits no tenderness.   Abdominal: Soft. Bowel sounds are normal. She exhibits no distension, no abdominal bruit and no mass. There is no tenderness.   Musculoskeletal: She exhibits no edema or deformity.   Lymphadenopathy:     She has no cervical adenopathy.   Neurological: She is alert and oriented to person, place, and time. No cranial nerve deficit.   Skin: Skin is warm and dry. No rash noted. She is not diaphoretic. No cyanosis. No pallor. Nails show no clubbing.   Psychiatric: She has a normal mood and affect. Judgment normal.   Vitals reviewed.      Lab Review:       Assessment:      Diagnosis Plan   1. PAF (paroxysmal atrial fibrillation) (CMS/HCC)     2. Cerebrovascular accident (CVA) due to embolism of right middle cerebral artery (CMS/HCC)     3. Mixed hyperlipidemia     4. Essential  hypertension       1. Hypertension; well controlled.   2. No PERLA, tested 2014.  3. Mild bilateral carotid stenosis. Stable.   4. Hyperlipidemia. On simvastatin. .  5. Back pain, stable.   6. CVA due to PAF 5/2017.  on xarelto 20mg daily with food.   7. Dyspnea on exertion with LE edema.  This is likely due to deconditioning, diastolic dysfunction and may be coronary ischemia.  Would continue Imdur.     Plan:       See amadeo in 3 months.  Stop kcl and start spironolactone 25mg daily.  She wants to treat her abnormal stress study medically.  She does not want a cardiac catheterization.  Her daughter is with her today and is in agreement.  We will continue to adjust medications.

## 2019-08-19 ENCOUNTER — OFFICE VISIT (OUTPATIENT)
Dept: CARDIOLOGY | Facility: CLINIC | Age: 84
End: 2019-08-19

## 2019-08-19 VITALS
BODY MASS INDEX: 27.94 KG/M2 | SYSTOLIC BLOOD PRESSURE: 130 MMHG | DIASTOLIC BLOOD PRESSURE: 64 MMHG | HEART RATE: 80 BPM | WEIGHT: 151.8 LBS | HEIGHT: 62 IN

## 2019-08-19 DIAGNOSIS — I48.0 PAF (PAROXYSMAL ATRIAL FIBRILLATION) (HCC): ICD-10-CM

## 2019-08-19 DIAGNOSIS — I63.411 CEREBROVASCULAR ACCIDENT (CVA) DUE TO EMBOLISM OF RIGHT MIDDLE CEREBRAL ARTERY (HCC): ICD-10-CM

## 2019-08-19 DIAGNOSIS — I10 ESSENTIAL HYPERTENSION: Primary | ICD-10-CM

## 2019-08-19 DIAGNOSIS — E78.2 MIXED HYPERLIPIDEMIA: ICD-10-CM

## 2019-08-19 PROCEDURE — 99213 OFFICE O/P EST LOW 20 MIN: CPT | Performed by: NURSE PRACTITIONER

## 2019-08-19 PROCEDURE — 93000 ELECTROCARDIOGRAM COMPLETE: CPT | Performed by: NURSE PRACTITIONER

## 2019-08-19 RX ORDER — FERROUS SULFATE 325(65) MG
325 TABLET ORAL
COMMUNITY
Start: 2019-06-14 | End: 2021-12-14

## 2019-08-19 RX ORDER — SPIRONOLACTONE 25 MG/1
25 TABLET ORAL DAILY
Qty: 90 TABLET | Refills: 3 | Status: SHIPPED | OUTPATIENT
Start: 2019-08-19 | End: 2020-08-27

## 2019-08-19 RX ORDER — AMLODIPINE BESYLATE 5 MG/1
5 TABLET ORAL DAILY
COMMUNITY
Start: 2019-06-15 | End: 2019-08-19 | Stop reason: ALTCHOICE

## 2019-08-19 NOTE — PROGRESS NOTES
Subjective:     Encounter Date:08/19/2019      Patient ID: Doris Coreas is a 89 y.o. female.    Chief Complaint: 3 month follow up  History of Present Illness      She has hypertension and hyperlipidemia. She was on Losartan but apparently developed a rash with the Losartan and had to stop this.She was placed on amlodipine at 10 mg daily but then had a lot of ankle edema.       In 10/2010 she did have a stress nuclear perfusion study which was negative for any evidence of ischemia. This was performed at [facility].      She was admitted May 2017 with visual changes and left hand weakness.  She was found to have stroke and the MRI of her brain showed multiple strokes in the right middle cerebral artery territory.  The MRA showed no evidence of carotid stenosis.  She's no longer able to drive because of visual changes.  She was placed on Plavix at that time and no ADDI was done.  She did have lower extremity venous duplex studies done May 2017 showing no DVT.  She had an echocardiogram done May 2017 which showed normal ejection fraction 61%, normal saline contrast study and no significant valve disease.  She then wore Ziopatch which showed multiple bursts of paroxysmal atrial fibrillation.  This is likely the source of her stroke.  Her TSH was elevated at 7.7 during that admission.  Her Synthroid dosing was changed then. She was placed on xarelto.      Labs done 3/13/2019 show normal CMP, LDL 76, HDL 58, TSH 1.24.  She has exertional dyspnea but if she slows down, she feels better.  She has LE edema.  She has somewhat poor energy level.  She did have another basic metabolic panel done 5/9/2019 which was normal.  Because of her dyspnea, she did have an echocardiogram done 2/13/2019 showing ejection fraction 57% with grade 1A diastolic dysfunction and aortic valve calcification without stenosis.  His stress nuclear perfusion study in 2/1/2019 showing a small to medium sized area of mild ischemia in the  lateral wall.  This is been treated medically.     She was instructed to go to the emergency department after having labs drawn at her primary care's office on 6/13/2019.  Her hemoglobin was 6.7- hematocrit 23.6.  She had been complaining of fatigue.  She denied any melena, hematemesis or hematemesis.  She had no abnormal uterine bleeding.  She has a history of B12 deficiency but was noncompliant with her supplement.  Since her anemia was microcytic, iron indices were checked and she was found to be severely iron deficient.  She was given 2 doses of Ferrlecit and received 2 units of packed red blood cells.  Stool for Hemoccult was negative.  She felt much better and at discharge her hemoglobin was up to 8.3.  Was started on iron supplementation.  They also adjusted her Xarelto down to 15 mg daily.    She presents today, 8/19/2019, for 3-month follow-up.  She denies any palpitations, chest pain or chest tightness.  She is had no episode of lightheadedness or dizziness.  She is occasionally short of breath but feels better if she slows down.  She does complain of fatigue still but better since receiving the blood transfusions..  She has bilateral lower extremity edema.   She states this is unchanged.  It is noted that she is up 14 pounds since May 2019.  At her visit in May with Dr. Grier she was started on Spironolactone 25 mg p.o. daily.  She did not understand the directions and is only been taking it as needed.  She continues to be very active and is out and about with her daughter and family most days.  She is following with Dr. Smith for her iron deficiency anemia.    The following portions of the patient's history were reviewed and updated as appropriate: allergies, current medications, past family history, past medical history, past social history, past surgical history and problem list.    Review of Systems   Constitution: Negative for weakness and malaise/fatigue.   HENT: Negative for congestion,  hoarse voice and sore throat.    Eyes: Negative for blurred vision, double vision, photophobia, vision loss in left eye and vision loss in right eye.   Cardiovascular: Negative for chest pain, dyspnea on exertion, irregular heartbeat, leg swelling, near-syncope, orthopnea, palpitations, paroxysmal nocturnal dyspnea and syncope.   Respiratory: Negative for cough, hemoptysis, shortness of breath, sleep disturbances due to breathing, snoring, sputum production and wheezing.    Endocrine: Negative.    Hematologic/Lymphatic: Does not bruise/bleed easily.   Skin: Negative for color change, dry skin, poor wound healing and rash.   Musculoskeletal: Negative for back pain, falls, gout, joint pain, joint swelling, muscle cramps and muscle weakness.   Gastrointestinal: Negative for abdominal pain, constipation, diarrhea, dysphagia, melena, nausea and vomiting.   Neurological: Negative for excessive daytime sleepiness, dizziness, headaches, light-headedness, loss of balance, numbness, paresthesias, seizures and vertigo.   Psychiatric/Behavioral: Negative for depression and substance abuse. The patient is not nervous/anxious.        ECG 12 Lead  Date/Time: 8/19/2019 2:47 PM  Performed by: Kim Christianson APRN  Authorized by: Kim Christianson APRN   Comparison: compared with previous ECG from 5/17/2019  Similar to previous ECG  Rhythm: sinus rhythm  Rate: normal  Conduction: conduction normal  ST Segments: ST segments normal  T Waves: T waves normal  QRS axis: indeterminate    Clinical impression: normal ECG               Objective:         Physical Exam   Constitutional: He is oriented to person, place, and time. Vital signs are normal. He appears well-developed and well-nourished. No distress.   HENT:   Head: Normocephalic and atraumatic.   Right Ear: Hearing normal.   Left Ear: Hearing normal.   Eyes: Conjunctivae and lids are normal.   Neck: Normal range of motion. Neck supple. No JVD present.  Left Carotid bruit is  present. No  "thyromegaly present.   Cardiovascular: Normal rate, regular rhythm, S1 normal, S2 normal, normal heart sounds and intact distal pulses.  PMI is not displaced.  Exam reveals no gallop.    Murmur 3/6 right upper sternal border, upper left sternal border  Pulses:       Carotid pulses are 2+ on the right side, and 2+ on the left side.       Radial pulses are 2+ on the right side, and 2+ on the left side.        Dorsalis pedis pulses are 2+ on the right side, and 2+ on the left side.        Posterior tibial pulses are 2+ on the right side, and 2+ on the left side.   Pulmonary/Chest: Effort normal and breath sounds normal. No respiratory distress. He has no wheezes. He has no rhonchi. He has no rales. He exhibits no tenderness.   Abdominal: Soft. Normal appearance and bowel sounds are normal. He exhibits no distension, no abdominal bruit and no mass. There is no tenderness.   Musculoskeletal: Normal range of motion.   Exhibits no  deformity 2+ bilateral pedal edema  Lymphadenopathy:     He has no cervical adenopathy.   Neurological: He is alert and oriented to person, place, and time. No cranial nerve deficit. Coordination and gait normal.   Oriented to person, place and time.   Skin: Skin is warm, dry and intact. No rash noted. He is not diaphoretic. No cyanosis. Nails show no clubbing.   Psychiatric: He has a normal mood and affect. His speech is normal and behavior is normal. Judgment and thought content normal. Cognition and memory are normal.     Vitals:    08/19/19 1417 08/19/19 1422   BP:  130/64   Pulse: 80    Weight: 68.9 kg (151 lb 12.8 oz)    Height: 157.5 cm (62\")            Lab Review:       Assessment:          Diagnosis Plan   1. Essential hypertension     2. Mixed hyperlipidemia     3. Cerebrovascular accident (CVA) due to embolism of right middle cerebral artery (CMS/HCC)     4. PAF (paroxysmal atrial fibrillation) (CMS/HCC)            Plan:       1.  HTN - controlled, start taking spironolactone 25 mg " daily  2.  Hyperlipidemia - cotnine lipid lowering therapy.  Currently on simvastatin    3.  CVA due to PAF 5/17.  Continue on Xarelto 15 mg daily    4.  PAF - NSR, rate controlled, on BB and Xarelto    Atrial Fibrillation and Atrial Flutter  Assessment  • The patient has paroxysmal atrial fibrillation  • This is non-valvular in etiology  • The patient's CHADS2-VASc score is 7  • A [id number] score of 2 or more is considered a high risk for a thromboembolic event  • Rivaroxaban prescribed    Plan  • Attempt to maintain sinus rhythm  • Continue rivaroxaban for antithrombotic therapy, bleeding issues discussed  • Continue beta blocker for rhythm control  • Continue beta blocker for rate control    5.  Mild bilateral carotid stenosis - stable    6.  Chronic back pain - stable    7.  Dyspnea - better, likely due to deconditioning, diastolic dysfunctioning and may be coronary ischemia, continue imdur.    8.  Abnormal stress test - 2/14/19 - Left ventricular ejection fraction is normal (Calculated EF = 70%). Myocardial perfusion imaging indicates a small-to-medium-sized, mildly severe area of ischemia located in the lateral wall. She does not want a cardiac catheterization.  Will treat medically.    RTO 4 months with RM    .FOZIA Jones      Current Outpatient Medications:   •  amoxicillin-clavulanate (AUGMENTIN) 250-125 MG per tablet, Take 1 tablet by mouth Daily., Disp: , Rfl:   •  B Complex Vitamins (VITAMIN B COMPLEX) tablet, Take 1 tablet by mouth Daily., Disp: , Rfl:   •  carvedilol (COREG) 3.125 MG tablet, Take 1 tablet by mouth 2 (Two) Times a Day With Meals., Disp: 180 tablet, Rfl: 3  •  famotidine (PEPCID) 40 MG tablet, Take 40 mg by mouth Daily., Disp: , Rfl:   •  ferrous sulfate 325 (65 FE) MG tablet, Take 325 mg by mouth., Disp: , Rfl:   •  furosemide (LASIX) 40 MG tablet, Take 1 tablet by mouth 2 (Two) Times a Day. Take as needed, Disp: 180 tablet, Rfl: 3  •  isosorbide mononitrate (IMDUR) 30 MG 24 hr  tablet, Take 1 tablet by mouth Every Morning., Disp: 90 tablet, Rfl: 3  •  levothyroxine (SYNTHROID, LEVOTHROID) 75 MCG tablet, Take 75 mcg by mouth Daily., Disp: , Rfl:   •  miconazole (MICOTIN) 2 % powder, Apply  topically to the appropriate area as directed As Needed., Disp: , Rfl:   •  rivaroxaban (XARELTO) 15 MG tablet, Take 15 mg by mouth Daily., Disp: , Rfl:   •  simvastatin (ZOCOR) 40 MG tablet, Every Night., Disp: , Rfl:   •  spironolactone (ALDACTONE) 25 MG tablet, Take 1 tablet by mouth Daily., Disp: 90 tablet, Rfl: 3  •  Cholecalciferol (VITAMIN D PO), Take 1 tablet by mouth Daily., Disp: , Rfl:

## 2020-02-17 RX ORDER — ISOSORBIDE MONONITRATE 30 MG/1
TABLET, EXTENDED RELEASE ORAL
Qty: 90 TABLET | Refills: 2 | Status: SHIPPED | OUTPATIENT
Start: 2020-02-17 | End: 2020-04-28

## 2020-04-23 ENCOUNTER — HOSPITAL ENCOUNTER (EMERGENCY)
Facility: HOSPITAL | Age: 85
Discharge: HOME | End: 2020-04-23
Attending: EMERGENCY MEDICINE | Admitting: EMERGENCY MEDICINE

## 2020-04-23 ENCOUNTER — APPOINTMENT (OUTPATIENT)
Dept: GENERAL RADIOLOGY | Facility: HOSPITAL | Age: 85
End: 2020-04-23

## 2020-04-23 VITALS
BODY MASS INDEX: 30.32 KG/M2 | DIASTOLIC BLOOD PRESSURE: 67 MMHG | HEART RATE: 75 BPM | RESPIRATION RATE: 16 BRPM | SYSTOLIC BLOOD PRESSURE: 158 MMHG | OXYGEN SATURATION: 98 % | WEIGHT: 182 LBS | HEIGHT: 65 IN | TEMPERATURE: 97.3 F

## 2020-04-23 DIAGNOSIS — R55 SYNCOPE AND COLLAPSE: Primary | ICD-10-CM

## 2020-04-23 LAB
ALBUMIN SERPL-MCNC: 3.9 G/DL (ref 3.5–5.2)
ALBUMIN/GLOB SERPL: 1 G/DL
ALP SERPL-CCNC: 79 U/L (ref 39–117)
ALT SERPL W P-5'-P-CCNC: 17 U/L (ref 1–33)
ANION GAP SERPL CALCULATED.3IONS-SCNC: 15.5 MMOL/L (ref 5–15)
APTT PPP: 28 SECONDS (ref 24.3–38.1)
AST SERPL-CCNC: 31 U/L (ref 1–32)
BACTERIA UR QL AUTO: ABNORMAL /HPF
BASOPHILS # BLD AUTO: 0.04 10*3/MM3 (ref 0–0.2)
BASOPHILS NFR BLD AUTO: 0.5 % (ref 0–1.5)
BILIRUB SERPL-MCNC: 0.3 MG/DL (ref 0.2–1.2)
BILIRUB UR QL STRIP: NEGATIVE
BUN BLD-MCNC: 22 MG/DL (ref 8–23)
BUN/CREAT SERPL: 20.8 (ref 7–25)
CALCIUM SPEC-SCNC: 9.9 MG/DL (ref 8.2–9.6)
CHLORIDE SERPL-SCNC: 94 MMOL/L (ref 98–107)
CK SERPL-CCNC: 55 U/L (ref 20–180)
CLARITY UR: CLEAR
CO2 SERPL-SCNC: 25.5 MMOL/L (ref 22–29)
COLOR UR: YELLOW
CREAT BLD-MCNC: 1.06 MG/DL (ref 0.57–1)
DEPRECATED RDW RBC AUTO: 43.9 FL (ref 37–54)
EOSINOPHIL # BLD AUTO: 0.35 10*3/MM3 (ref 0–0.4)
EOSINOPHIL NFR BLD AUTO: 4.4 % (ref 0.3–6.2)
ERYTHROCYTE [DISTWIDTH] IN BLOOD BY AUTOMATED COUNT: 12.9 % (ref 12.3–15.4)
GFR SERPL CREATININE-BSD FRML MDRD: 49 ML/MIN/1.73
GLOBULIN UR ELPH-MCNC: 4 GM/DL
GLUCOSE BLD-MCNC: 128 MG/DL (ref 65–99)
GLUCOSE UR STRIP-MCNC: NEGATIVE MG/DL
HCT VFR BLD AUTO: 38.6 % (ref 34–46.6)
HGB BLD-MCNC: 12.6 G/DL (ref 12–15.9)
HGB UR QL STRIP.AUTO: ABNORMAL
HYALINE CASTS UR QL AUTO: ABNORMAL /LPF
IMM GRANULOCYTES # BLD AUTO: 0.06 10*3/MM3 (ref 0–0.05)
IMM GRANULOCYTES NFR BLD AUTO: 0.7 % (ref 0–0.5)
INR PPP: 1.06 (ref 0.9–1.1)
KETONES UR QL STRIP: NEGATIVE
LEUKOCYTE ESTERASE UR QL STRIP.AUTO: ABNORMAL
LIPASE SERPL-CCNC: 27 U/L (ref 13–60)
LYMPHOCYTES # BLD AUTO: 1.59 10*3/MM3 (ref 0.7–3.1)
LYMPHOCYTES NFR BLD AUTO: 19.9 % (ref 19.6–45.3)
MCH RBC QN AUTO: 30.3 PG (ref 26.6–33)
MCHC RBC AUTO-ENTMCNC: 32.6 G/DL (ref 31.5–35.7)
MCV RBC AUTO: 92.8 FL (ref 79–97)
MONOCYTES # BLD AUTO: 0.58 10*3/MM3 (ref 0.1–0.9)
MONOCYTES NFR BLD AUTO: 7.2 % (ref 5–12)
NEUTROPHILS # BLD AUTO: 5.39 10*3/MM3 (ref 1.7–7)
NEUTROPHILS NFR BLD AUTO: 67.3 % (ref 42.7–76)
NITRITE UR QL STRIP: NEGATIVE
NRBC BLD AUTO-RTO: 0 /100 WBC (ref 0–0.2)
PH UR STRIP.AUTO: 5.5 [PH] (ref 4.5–8)
PLATELET # BLD AUTO: 309 10*3/MM3 (ref 140–450)
PMV BLD AUTO: 9.6 FL (ref 6–12)
POTASSIUM BLD-SCNC: 3.6 MMOL/L (ref 3.5–5.2)
PROT SERPL-MCNC: 7.9 G/DL (ref 6–8.5)
PROT UR QL STRIP: NEGATIVE
PROTHROMBIN TIME: 13.5 SECONDS (ref 12.1–15)
RBC # BLD AUTO: 4.16 10*6/MM3 (ref 3.77–5.28)
RBC # UR: ABNORMAL /HPF
REF LAB TEST METHOD: ABNORMAL
SODIUM BLD-SCNC: 135 MMOL/L (ref 136–145)
SP GR UR STRIP: 1.03 (ref 1–1.03)
SQUAMOUS #/AREA URNS HPF: ABNORMAL /HPF
TROPONIN T SERPL-MCNC: <0.01 NG/ML (ref 0–0.03)
TROPONIN T SERPL-MCNC: <0.01 NG/ML (ref 0–0.03)
UROBILINOGEN UR QL STRIP: ABNORMAL
WBC NRBC COR # BLD: 8.01 10*3/MM3 (ref 3.4–10.8)
WBC UR QL AUTO: ABNORMAL /HPF

## 2020-04-23 PROCEDURE — 80053 COMPREHEN METABOLIC PANEL: CPT | Performed by: EMERGENCY MEDICINE

## 2020-04-23 PROCEDURE — 85610 PROTHROMBIN TIME: CPT | Performed by: EMERGENCY MEDICINE

## 2020-04-23 PROCEDURE — 93005 ELECTROCARDIOGRAM TRACING: CPT | Performed by: EMERGENCY MEDICINE

## 2020-04-23 PROCEDURE — 81001 URINALYSIS AUTO W/SCOPE: CPT | Performed by: EMERGENCY MEDICINE

## 2020-04-23 PROCEDURE — 99284 EMERGENCY DEPT VISIT MOD MDM: CPT

## 2020-04-23 PROCEDURE — 71045 X-RAY EXAM CHEST 1 VIEW: CPT

## 2020-04-23 PROCEDURE — 85730 THROMBOPLASTIN TIME PARTIAL: CPT | Performed by: EMERGENCY MEDICINE

## 2020-04-23 PROCEDURE — 93010 ELECTROCARDIOGRAM REPORT: CPT | Performed by: INTERNAL MEDICINE

## 2020-04-23 PROCEDURE — 85025 COMPLETE CBC W/AUTO DIFF WBC: CPT | Performed by: EMERGENCY MEDICINE

## 2020-04-23 PROCEDURE — 82550 ASSAY OF CK (CPK): CPT | Performed by: EMERGENCY MEDICINE

## 2020-04-23 PROCEDURE — 84484 ASSAY OF TROPONIN QUANT: CPT | Performed by: EMERGENCY MEDICINE

## 2020-04-23 PROCEDURE — 99284 EMERGENCY DEPT VISIT MOD MDM: CPT | Performed by: EMERGENCY MEDICINE

## 2020-04-23 PROCEDURE — 83690 ASSAY OF LIPASE: CPT | Performed by: EMERGENCY MEDICINE

## 2020-04-23 RX ORDER — SODIUM CHLORIDE 9 MG/ML
INJECTION, SOLUTION INTRAVENOUS
Status: COMPLETED
Start: 2020-04-23 | End: 2020-04-23

## 2020-04-23 RX ORDER — SODIUM CHLORIDE 0.9 % (FLUSH) 0.9 %
10 SYRINGE (ML) INJECTION AS NEEDED
Status: DISCONTINUED | OUTPATIENT
Start: 2020-04-23 | End: 2020-04-23 | Stop reason: HOSPADM

## 2020-04-23 RX ADMIN — SODIUM CHLORIDE 500 ML: 9 INJECTION, SOLUTION INTRAVENOUS at 16:45

## 2020-04-23 NOTE — ED NOTES
"Verified medications with patient's daughter Jaimee. Daughter reports pt has been on augmentin \"for several months for recurring uti's\". Verified medication list with daughter.      Medina Burton RN  04/23/20 2136    "

## 2020-04-23 NOTE — ED PROVIDER NOTES
EMERGENCY DEPARTMENT ENCOUNTER      Room Number: D/D      HPI:    Chief complaint: Syncope    Location: Event occurred at home    Quality/Severity: Moderate    Timing/Duration: Episode occurred shortly prior to arrival and the patient thinks that she was unconscious for no more than a few minutes.    Modifying Factors: None    Associated Symptoms: Nausea and lightheadedness    Narrative: Pt is a 89 y.o. female who presents complaining of syncope as noted above.  Patient relates for the past few days she has been having some pedal edema for which she has been taking Lasix.  Today the patient began to have some lightheadedness and nausea.  While the patient was sitting on the toilet she felt lightheaded and proceeded to pass out.  After this event the patient was wedged between the toilet and the wall and could not extricate herself.  The patient's daughter could not contact the patient by phone and became concerned.  Daughter found the patient wedged as noted above.  Patient states that her right arm did get numb but denies specific pain.  The syncope was not associated with palpitations, chest pain or shortness of breath.  No history of syncope.  Patient is followed by cardiology for a history of paroxysmal atrial fibrillation, hypertension and pedal edema.      PMD: Leeann Smith MD    REVIEW OF SYSTEMS  Review of Systems   Constitutional: Negative for activity change, appetite change, fatigue and fever.   HENT: Negative for congestion.    Respiratory: Negative for cough, shortness of breath and wheezing.    Cardiovascular: Negative for chest pain, palpitations and leg swelling.   Gastrointestinal: Positive for nausea. Negative for abdominal pain, diarrhea and vomiting.   Endocrine: Negative for polydipsia.   Genitourinary: Negative for difficulty urinating, dysuria, flank pain, frequency and urgency.   Musculoskeletal: Positive for back pain (Chronic).   Skin: Negative for rash.   Neurological: Positive for  dizziness, syncope (Today), weakness (Generalized and age-related) and light-headedness. Negative for headaches.   Psychiatric/Behavioral: Negative for confusion, decreased concentration and sleep disturbance.   All other systems reviewed and are negative.      PAST MEDICAL HISTORY  Active Ambulatory Problems     Diagnosis Date Noted   • Essential hypertension 11/07/2016   • HLD (hyperlipidemia) 11/07/2016   • Stroke (CMS/[facility]) 05/01/2017   • PAF (paroxysmal atrial fibrillation) (CMS/[facility]) 11/05/2018   • Abnormal stress test 05/02/2019     Resolved Ambulatory Problems     Diagnosis Date Noted   • No Resolved Ambulatory Problems     Past Medical History:   Diagnosis Date   • Carotid artery stenosis    • Chronic renal insufficiency    • Compression fracture of lumbar vertebra (CMS/[facility])    • Difficulty breathing    • Fatigue    • H/O echocardiogram 05/20/2015   • Health care maintenance    • History of EKG 09/25/2015   • Hyperlipidemia    • Hypertension    • Hypothyroidism    • Transfusion history 07/2019       PAST SURGICAL HISTORY  Past Surgical History:   Procedure Laterality Date   • CATARACT EXTRACTION     • CHOLECYSTECTOMY     • HERNIA REPAIR         FAMILY HISTORY  Family History   Problem Relation Age of Onset   • Heart failure Mother        SOCIAL HISTORY  Social History     Socioeconomic History   • Marital status:      Spouse name: Not on file   • Number of children: Not on file   • Years of education: Not on file   • Highest education level: Not on file   Tobacco Use   • Smoking status: Never Smoker   • Smokeless tobacco: Never Used   • Tobacco comment: caffeine use: 2 cups daily.    Substance and Sexual Activity   • Alcohol use: No   • Drug use: No   • Sexual activity: Defer       ALLERGIES  Azithromycin; Levofloxacin; Losartan; Losartan potassium; and Macrodantin [nitrofurantoin]    PHYSICAL EXAM  ED Triage Vitals   Temp Heart Rate Resp BP SpO2   04/23/20 1606 04/23/20 1606 04/23/20 1606 04/23/20  1608 04/23/20 1606   97.3 °F (36.3 °C) 69 16 143/73 96 %      Temp src Heart Rate Source Patient Position BP Location FiO2 (%)   04/23/20 1606 04/23/20 1606 04/23/20 1608 04/23/20 1608 --   Oral Monitor Sitting Right arm        Physical Exam   Constitutional: She is oriented to person, place, and time.   The patient is a frail and elderly, white female in no acute distress.   HENT:   Head: Normocephalic and atraumatic.   Eyes: Pupils are equal, round, and reactive to light. Conjunctivae and EOM are normal.   Neck: Normal range of motion. Neck supple.   Cardiovascular: Normal rate and regular rhythm.   2/6 systolic ejection murmur best heard over the right upper sternal border.   Pulmonary/Chest: Effort normal and breath sounds normal. No respiratory distress.   Abdominal: Soft. Bowel sounds are normal. There is no tenderness.   Musculoskeletal: Normal range of motion. She exhibits edema (Trace pedal edema bilaterally).   Neurological: She is oriented to person, place, and time. No cranial nerve deficit.   Skin: Skin is warm and dry.   Psychiatric: Memory, affect and judgment normal.       LAB RESULTS  Results for orders placed or performed during the hospital encounter of 04/23/20   Comprehensive Metabolic Panel   Result Value Ref Range    Glucose 128 (H) 65 - 99 mg/dL    BUN 22 8 - 23 mg/dL    Creatinine 1.06 (H) 0.57 - 1.00 mg/dL    Sodium 135 (L) 136 - 145 mmol/L    Potassium 3.6 3.5 - 5.2 mmol/L    Chloride 94 (L) 98 - 107 mmol/L    CO2 25.5 22.0 - 29.0 mmol/L    Calcium 9.9 (H) 8.2 - 9.6 mg/dL    Total Protein 7.9 6.0 - 8.5 g/dL    Albumin 3.90 3.50 - 5.20 g/dL    ALT (SGPT) 17 1 - 33 U/L    AST (SGOT) 31 1 - 32 U/L    Alkaline Phosphatase 79 39 - 117 U/L    Total Bilirubin 0.3 0.2 - 1.2 mg/dL    eGFR Non African Amer 49 (L) >60 mL/min/1.73    Globulin 4.0 gm/dL    A/G Ratio 1.0 g/dL    BUN/Creatinine Ratio 20.8 7.0 - 25.0    Anion Gap 15.5 (H) 5.0 - 15.0 mmol/L   Protime-INR   Result Value Ref Range    Protime  13.5 12.1 - 15.0 Seconds    INR 1.06 0.90 - 1.10   aPTT   Result Value Ref Range    PTT 28.0 24.3 - 38.1 seconds   Lipase   Result Value Ref Range    Lipase 27 13 - 60 U/L   Urinalysis With Microscopic If Indicated (No Culture) - Urine, Clean Catch   Result Value Ref Range    Color, UA Yellow Yellow, Straw    Appearance, UA Clear Clear    pH, UA 5.5 4.5 - 8.0    Specific Gravity, UA 1.033 (H) 1.003 - 1.030    Glucose, UA Negative Negative    Ketones, UA Negative Negative    Bilirubin, UA Negative Negative    Blood, UA Trace (A) Negative    Protein, UA Negative Negative    Leuk Esterase, UA Small (1+) (A) Negative    Nitrite, UA Negative Negative    Urobilinogen, UA 0.2 E.U./dL 0.2 - 1.0 E.U./dL   Troponin   Result Value Ref Range    Troponin T <0.010 0.000 - 0.030 ng/mL   CBC Auto Differential   Result Value Ref Range    WBC 8.01 3.40 - 10.80 10*3/mm3    RBC 4.16 3.77 - 5.28 10*6/mm3    Hemoglobin 12.6 12.0 - 15.9 g/dL    Hematocrit 38.6 34.0 - 46.6 %    MCV 92.8 79.0 - 97.0 fL    MCH 30.3 26.6 - 33.0 pg    MCHC 32.6 31.5 - 35.7 g/dL    RDW 12.9 12.3 - 15.4 %    RDW-SD 43.9 37.0 - 54.0 fl    MPV 9.6 6.0 - 12.0 fL    Platelets 309 140 - 450 10*3/mm3    Neutrophil % 67.3 42.7 - 76.0 %    Lymphocyte % 19.9 19.6 - 45.3 %    Monocyte % 7.2 5.0 - 12.0 %    Eosinophil % 4.4 0.3 - 6.2 %    Basophil % 0.5 0.0 - 1.5 %    Immature Grans % 0.7 (H) 0.0 - 0.5 %    Neutrophils, Absolute 5.39 1.70 - 7.00 10*3/mm3    Lymphocytes, Absolute 1.59 0.70 - 3.10 10*3/mm3    Monocytes, Absolute 0.58 0.10 - 0.90 10*3/mm3    Eosinophils, Absolute 0.35 0.00 - 0.40 10*3/mm3    Basophils, Absolute 0.04 0.00 - 0.20 10*3/mm3    Immature Grans, Absolute 0.06 (H) 0.00 - 0.05 10*3/mm3    nRBC 0.0 0.0 - 0.2 /100 WBC   CK   Result Value Ref Range    Creatine Kinase 55 20 - 180 U/L   Urinalysis, Microscopic Only - Urine, Clean Catch   Result Value Ref Range    RBC, UA 3-5 (A) None Seen /HPF    WBC, UA 6-12 (A) None Seen /HPF    Bacteria, UA 1+ (A) None  Seen /HPF    Squamous Epithelial Cells, UA 3-6 (A) None Seen, 0-2 /HPF    Hyaline Casts, UA None Seen None Seen /LPF    Methodology Manual Light Microscopy    Troponin   Result Value Ref Range    Troponin T <0.010 0.000 - 0.030 ng/mL         I ordered the above labs and reviewed the results    RADIOLOGY  Xr Chest 1 View    Result Date: 4/23/2020  Narrative: CR Chest 1 Vw INDICATION: Fall today with weakness and syncope COMPARISON:  10/9/2012 FINDINGS: Single portable AP view(s) of the chest.  The heart and mediastinal contours are normal. The lungs are clear. No pneumothorax or pleural effusion.     Impression: No acute cardiopulmonary findings. Signer Name: Owen Auguste MD  Signed: 4/23/2020 5:18 PM  Workstation Name: [id number]  [facility]      I ordered the above radiologic testing and reviewed the results    PROCEDURES  Procedures      PROGRESS AND CONSULTS  ED Course as of Apr 23 1927   Thu Apr 23, 2020   1622 Old records reviewed and patient last cardiology visit was August, 2019.  February, 2019 echocardiogram showed mild, concentric hypertrophy with EF of 57%.  Aortic valve regurg noted.    [ML]   1625 EKG tracing was contemporaneously reviewed at 1425 hrs. and showed a normal sinus rhythm with a rate of 64 bpm.  Nonspecific, anterior T wave flattening noted.  No ectopy.  ST segments unremarkable    [ML]   1923 Case and findings discussed with the on-call hospitalist, Dr. Lanza, who felt that the patient's syncope was related to the patient's recent usage of Lasix.  Patient feeling better after 500 cc bolus of normal saline here.  Patient will be discharged with follow-up in cardiology.  All pertinent results discussed with patient and family.    [ML]      ED Course User Index  [ML] Kenroy Montes MD           MEDICAL DECISION MAKING  Results were reviewed/discussed with the patient and they were also made aware of online access. Pt also made aware that some labs, such as  cultures, will not be resulted during ER visit and follow up with PMD is necessary.     MDM       DIAGNOSIS  Final diagnoses:   Syncope and collapse       Latest Documented Vital Signs:  As of 19:27  BP- 158/67 HR- 75 Temp- 97.3 °F (36.3 °C) O2 sat- 98%    DISPOSITION  Discharged in stable condition       Medication List      Stop    furosemide 40 MG tablet  Commonly known as:  LASIX          Follow-up Information     Leeann Smith MD.    Specialty:  Family Medicine  Why:  As needed  Contact information:  [address]  [facility] [address] [phone]             Schedule an appointment as soon as possible for a visit  with Cuca Grier MD.    Specialty:  Cardiology  Contact information:  [address]  [facility] [address] [phone]                      Kenroy Montes MD  04/23/20 1927

## 2020-04-28 ENCOUNTER — OFFICE VISIT (OUTPATIENT)
Dept: CARDIOLOGY | Facility: CLINIC | Age: 85
End: 2020-04-28

## 2020-04-28 VITALS
HEART RATE: 67 BPM | WEIGHT: 139.4 LBS | SYSTOLIC BLOOD PRESSURE: 142 MMHG | DIASTOLIC BLOOD PRESSURE: 82 MMHG | BODY MASS INDEX: 27.37 KG/M2 | HEIGHT: 60 IN

## 2020-04-28 DIAGNOSIS — I48.0 PAF (PAROXYSMAL ATRIAL FIBRILLATION) (HCC): Primary | ICD-10-CM

## 2020-04-28 DIAGNOSIS — E78.2 MIXED HYPERLIPIDEMIA: ICD-10-CM

## 2020-04-28 DIAGNOSIS — I10 ESSENTIAL HYPERTENSION: ICD-10-CM

## 2020-04-28 DIAGNOSIS — I63.411 CEREBROVASCULAR ACCIDENT (CVA) DUE TO EMBOLISM OF RIGHT MIDDLE CEREBRAL ARTERY (HCC): ICD-10-CM

## 2020-04-28 PROCEDURE — 99442 PR PHYS/QHP TELEPHONE EVALUATION 11-20 MIN: CPT | Performed by: INTERNAL MEDICINE

## 2020-04-28 RX ORDER — ISOSORBIDE MONONITRATE 60 MG/1
60 TABLET, EXTENDED RELEASE ORAL EVERY MORNING
Qty: 30 TABLET | Refills: 11 | Status: SHIPPED | OUTPATIENT
Start: 2020-04-28 | End: 2020-08-27

## 2020-04-28 NOTE — PROGRESS NOTES
Date of Office Visit: 2020  Encounter Provider: Cuca Grier MD  Place of Service: [facility] CARDIOLOGY  Patient Name: Doris Coreas  :1928      Patient ID:  Doris Coreas is a 89 y.o. female is here for  followup for ER visit with syncope.         History of Present Illness    She has hypertension and hyperlipidemia. She was on Losartan but apparently developed a rash with the Losartan and had to stop this.She was placed on amlodipine at 10 mg daily but then had a lot of ankle edema.       In 10/2010 she did have a stress nuclear perfusion study which was negative for any evidence of ischemia. This was performed at [facility].      She was admitted May 2017 with visual changes and left hand weakness.  She was found to have stroke and the MRI of her brain showed multiple strokes in the right middle cerebral artery territory.  The MRA showed no evidence of carotid stenosis.  She's no longer able to drive because of visual changes.  She was placed on Plavix at that time and no ADDI was done.  She did have lower extremity venous duplex studies done May 2017 showing no DVT.  She had an echocardiogram done May 2017 which showed normal ejection fraction 61%, normal saline contrast study and no significant valve disease.  She then wore Ziopatch which showed multiple bursts of paroxysmal atrial fibrillation.  This is likely the source of her stroke.  Her TSH was elevated at 7.7 during that admission.  Her Synthroid dosing was changed then. She was placed on xarelto.      She has exertional dyspnea.  She had an echocardiogram done 2019 showing ejection fraction 57% with grade 1A diastolic dysfunction and aortic valve calcification without stenosis.  His stress nuclear perfusion study in 2019 showing a small to medium sized area of mild ischemia in the lateral wall.  This is been treated medically.     She had syncope and went to the ER 20.  She  had been sleepy and took a lasix right before she laid down to take a nap.  She got from her nap and went to bathroom and passed out on the toilet and fell.  She could not get up and her daughter found her on the floor and called 911.  She had not been feeling ill prior to this and really did not have any warning.   Her labs showed normal troponin X2, K+ 3.6, glucose 128, sodium 135, otherwise normal CMP, normal CBC, normal UA. ECG and CXR were normal.  She is very dyspneic with activity but better with rest and she has no chest pain.  She has had LE edema. She has no f/c/cough.  Her energy level is poor.  She lives alone but her daughter sees her daily. She does not have orthopnea or PND.  She feels that the imdur helps her breathing.        Past Medical History:   Diagnosis Date   • Carotid artery stenosis    • Chronic renal insufficiency    • Compression fracture of lumbar vertebra (CMS/HCC)    • Difficulty breathing    • Fatigue    • H/O echocardiogram 05/20/2015   • Health care maintenance    • History of EKG 09/25/2015   • Hyperlipidemia    • Hypertension    • Hypothyroidism    • PAF (paroxysmal atrial fibrillation) (CMS/HCC)    • Stroke (CMS/[facility])    • Transfusion history 07/2019         Past Surgical History:   Procedure Laterality Date   • CATARACT EXTRACTION     • CHOLECYSTECTOMY     • HERNIA REPAIR         Current Outpatient Medications on File Prior to Visit   Medication Sig Dispense Refill   • amoxicillin-clavulanate (AUGMENTIN) 250-125 MG per tablet Take 1 tablet by mouth Daily.     • carvedilol (COREG) 3.125 MG tablet Take 1 tablet by mouth 2 (Two) Times a Day With Meals. 180 tablet 3   • Cholecalciferol (VITAMIN D PO) Take 1 tablet by mouth Daily.     • famotidine (PEPCID) 40 MG tablet Take 40 mg by mouth Daily.     • ferrous sulfate 325 (65 FE) MG tablet Take 325 mg by mouth.     • isosorbide mononitrate (IMDUR) 30 MG 24 hr tablet TAKE ONE TABLET BY MOUTH EVERY MORNING 90 tablet 2   • levothyroxine  "(SYNTHROID, LEVOTHROID) 75 MCG tablet Take 75 mcg by mouth Daily.     • miconazole (MICOTIN) 2 % powder Apply  topically to the appropriate area as directed As Needed.     • rivaroxaban (XARELTO) 15 MG tablet Take 15 mg by mouth Daily.     • simvastatin (ZOCOR) 40 MG tablet Every Night.     • spironolactone (ALDACTONE) 25 MG tablet Take 1 tablet by mouth Daily. 90 tablet 3   • B Complex Vitamins (VITAMIN B COMPLEX) tablet Take 1 tablet by mouth Daily.       No current facility-administered medications on file prior to visit.        Social History     Socioeconomic History   • Marital status:      Spouse name: Not on file   • Number of children: Not on file   • Years of education: Not on file   • Highest education level: Not on file   Tobacco Use   • Smoking status: Never Smoker   • Smokeless tobacco: Never Used   • Tobacco comment: caffeine use: 2 cups daily.    Substance and Sexual Activity   • Alcohol use: No   • Drug use: No   • Sexual activity: Defer           ROS    Procedures  Procedures        Objective:      Vitals:    04/28/20 1319   BP: 142/82   Pulse: 67   Weight: 63.2 kg (139 lb 6.4 oz)   Height: 152.4 cm (60\")     Body mass index is 27.22 kg/m².    Physical Exam    telephone visit due to covid-19    Lab Review:       Assessment:      Diagnosis Plan   1. PAF (paroxysmal atrial fibrillation) (CMS/HCC)     2. Cerebrovascular accident (CVA) due to embolism of right middle cerebral artery (CMS/HCC)     3. Essential hypertension     4. Mixed hyperlipidemia       1. Hypertension; well controlled.   2. No PERLA, tested 2014.  3. Mild bilateral carotid stenosis. Stable.   4. Hyperlipidemia. On simvastatin.   5. Back pain, stable.   6. CVA due to PAF 5/2017.  on xarelto 20mg daily with food.   7. Dyspnea on exertion with LE edema.  This is likely due to deconditioning, diastolic dysfunction and may be coronary ischemia.  increase Imdur.  8. Syncope while on toilet after getting up from nap and taking lasix " just prior to nap.  Likely was BP dropping but may need monitor.      Plan:       See back in 3 months, increase imdur to 60mg in am.  May need a monitor given syncope.     This patient has consented to a telehealth visit via telephone. The visit was scheduled as a telephone visit to comply with patient safety concerns in accordance with CDC recommendations.  All vitals recorded within this visit are reported by the patient.  I spent  20 minutes in total including but not limited to the 11 minutes spent in direct conversation with this patient.

## 2020-08-26 NOTE — PROGRESS NOTES
Date of Office Visit: 2020  Encounter Provider: FOZIA Jones  Place of Service: [facility] CARDIOLOGY  Patient Name: Doris Coreas  :1928  Primary Cardiologist: Dr. Grier    CC:  3 month follow up    Dear Dr. Smith    HPI: Doris Coreas is a pleasant 89 y.o. female who presents 2020 for cardiac follow up.   She has hypertension and hyperlipidemia. She was on Losartan but apparently developed a rash with the Losartan and had to stop this.She was placed on amlodipine at 10 mg daily but then had a lot of ankle edema.       In 10/2010 she did have a stress nuclear perfusion study which was negative for any evidence of ischemia. This was performed at [facility].      She was admitted May 2017 with visual changes and left hand weakness.  She was found to have stroke and the MRI of her brain showed multiple strokes in the right middle cerebral artery territory.  The MRA showed no evidence of carotid stenosis.  She's no longer able to drive because of visual changes.  She was placed on Plavix at that time and no ADDI was done.  She did have lower extremity venous duplex studies done May 2017 showing no DVT.  She had an echocardiogram done May 2017 which showed normal ejection fraction 61%, normal saline contrast study and no significant valve disease.  She then wore Ziopatch which showed multiple bursts of paroxysmal atrial fibrillation.  This is likely the source of her stroke.  Her TSH was elevated at 7.7 during that admission.  Her Synthroid dosing was changed then. She was placed on xarelto.      She has exertional dyspnea.  She had an echocardiogram done 2019 showing ejection fraction 57% with grade 1A diastolic dysfunction and aortic valve calcification without stenosis.  His stress nuclear perfusion study in 2019 showing a small to medium sized area of mild ischemia in the lateral wall.  This is been treated medically.      She had  syncope and went to the ER 4/23/20.  She had been sleepy and took a lasix right before she laid down to take a nap.  She got from her nap and went to bathroom and passed out on the toilet and fell.  She could not get up and her daughter found her on the floor and called 911.  She had not been feeling ill prior to this and really did not have any warning.   Her labs showed normal troponin X2, K+ 3.6, glucose 128, sodium 135, otherwise normal CMP, normal CBC, normal UA. ECG and CXR were normal.  She is very dyspneic with activity but better with rest and she has no chest pain.  She has had LE edema. She has no f/c/cough.  Her energy level is poor.  She lives alone but her daughter sees her daily. She does not have orthopnea or PND.  She feels that the imdur helps her breathing.      She cannot tolerate the higher dose of the door, it made her very dizzy.  She was back to taking the 30 mg daily and does well with that.  She denies any palpitations or shortness of breath.  She has not had any further episodes of dizziness, lightheadedness, syncope or presyncope.  She denies any chest pain or chest pressure.  She does have some chronic fatigue.  She continues with chronic lower extremity edema.  She is taking her medications as directed.  She denies any bleeding, dark or tarry stools.  Her blood pressure is stable.  Heart rate is well controlled.    Past Medical History:   Diagnosis Date   • Carotid artery stenosis    • Chronic renal insufficiency    • Compression fracture of lumbar vertebra (CMS/HCC)    • Difficulty breathing    • Fatigue    • H/O echocardiogram 05/20/2015   • Health care maintenance    • History of EKG 09/25/2015   • Hyperlipidemia    • Hypertension    • Hypothyroidism    • PAF (paroxysmal atrial fibrillation) (CMS/HCC)    • Stroke (CMS/HCC)    • Transfusion history 07/2019       Past Surgical History:   Procedure Laterality Date   • CATARACT EXTRACTION     • CHOLECYSTECTOMY     • HERNIA REPAIR          Social History     Socioeconomic History   • Marital status:      Spouse name: Not on file   • Number of children: Not on file   • Years of education: Not on file   • Highest education level: Not on file   Tobacco Use   • Smoking status: Never Smoker   • Smokeless tobacco: Never Used   • Tobacco comment: caffeine use: 2 cups daily.    Substance and Sexual Activity   • Alcohol use: No   • Drug use: No   • Sexual activity: Defer       Family History   Problem Relation Age of Onset   • Heart failure Mother        The following portion of the patient's history were reviewed and updated as appropriate: past medical history, past surgical history, past social history, past family history, allergies, current medications, and problem list.    Review of Systems   Constitution: Positive for malaise/fatigue. Negative for diaphoresis and fever.   HENT: Negative for congestion, hearing loss, hoarse voice, nosebleeds and sore throat.    Eyes: Negative for photophobia, vision loss in left eye, vision loss in right eye and visual disturbance.   Cardiovascular: Positive for leg swelling. Negative for chest pain, dyspnea on exertion, irregular heartbeat, near-syncope, orthopnea, palpitations, paroxysmal nocturnal dyspnea and syncope.   Respiratory: Negative for cough, hemoptysis, shortness of breath, sleep disturbances due to breathing, snoring, sputum production and wheezing.    Endocrine: Negative for cold intolerance, heat intolerance, polydipsia, polyphagia and polyuria.   Hematologic/Lymphatic: Negative for bleeding problem. Does not bruise/bleed easily.   Skin: Negative for color change, dry skin, poor wound healing, rash and suspicious lesions.   Musculoskeletal: Negative for arthritis, back pain, falls, gout, joint pain, joint swelling, muscle cramps, muscle weakness and myalgias.   Gastrointestinal: Negative for bloating, abdominal pain, constipation, diarrhea, dysphagia, melena, nausea and vomiting.  "  Neurological: Negative for excessive daytime sleepiness, dizziness, headaches, light-headedness, loss of balance, numbness, paresthesias, seizures, vertigo and weakness.   Psychiatric/Behavioral: Negative for depression, memory loss and substance abuse. The patient is not nervous/anxious.        Allergies   Allergen Reactions   • Azithromycin    • Levofloxacin    • Losartan    • Losartan Potassium    • Macrodantin [Nitrofurantoin] Rash         Current Outpatient Medications:   •  amoxicillin-clavulanate (AUGMENTIN) 250-125 MG per tablet, Take 1 tablet by mouth Daily., Disp: , Rfl:   •  B Complex Vitamins (VITAMIN B COMPLEX) tablet, Take 1 tablet by mouth Daily., Disp: , Rfl:   •  carvedilol (COREG) 3.125 MG tablet, Take 1 tablet by mouth 2 (Two) Times a Day With Meals., Disp: 180 tablet, Rfl: 3  •  Cholecalciferol (VITAMIN D PO), Take 1 tablet by mouth Daily., Disp: , Rfl:   •  famotidine (PEPCID) 40 MG tablet, Take 40 mg by mouth Daily., Disp: , Rfl:   •  ferrous sulfate 325 (65 FE) MG tablet, Take 325 mg by mouth., Disp: , Rfl:   •  isosorbide mononitrate (IMDUR) 60 MG 24 hr tablet, Take 1 tablet by mouth Every Morning. (Patient taking differently: Take 30 mg by mouth Every Morning.), Disp: 30 tablet, Rfl: 11  •  levothyroxine (SYNTHROID, LEVOTHROID) 75 MCG tablet, Take 75 mcg by mouth Daily., Disp: , Rfl:   •  miconazole (MICOTIN) 2 % powder, Apply  topically to the appropriate area as directed As Needed., Disp: , Rfl:   •  rivaroxaban (XARELTO) 15 MG tablet, Take 15 mg by mouth Daily., Disp: , Rfl:   •  simvastatin (ZOCOR) 40 MG tablet, Every Night., Disp: , Rfl:         Objective:     Vitals:    08/27/20 1358   BP: 132/80   Pulse: 71   Resp: 16   SpO2: 96%   Weight: 64.9 kg (143 lb)   Height: 152.4 cm (60\")     Body mass index is 27.93 kg/m².      Physical Exam   Constitutional: She is oriented to person, place, and time. She appears well-developed and well-nourished. No distress.   Frail, elderly   HENT: "   Head: Normocephalic and atraumatic.   Right Ear: External ear normal.   Left Ear: External ear normal.   Nose: Nose normal.   Eyes: Pupils are equal, round, and reactive to light. Conjunctivae are normal. Right eye exhibits no discharge. Left eye exhibits no discharge.   Neck: Normal range of motion. Neck supple. No JVD present. No tracheal deviation present. No thyromegaly present.   Cardiovascular: Normal rate, regular rhythm, normal heart sounds and intact distal pulses. Exam reveals no gallop and no friction rub.   No murmur heard.  Pulmonary/Chest: Effort normal and breath sounds normal. No respiratory distress. She has no wheezes. She has no rales. She exhibits no tenderness.   Abdominal: Soft. Bowel sounds are normal. She exhibits no distension. There is no tenderness.   Musculoskeletal: Normal range of motion. She exhibits edema. She exhibits no tenderness or deformity.   kyphosis   Lymphadenopathy:     She has no cervical adenopathy.   Neurological: She is alert and oriented to person, place, and time. Coordination normal.   Skin: Skin is warm and dry. No rash noted. No erythema.   Psychiatric: She has a normal mood and affect. Her behavior is normal. Judgment and thought content normal.   Vitals reviewed.            ECG 12 Lead  Date/Time: 8/27/2020 2:19 PM  Performed by: Kim Christianson APRN  Authorized by: Kim Christianson APRN   Comparison: compared with previous ECG from 8/19/2019  Similar to previous ECG  Rhythm: sinus rhythm  Rate: normal  Conduction: conduction normal  ST Segments: ST segments normal  T Waves: T waves normal  QRS axis: normal    Clinical impression: normal ECG              Assessment:       Diagnosis Plan   1. Essential hypertension     2. PAF (paroxysmal atrial fibrillation) (CMS/HCC)     3. Mixed hyperlipidemia     4. Cerebrovascular accident (CVA) due to embolism of right middle cerebral artery (CMS/HCC)            Plan:       1. Hypertension - well controlled  2. No PERLA, tested  2014.  3. Mild bilateral carotid stenosis - stable   4. Hyperlipidemia - continue on lipid lowering therapy, currently on simvastatin.   5. Back pain -  stable.   6. CVA due to PAF 5/2017.  on xarelto 15 mg daily with food.   7. Dyspnea on exertion with LE edema.  This is likely due to deconditioning, diastolic dysfunction and may be coronary ischemia.  Could not tolerate increased dose of Imdur, currently taking 30 mg daily.  8. Syncope while on toilet after getting up from nap and taking lasix just prior to nap.  Likely was BP dropping but may need monitor. No further episodes of dizziness or syncope.  9.  LE edema - She used to use compression stockings but she cannot put them on.  I have asked her to have her daughter gently wrap her LE with ace wraps to see if that helps.  She will try that and call with update.     RTO in January 2021 with RM       As always, it has been a pleasure to participate in your patient's care. Thank you.       Sincerely,         FOZIA Jones      Current Outpatient Medications:   •  amoxicillin-clavulanate (AUGMENTIN) 250-125 MG per tablet, Take 1 tablet by mouth Daily., Disp: , Rfl:   •  B Complex Vitamins (VITAMIN B COMPLEX) tablet, Take 1 tablet by mouth Daily., Disp: , Rfl:   •  carvedilol (COREG) 3.125 MG tablet, Take 1 tablet by mouth 2 (Two) Times a Day With Meals., Disp: 180 tablet, Rfl: 3  •  Cholecalciferol (VITAMIN D PO), Take 1 tablet by mouth Daily., Disp: , Rfl:   •  famotidine (PEPCID) 40 MG tablet, Take 40 mg by mouth Daily., Disp: , Rfl:   •  ferrous sulfate 325 (65 FE) MG tablet, Take 325 mg by mouth., Disp: , Rfl:   •  isosorbide mononitrate (IMDUR) 60 MG 24 hr tablet, Take 0.5 tablets by mouth Every Morning., Disp: 30 tablet, Rfl: 6  •  levothyroxine (SYNTHROID, LEVOTHROID) 75 MCG tablet, Take 75 mcg by mouth Daily., Disp: , Rfl:   •  miconazole (MICOTIN) 2 % powder, Apply  topically to the appropriate area as directed As Needed., Disp: , Rfl:   •  rivaroxaban  (XARELTO) 15 MG tablet, Take 1 tablet by mouth Daily., Disp: 30 tablet, Rfl: 6  •  simvastatin (ZOCOR) 40 MG tablet, Every Night., Disp: , Rfl:   •  spironolactone (ALDACTONE) 25 MG tablet, Take 25 mg by mouth Daily., Disp: , Rfl:     Dictated utilizing Dragon dictation

## 2020-08-27 ENCOUNTER — OFFICE VISIT (OUTPATIENT)
Dept: CARDIOLOGY | Facility: CLINIC | Age: 85
End: 2020-08-27

## 2020-08-27 VITALS
BODY MASS INDEX: 28.07 KG/M2 | HEART RATE: 71 BPM | RESPIRATION RATE: 16 BRPM | OXYGEN SATURATION: 96 % | HEIGHT: 60 IN | DIASTOLIC BLOOD PRESSURE: 80 MMHG | WEIGHT: 143 LBS | SYSTOLIC BLOOD PRESSURE: 132 MMHG

## 2020-08-27 DIAGNOSIS — E78.2 MIXED HYPERLIPIDEMIA: ICD-10-CM

## 2020-08-27 DIAGNOSIS — I63.411 CEREBROVASCULAR ACCIDENT (CVA) DUE TO EMBOLISM OF RIGHT MIDDLE CEREBRAL ARTERY (HCC): ICD-10-CM

## 2020-08-27 DIAGNOSIS — I48.0 PAF (PAROXYSMAL ATRIAL FIBRILLATION) (HCC): ICD-10-CM

## 2020-08-27 DIAGNOSIS — I10 ESSENTIAL HYPERTENSION: Primary | ICD-10-CM

## 2020-08-27 PROCEDURE — 93000 ELECTROCARDIOGRAM COMPLETE: CPT | Performed by: NURSE PRACTITIONER

## 2020-08-27 PROCEDURE — 99214 OFFICE O/P EST MOD 30 MIN: CPT | Performed by: NURSE PRACTITIONER

## 2020-08-27 RX ORDER — SPIRONOLACTONE 25 MG/1
25 TABLET ORAL DAILY
COMMUNITY
End: 2021-06-04

## 2020-08-27 RX ORDER — ISOSORBIDE MONONITRATE 60 MG/1
30 TABLET, EXTENDED RELEASE ORAL EVERY MORNING
Qty: 30 TABLET | Refills: 6
Start: 2020-08-27 | End: 2020-11-17

## 2020-11-17 RX ORDER — ISOSORBIDE MONONITRATE 30 MG/1
TABLET, EXTENDED RELEASE ORAL
Qty: 90 TABLET | Refills: 1 | Status: SHIPPED | OUTPATIENT
Start: 2020-11-17 | End: 2021-05-13

## 2021-01-25 ENCOUNTER — IMMUNIZATION (OUTPATIENT)
Dept: VACCINE CLINIC | Facility: HOSPITAL | Age: 86
End: 2021-01-25

## 2021-01-25 PROCEDURE — 0011A: CPT | Performed by: OBSTETRICS & GYNECOLOGY

## 2021-01-25 PROCEDURE — 91301 HC SARSCO02 VAC 100MCG/0.5ML IM: CPT | Performed by: OBSTETRICS & GYNECOLOGY

## 2021-02-22 ENCOUNTER — IMMUNIZATION (OUTPATIENT)
Dept: VACCINE CLINIC | Facility: HOSPITAL | Age: 86
End: 2021-02-22

## 2021-02-22 PROCEDURE — 91301 HC SARSCO02 VAC 100MCG/0.5ML IM: CPT | Performed by: OBSTETRICS & GYNECOLOGY

## 2021-02-22 PROCEDURE — 0012A: CPT | Performed by: OBSTETRICS & GYNECOLOGY

## 2021-03-05 ENCOUNTER — OFFICE VISIT (OUTPATIENT)
Dept: CARDIOLOGY | Facility: CLINIC | Age: 86
End: 2021-03-05

## 2021-03-05 VITALS
HEIGHT: 60 IN | BODY MASS INDEX: 29.08 KG/M2 | HEART RATE: 78 BPM | DIASTOLIC BLOOD PRESSURE: 70 MMHG | WEIGHT: 148.1 LBS | SYSTOLIC BLOOD PRESSURE: 150 MMHG

## 2021-03-05 DIAGNOSIS — I48.0 PAF (PAROXYSMAL ATRIAL FIBRILLATION) (HCC): Primary | ICD-10-CM

## 2021-03-05 DIAGNOSIS — I10 ESSENTIAL HYPERTENSION: ICD-10-CM

## 2021-03-05 DIAGNOSIS — E78.2 MIXED HYPERLIPIDEMIA: ICD-10-CM

## 2021-03-05 PROCEDURE — 99214 OFFICE O/P EST MOD 30 MIN: CPT | Performed by: INTERNAL MEDICINE

## 2021-03-05 PROCEDURE — 93000 ELECTROCARDIOGRAM COMPLETE: CPT | Performed by: INTERNAL MEDICINE

## 2021-03-05 RX ORDER — IPRATROPIUM BROMIDE 42 UG/1
2 SPRAY, METERED NASAL 4 TIMES DAILY
COMMUNITY

## 2021-03-05 RX ORDER — AMLODIPINE BESYLATE 5 MG/1
5 TABLET ORAL DAILY
COMMUNITY
End: 2021-03-05

## 2021-03-05 RX ORDER — CARVEDILOL 6.25 MG/1
6.25 TABLET ORAL 2 TIMES DAILY WITH MEALS
Qty: 180 TABLET | Refills: 3 | Status: SHIPPED | COMMUNITY
Start: 2021-03-05 | End: 2021-06-04 | Stop reason: SDUPTHER

## 2021-03-05 RX ORDER — FUROSEMIDE 40 MG/1
40 TABLET ORAL DAILY PRN
COMMUNITY

## 2021-03-05 RX ORDER — AMLODIPINE BESYLATE 2.5 MG/1
2.5 TABLET ORAL DAILY
Qty: 90 TABLET | Refills: 3 | Status: ON HOLD | COMMUNITY
Start: 2021-03-05 | End: 2022-12-06

## 2021-03-05 NOTE — PROGRESS NOTES
Date of Office Visit: 2021  Encounter Provider: Cuca Grier MD  Place of Service: [facility] CARDIOLOGY  Patient Name: Doris Coreas  :1928      Patient ID:  Doris Coreas is a 89 y.o. female is here for  followup for PAF.         History of Present Illness    She has hypertension and hyperlipidemia. She was on Losartan but apparently developed a rash with the Losartan and had to stop this.She was placed on amlodipine at 10 mg daily but then had a lot of ankle edema.       In 10/2010 she did have a stress nuclear perfusion study which was negative for any evidence of ischemia. This was performed at [facility].      She was admitted May 2017 with visual changes and left hand weakness.  She was found to have stroke and the MRI of her brain showed multiple strokes in the right middle cerebral artery territory.  The MRA showed no evidence of carotid stenosis.  She's no longer able to drive because of visual changes.  She was placed on Plavix at that time and no ADDI was done.  She did have lower extremity venous duplex studies done May 2017 showing no DVT.  She had an echocardiogram done May 2017 which showed normal ejection fraction 61%, normal saline contrast study and no significant valve disease.  She then wore Ziopatch which showed multiple bursts of paroxysmal atrial fibrillation.  This is likely the source of her stroke.  Her TSH was elevated at 7.7 during that admission.  Her Synthroid dosing was changed then. She was placed on xarelto.      She had an echocardiogram done 2019 showing ejection fraction 57% with grade 1A diastolic dysfunction and aortic valve calcification without stenosis.  His stress nuclear perfusion study in 2019 showing a small to medium sized area of mild ischemia in the lateral wall.  This is been treated medically.      She had syncope and went to the ER 20.  She had been sleepy and took a lasix right before  she laid down to take a nap.  She got from her nap and went to bathroom and passed out on the toilet and fell.  She could not get up and her daughter found her on the floor and called 911.  She had not been feeling ill prior to this and really did not have any warning.   Her labs showed normal troponin X2, K+ 3.6, glucose 128, sodium 135, otherwise normal CMP, normal CBC, normal UA. ECG and CXR were normal.       Labs 11/10/2020 show normal CMP, normal CBC.  She cannot tolerate more than 30 mg of Imdur as it makes her dizzy.    She has had more lower extremity edema and is on amlodipine 5 mg daily.  She has had no dizziness or falls and is very careful when she ambulates.  She started had both her vaccines.  She has had no fevers, chills or cough.  She has chronic exertional dyspnea which is unchanged.  Occasionally she gets a little headed in the morning.  She is very careful that happens.  She does not feel her heart racing or skipping.  She has no orthopnea or PND.  She has no exertional chest pain.    Past Medical History:   Diagnosis Date   • Carotid artery stenosis    • Chronic renal insufficiency    • Compression fracture of lumbar vertebra (CMS/HCC)    • Difficulty breathing    • Fatigue    • H/O echocardiogram 05/20/2015   • Health care maintenance    • History of EKG 09/25/2015   • Hyperlipidemia    • Hypertension    • Hypothyroidism    • PAF (paroxysmal atrial fibrillation) (CMS/HCC)    • Stroke (CMS/HCC)    • Transfusion history 07/2019         Past Surgical History:   Procedure Laterality Date   • CATARACT EXTRACTION     • CHOLECYSTECTOMY     • HERNIA REPAIR         Current Outpatient Medications on File Prior to Visit   Medication Sig Dispense Refill   • amLODIPine (NORVASC) 5 MG tablet Take 5 mg by mouth Daily.     • amoxicillin-clavulanate (AUGMENTIN) 250-125 MG per tablet Take 1 tablet by mouth Daily.     • B Complex Vitamins (VITAMIN B COMPLEX) tablet Take 1 tablet by mouth Daily.     • carvedilol  "(COREG) 3.125 MG tablet Take 1 tablet by mouth 2 (Two) Times a Day With Meals. 180 tablet 3   • Cholecalciferol (VITAMIN D PO) Take 1 tablet by mouth Daily.     • famotidine (PEPCID) 40 MG tablet Take 40 mg by mouth Daily.     • ferrous sulfate 325 (65 FE) MG tablet Take 325 mg by mouth.     • furosemide (LASIX) 20 MG tablet Take 20 mg by mouth 2 (Two) Times a Day.     • ipratropium (ATROVENT) 0.06 % nasal spray 2 sprays into the nostril(s) as directed by provider 4 (Four) Times a Day.     • isosorbide mononitrate (IMDUR) 30 MG 24 hr tablet TAKE ONE TABLET BY MOUTH EVERY MORNING 90 tablet 1   • levothyroxine (SYNTHROID, LEVOTHROID) 75 MCG tablet Take 75 mcg by mouth Daily.     • miconazole (MICOTIN) 2 % powder Apply  topically to the appropriate area as directed As Needed.     • rivaroxaban (XARELTO) 15 MG tablet Take 1 tablet by mouth Daily. 30 tablet 6   • simvastatin (ZOCOR) 40 MG tablet Every Night.     • spironolactone (ALDACTONE) 25 MG tablet Take 25 mg by mouth Daily.       No current facility-administered medications on file prior to visit.        Social History     Socioeconomic History   • Marital status:      Spouse name: Not on file   • Number of children: Not on file   • Years of education: Not on file   • Highest education level: Not on file   Tobacco Use   • Smoking status: Never Smoker   • Smokeless tobacco: Never Used   Substance and Sexual Activity   • Alcohol use: No     Comment: caffeine use: 2 cups daily.    • Drug use: No   • Sexual activity: Defer           ROS    Procedures    ECG 12 Lead    Date/Time: 3/5/2021 2:50 PM  Performed by: Cuca Grier MD  Authorized by: Cuca Grier MD   Comparison: compared with previous ECG   Similar to previous ECG  Rhythm: sinus rhythm    Clinical impression: normal ECG                Objective:      Vitals:    03/05/21 1438   BP: 150/70   BP Location: Left arm   Pulse: 78   Weight: 67.2 kg (148 lb 1.6 oz)   Height: 152.4 cm (60\") "     Body mass index is 28.92 kg/m².    Vitals signs reviewed.   Constitutional:       General: Not in acute distress.     Appearance: Well-developed. Not diaphoretic.   Eyes:      General: No scleral icterus.     Conjunctiva/sclera: Conjunctivae normal.   HENT:      Head: Normocephalic and atraumatic.   Neck:      Musculoskeletal: Neck supple.      Thyroid: No thyromegaly.      Vascular: No carotid bruit or JVD.      Lymphadenopathy: No cervical adenopathy.   Pulmonary:      Effort: Pulmonary effort is normal. No respiratory distress.      Breath sounds: Normal breath sounds. No wheezing. No rhonchi. No rales.   Chest:      Chest wall: Not tender to palpatation.   Cardiovascular:      Normal rate. Regular rhythm.      Murmurs: There is a grade 2/6 midsystolic murmur at the URSB and ULSB.      No gallop.   Pulses:     Intact distal pulses.   Edema:     Peripheral edema absent.   Abdominal:      General: Bowel sounds are normal. There is no distension or abdominal bruit.      Palpations: Abdomen is soft. There is no abdominal mass.      Tenderness: There is no abdominal tenderness.   Musculoskeletal:         General: No deformity.      Extremities: No clubbing present.  Skin:     General: Skin is warm and dry. There is no cyanosis.      Coloration: Skin is not pale.      Findings: No rash.   Neurological:      Mental Status: Alert and oriented to person, place, and time.      Cranial Nerves: No cranial nerve deficit.   Psychiatric:         Judgment: Judgment normal.         Lab Review:       Assessment:      Diagnosis Plan   1. PAF (paroxysmal atrial fibrillation) (CMS/HCC)     2. Essential hypertension     3. Mixed hyperlipidemia       1. Hypertension; well controlled.   2. No PERLA, tested 2014.  3. Mild bilateral carotid stenosis. Stable.   4. Hyperlipidemia. On simvastatin.   5. Back pain, stable.   6. CVA due to PAF 5/2017.  on xarelto 15mg daily with food.   7. Dyspnea on exertion with LE edema.  This is likely  due to deconditioning, diastolic dysfunction and may be coronary ischemia.  8. Syncope while on toilet after getting up from nap and taking lasix just prior to nap.  Likely was BP dropping but may need monitor.      Plan:       Decrease amlodipine to 2.5 mg daily due to lower extremity edema and increase carvedilol to 6.25 twice daily, see Caleb in 3 months, no other testing at this time.

## 2021-05-13 RX ORDER — ISOSORBIDE MONONITRATE 30 MG/1
TABLET, EXTENDED RELEASE ORAL
Qty: 90 TABLET | Refills: 3 | Status: SHIPPED | OUTPATIENT
Start: 2021-05-13 | End: 2022-06-03

## 2021-06-04 ENCOUNTER — OFFICE VISIT (OUTPATIENT)
Dept: CARDIOLOGY | Facility: CLINIC | Age: 86
End: 2021-06-04

## 2021-06-04 VITALS
BODY MASS INDEX: 29.64 KG/M2 | RESPIRATION RATE: 16 BRPM | HEIGHT: 60 IN | HEART RATE: 70 BPM | OXYGEN SATURATION: 97 % | DIASTOLIC BLOOD PRESSURE: 80 MMHG | SYSTOLIC BLOOD PRESSURE: 146 MMHG | WEIGHT: 151 LBS

## 2021-06-04 DIAGNOSIS — E78.2 MIXED HYPERLIPIDEMIA: ICD-10-CM

## 2021-06-04 DIAGNOSIS — I48.0 PAF (PAROXYSMAL ATRIAL FIBRILLATION) (HCC): Primary | ICD-10-CM

## 2021-06-04 DIAGNOSIS — I10 ESSENTIAL HYPERTENSION: ICD-10-CM

## 2021-06-04 PROCEDURE — 99214 OFFICE O/P EST MOD 30 MIN: CPT | Performed by: NURSE PRACTITIONER

## 2021-06-04 RX ORDER — CARVEDILOL 6.25 MG/1
6.25 TABLET ORAL 2 TIMES DAILY WITH MEALS
Qty: 60 TABLET | Refills: 6 | Status: ON HOLD | OUTPATIENT
Start: 2021-06-04 | End: 2022-12-07 | Stop reason: SDUPTHER

## 2021-06-04 NOTE — PROGRESS NOTES
Date of Office Visit: 2021  Encounter Provider: FOZIA Jones  Place of Service: [facility] CARDIOLOGY  Patient Name: Doris Coreas  :1928  Primary Cardiologist: Dr. Grier    CC:  3 month follow up    Dear Dr. Smith    HPI: Doris Coreas is a pleasant 89 y.o. female who presents 2021 for cardiac follow up. I have reviewed her ast medical records including labs, testing and notes in preparation for today's visit.    She has hypertension and hyperlipidemia. She was on Losartan but apparently developed a rash with the Losartan and had to stop this.She was placed on amlodipine at 10 mg daily but then had a lot of ankle edema.       In 10/2010 she did have a stress nuclear perfusion study which was negative for any evidence of ischemia. This was performed at [facility].      She was admitted May 2017 with visual changes and left hand weakness.  She was found to have stroke and the MRI of her brain showed multiple strokes in the right middle cerebral artery territory.  The MRA showed no evidence of carotid stenosis.  She's no longer able to drive because of visual changes.  She was placed on Plavix at that time and no ADDI was done.  She did have lower extremity venous duplex studies done May 2017 showing no DVT.  She had an echocardiogram done May 2017 which showed normal ejection fraction 61%, normal saline contrast study and no significant valve disease.  She then wore Ziopatch which showed multiple bursts of paroxysmal atrial fibrillation.  This is likely the source of her stroke.  Her TSH was elevated at 7.7 during that admission.  Her Synthroid dosing was changed then. She was placed on xarelto.      She had an echocardiogram done 2019 showing ejection fraction 57% with grade 1A diastolic dysfunction and aortic valve calcification without stenosis.  His stress nuclear perfusion study in 2019 showing a small to medium sized area of  mild ischemia in the lateral wall.  This is been treated medically.      She had syncope and went to the ER 4/23/20.  She had been sleepy and took a lasix right before she laid down to take a nap.  She got from her nap and went to bathroom and passed out on the toilet and fell.  She could not get up and her daughter found her on the floor and called 911.  She had not been feeling ill prior to this and really did not have any warning.   Her labs showed normal troponin X2, K+ 3.6, glucose 128, sodium 135, otherwise normal CMP, normal CBC, normal UA. ECG and CXR were normal.         She cannot tolerate more than 30 mg of Imdur as it makes her dizzy.     When seen in March 2021, she had been having more lower extremity edema while taking the amlodipine 5 mg daily.  This was cut back to 2.5 mg daily.     She presents today in follow-up.  She states she actually has not been taking the amlodipine at all.  She states over the course of this year she has gained 10 pounds.  She is actually up 3 pounds since her visit in March.  She has 2-3+ lower extremity edema concentrated in her feet and ankles.  She is very sedentary.  She has continuous and unchanged shortness of breath.  She has chronic fatigue at this point.  She states her blood pressures can be as low as 107 systolic to as high as 150 systolic.  She states her diastolic is normally in the 70s.  She states she has really been bothered with a UTI for the last month.  She was on 2 rounds of antibiotics.  She just feels weak and tired.     Past Medical History:   Diagnosis Date   • Carotid artery stenosis    • Chronic renal insufficiency    • Compression fracture of lumbar vertebra (CMS/[facility])    • Difficulty breathing    • Fatigue    • H/O echocardiogram 05/20/2015   • Health care maintenance    • History of EKG 09/25/2015   • Hyperlipidemia    • Hypertension    • Hypothyroidism    • PAF (paroxysmal atrial fibrillation) (CMS/HCC)    • Stroke (CMS/[facility])    • Transfusion  history 07/2019       Past Surgical History:   Procedure Laterality Date   • CATARACT EXTRACTION     • CHOLECYSTECTOMY     • HERNIA REPAIR         Social History     Socioeconomic History   • Marital status:      Spouse name: Not on file   • Number of children: Not on file   • Years of education: Not on file   • Highest education level: Not on file   Tobacco Use   • Smoking status: Never Smoker   • Smokeless tobacco: Never Used   Substance and Sexual Activity   • Alcohol use: No     Comment: caffeine use: 2 cups daily.    • Drug use: No   • Sexual activity: Defer       Family History   Problem Relation Age of Onset   • Heart failure Mother        The following portion of the patient's history were reviewed and updated as appropriate: past medical history, past surgical history, past social history, past family history, allergies, current medications, and problem list.    Review of Systems   Constitutional: Positive for malaise/fatigue. Negative for diaphoresis and fever.   HENT: Negative for congestion, hearing loss, hoarse voice, nosebleeds and sore throat.    Eyes: Negative for photophobia, vision loss in left eye, vision loss in right eye and visual disturbance.   Cardiovascular: Positive for leg swelling. Negative for chest pain, dyspnea on exertion, irregular heartbeat, near-syncope, orthopnea, palpitations, paroxysmal nocturnal dyspnea and syncope.   Respiratory: Positive for shortness of breath (unchanged). Negative for cough, hemoptysis, sleep disturbances due to breathing, snoring, sputum production and wheezing.    Endocrine: Negative for cold intolerance, heat intolerance, polydipsia, polyphagia and polyuria.   Hematologic/Lymphatic: Negative for bleeding problem. Does not bruise/bleed easily.   Skin: Negative for color change, dry skin, poor wound healing, rash and suspicious lesions.   Musculoskeletal: Negative for arthritis, back pain, falls, gout, joint pain, joint swelling, muscle cramps,  muscle weakness and myalgias.   Gastrointestinal: Negative for bloating, abdominal pain, constipation, diarrhea, dysphagia, melena, nausea and vomiting.   Neurological: Positive for dizziness (upon wakening). Negative for excessive daytime sleepiness, headaches, light-headedness, loss of balance, numbness, paresthesias, seizures, vertigo and weakness.   Psychiatric/Behavioral: Negative for depression, memory loss and substance abuse. The patient is not nervous/anxious.        Allergies   Allergen Reactions   • Azithromycin    • Levofloxacin    • Losartan    • Losartan Potassium    • Macrodantin [Nitrofurantoin] Rash         Current Outpatient Medications:   •  amLODIPine (NORVASC) 2.5 MG tablet, Take 1 tablet by mouth Daily., Disp: 90 tablet, Rfl: 3  •  amoxicillin-clavulanate (AUGMENTIN) 250-125 MG per tablet, Take 1 tablet by mouth Daily., Disp: , Rfl:   •  B Complex Vitamins (VITAMIN B COMPLEX) tablet, Take 1 tablet by mouth Daily., Disp: , Rfl:   •  carvedilol (COREG) 6.25 MG tablet, Take 1 tablet by mouth 2 (Two) Times a Day With Meals., Disp: 180 tablet, Rfl: 3  •  Cholecalciferol (VITAMIN D PO), Take 1 tablet by mouth Daily., Disp: , Rfl:   •  famotidine (PEPCID) 40 MG tablet, Take 40 mg by mouth Daily., Disp: , Rfl:   •  ferrous sulfate 325 (65 FE) MG tablet, Take 325 mg by mouth., Disp: , Rfl:   •  furosemide (LASIX) 40 MG tablet, Take 40 mg by mouth Daily., Disp: , Rfl:   •  ipratropium (ATROVENT) 0.06 % nasal spray, 2 sprays into the nostril(s) as directed by provider 4 (Four) Times a Day., Disp: , Rfl:   •  isosorbide mononitrate (IMDUR) 30 MG 24 hr tablet, TAKE ONE TABLET BY MOUTH EVERY MORNING, Disp: 90 tablet, Rfl: 3  •  levothyroxine (SYNTHROID, LEVOTHROID) 75 MCG tablet, Take 75 mcg by mouth Daily., Disp: , Rfl:   •  miconazole (MICOTIN) 2 % powder, Apply  topically to the appropriate area as directed As Needed., Disp: , Rfl:   •  rivaroxaban (XARELTO) 15 MG tablet, Take 1 tablet by mouth Daily.,  "Disp: 30 tablet, Rfl: 6  •  simvastatin (ZOCOR) 40 MG tablet, Every Night., Disp: , Rfl:         Objective:     Vitals:    06/04/21 1503   BP: 146/80   Pulse: 70   Resp: 16   SpO2: 97%   Weight: 68.5 kg (151 lb)   Height: 152.4 cm (60\")     Body mass index is 29.49 kg/m².      Vitals reviewed.   Constitutional:       General: Not in acute distress.     Appearance: Well-developed. Frail. Chronically ill-appearing.   Eyes:      General:         Right eye: No discharge.         Left eye: No discharge.      Conjunctiva/sclera: Conjunctivae normal.   HENT:      Head: Normocephalic and atraumatic.      Right Ear: External ear normal.      Left Ear: External ear normal.      Nose: Nose normal.   Neck:      Thyroid: No thyromegaly.      Vascular: No JVD.      Trachea: No tracheal deviation.      Lymphadenopathy: No cervical adenopathy.   Pulmonary:      Effort: Pulmonary effort is normal. No respiratory distress.      Breath sounds: Normal breath sounds. No wheezing. No rales.   Chest:      Chest wall: Not tender to palpatation.   Cardiovascular:      Normal rate. Regular rhythm.      No gallop.   Pulses:     Intact distal pulses.   Edema:     Peripheral edema present.     Ankle: bilateral 2+ edema of the ankle.     Feet: bilateral 2+ edema of the feet.  Abdominal:      General: There is no distension.      Palpations: Abdomen is soft.      Tenderness: There is no abdominal tenderness.   Musculoskeletal: Normal range of motion.         General: No tenderness or deformity.      Cervical back: Normal range of motion and neck supple. Skin:     General: Skin is warm and dry.      Findings: No erythema or rash.   Neurological:      Mental Status: Alert and oriented to person, place, and time.      Coordination: Coordination normal.   Psychiatric:         Attention and Perception: Attention normal.         Mood and Affect: Mood normal.         Speech: Speech normal.         Behavior: Behavior normal.         Thought Content: " Thought content normal.         Cognition and Memory: Cognition normal.         Judgment: Judgment normal.               ECG 12 Lead    Date/Time: 6/4/2021 4:25 PM  Performed by: Kim Christianson APRN  Authorized by: Kim Christianson APRN   Comparison: compared with previous ECG from 3/5/2021  Similar to previous ECG  Rhythm: sinus rhythm  Rate: normal  Conduction: conduction normal  ST Segments: ST segments normal  T Waves: T waves normal  QRS axis: normal    Clinical impression: normal ECG          Physical Exam  Vitals reviewed.   Constitutional:       General: She is not in acute distress.     Appearance: She is well-developed. She is chronically ill-appearing.   HENT:      Head: Normocephalic and atraumatic.      Right Ear: External ear normal.      Left Ear: External ear normal.      Nose: Nose normal.   Eyes:      General:         Right eye: No discharge.         Left eye: No discharge.      Conjunctiva/sclera: Conjunctivae normal.   Neck:      Thyroid: No thyromegaly.      Vascular: No JVD.      Trachea: No tracheal deviation.   Cardiovascular:      Rate and Rhythm: Normal rate and regular rhythm.      Pulses: Intact distal pulses.      Heart sounds: No murmur heard.   No gallop.    Pulmonary:      Effort: Pulmonary effort is normal. No respiratory distress.      Breath sounds: Normal breath sounds. No wheezing or rales.   Chest:      Chest wall: No tenderness.   Abdominal:      General: There is no distension.      Palpations: Abdomen is soft.      Tenderness: There is no abdominal tenderness.   Musculoskeletal:         General: Edema present. No tenderness or deformity. Normal range of motion.      Cervical back: Normal range of motion and neck supple.   Lymphadenopathy:      Cervical: No cervical adenopathy.   Skin:     General: Skin is warm and dry.      Findings: No erythema or rash.   Neurological:      Mental Status: She is alert and oriented to person, place, and time.      Coordination: Coordination  normal.   Psychiatric:         Attention and Perception: Attention normal.         Mood and Affect: Mood normal.         Speech: Speech normal.         Behavior: Behavior normal.         Thought Content: Thought content normal.         Cognition and Memory: Cognition normal.         Judgment: Judgment normal.            Assessment:       Diagnosis Plan   1. PAF (paroxysmal atrial fibrillation) (CMS/HCC)     2. Essential hypertension     3. Mixed hyperlipidemia            Plan:       1. Hypertension - stable/controlled  2. No PERLA, tested 2014.  3. Mild bilateral carotid stenosis. Stable.   4. Hyperlipidemia - continue on lipid lowering therapy currently on simvastatin  5. Back pain, stable.   6. CVA due to PAF 5/2017.  on xarelto 15mg daily with food.   7. Dyspnea on exertion with LE edema.  This is likely due to deconditioning, diastolic dysfunction and may be coronary ischemia.  She has gained 10 pounds this year with increasing LE edema.  Will increase lasix to 40 mg in the AM and 20 mg at noon for 3 days.  I have encouraged her to elevate her feet when sitting.  She has some support hose but has not been using them.  She will try to get a larger pair and wear them.  She will call on Tuesday with an update.  8. Syncope while on toilet after getting up from nap and taking lasix just prior to nap.  Likely was BP dropping but may need monitor.      Increase lasix to 40 mg in the AM and 20 mg at noon for 3 days and call on Tuesday.  Try support hose.    As always, it has been a pleasure to participate in your patient's care. Thank you.       Sincerely,       FOZIA Jones      Current Outpatient Medications:   •  amLODIPine (NORVASC) 2.5 MG tablet, Take 1 tablet by mouth Daily., Disp: 90 tablet, Rfl: 3  •  amoxicillin-clavulanate (AUGMENTIN) 250-125 MG per tablet, Take 1 tablet by mouth Daily., Disp: , Rfl:   •  B Complex Vitamins (VITAMIN B COMPLEX) tablet, Take 1 tablet by mouth Daily., Disp: , Rfl:   •   carvedilol (COREG) 6.25 MG tablet, Take 1 tablet by mouth 2 (Two) Times a Day With Meals., Disp: 60 tablet, Rfl: 6  •  Cholecalciferol (VITAMIN D PO), Take 1 tablet by mouth Daily., Disp: , Rfl:   •  famotidine (PEPCID) 40 MG tablet, Take 40 mg by mouth Daily., Disp: , Rfl:   •  ferrous sulfate 325 (65 FE) MG tablet, Take 325 mg by mouth., Disp: , Rfl:   •  furosemide (LASIX) 40 MG tablet, Take 40 mg by mouth Daily., Disp: , Rfl:   •  ipratropium (ATROVENT) 0.06 % nasal spray, 2 sprays into the nostril(s) as directed by provider 4 (Four) Times a Day., Disp: , Rfl:   •  isosorbide mononitrate (IMDUR) 30 MG 24 hr tablet, TAKE ONE TABLET BY MOUTH EVERY MORNING, Disp: 90 tablet, Rfl: 3  •  levothyroxine (SYNTHROID, LEVOTHROID) 75 MCG tablet, Take 75 mcg by mouth Daily., Disp: , Rfl:   •  miconazole (MICOTIN) 2 % powder, Apply  topically to the appropriate area as directed As Needed., Disp: , Rfl:   •  rivaroxaban (XARELTO) 15 MG tablet, Take 1 tablet by mouth Daily., Disp: 30 tablet, Rfl: 6  •  simvastatin (ZOCOR) 40 MG tablet, Every Night., Disp: , Rfl:     Dictated utilizing Dragon dictation

## 2021-06-08 ENCOUNTER — TELEPHONE (OUTPATIENT)
Dept: CARDIOLOGY | Facility: CLINIC | Age: 86
End: 2021-06-08

## 2021-06-08 NOTE — TELEPHONE ENCOUNTER
----- Message from FOZIA Mantilla sent at 6/8/2021  4:00 PM EDT -----  Great, have her go back to 20 mg BID and call in about 2 weeks with update.  ----- Message -----  From: Berkley Richard MA  Sent: 6/8/2021   3:55 PM EDT  To: FOZIA Mantilla    Pt states that pedal edema has improved since her appt. She states that there is still some mild edema present in both feet, the worse is slightly bigger than the left. But over all much better

## 2021-06-11 RX ORDER — RIVAROXABAN 15 MG/1
TABLET, FILM COATED ORAL
Qty: 90 TABLET | Refills: 3 | Status: SHIPPED | OUTPATIENT
Start: 2021-06-11 | End: 2022-10-20

## 2021-12-14 ENCOUNTER — OFFICE VISIT (OUTPATIENT)
Dept: CARDIOLOGY | Facility: CLINIC | Age: 86
End: 2021-12-14

## 2021-12-14 VITALS
OXYGEN SATURATION: 97 % | HEIGHT: 60 IN | RESPIRATION RATE: 16 BRPM | WEIGHT: 144 LBS | HEART RATE: 74 BPM | SYSTOLIC BLOOD PRESSURE: 140 MMHG | DIASTOLIC BLOOD PRESSURE: 88 MMHG | BODY MASS INDEX: 28.27 KG/M2

## 2021-12-14 DIAGNOSIS — I63.411 CEREBROVASCULAR ACCIDENT (CVA) DUE TO EMBOLISM OF RIGHT MIDDLE CEREBRAL ARTERY (HCC): ICD-10-CM

## 2021-12-14 DIAGNOSIS — I48.0 PAF (PAROXYSMAL ATRIAL FIBRILLATION) (HCC): ICD-10-CM

## 2021-12-14 DIAGNOSIS — I10 ESSENTIAL HYPERTENSION: Primary | ICD-10-CM

## 2021-12-14 DIAGNOSIS — E78.2 MIXED HYPERLIPIDEMIA: ICD-10-CM

## 2021-12-14 PROCEDURE — 99214 OFFICE O/P EST MOD 30 MIN: CPT | Performed by: NURSE PRACTITIONER

## 2021-12-14 PROCEDURE — 93000 ELECTROCARDIOGRAM COMPLETE: CPT | Performed by: NURSE PRACTITIONER

## 2021-12-14 NOTE — PROGRESS NOTES
Date of Office Visit: 2021  Encounter Provider: FOZIA Jones  Place of Service: [facility] CARDIOLOGY  Patient Name: Doris Coreas  :1928  Primary Cardiologist: Dr. Grier      CC:  6 month follow up    Dear Dr. Smith    HPI: Doris Coreas is a pleasant 89 y.o. female who presents 2021 for cardiac follow up.  I reviewed her past medical records including notes, labs and testing in preparation for today's visit.    She has hypertension and hyperlipidemia. She was on Losartan but apparently developed a rash with the Losartan and had to stop this.She was placed on amlodipine at 10 mg daily but then had a lot of ankle edema.       In 10/2010 she did have a stress nuclear perfusion study which was negative for any evidence of ischemia. This was performed at [facility].      She was admitted May 2017 with visual changes and left hand weakness.  She was found to have stroke and the MRI of her brain showed multiple strokes in the right middle cerebral artery territory.  The MRA showed no evidence of carotid stenosis.  She's no longer able to drive because of visual changes.  She was placed on Plavix at that time and no ADDI was done.  She did have lower extremity venous duplex studies done May 2017 showing no DVT.  She had an echocardiogram done May 2017 which showed normal ejection fraction 61%, normal saline contrast study and no significant valve disease.  She then wore Ziopatch which showed multiple bursts of paroxysmal atrial fibrillation.  This is likely the source of her stroke.  Her TSH was elevated at 7.7 during that admission.  Her Synthroid dosing was changed then. She was placed on xarelto.      She had an echocardiogram done 2019 showing ejection fraction 57% with grade 1A diastolic dysfunction and aortic valve calcification without stenosis.  His stress nuclear perfusion study in 2019 showing a small to medium sized area of  mild ischemia in the lateral wall.  This is been treated medically.      She had syncope and went to the ER 4/23/20.  She had been sleepy and took a lasix right before she laid down to take a nap.  She got from her nap and went to bathroom and passed out on the toilet and fell.  She could not get up and her daughter found her on the floor and called 911.  She had not been feeling ill prior to this and really did not have any warning.   Her labs showed normal troponin X2, K+ 3.6, glucose 128, sodium 135, otherwise normal CMP, normal CBC, normal UA. ECG and CXR were normal.         She cannot tolerate more than 30 mg of Imdur as it makes her dizzy.     When seen in March 2021, she had been having more lower extremity edema while taking the amlodipine 5 mg daily.  This was cut back to 2.5 mg daily.      I saw her in June 2021 in follow-up.  She stated she had not been taking the amlodipine at all.  She stated that she had gained 10 pounds over the course of the year.  When compared to our previous weights she was only up 3 pounds. She had 2-3+ lower extremity edema concentrated in her feet and ankles.  She is very sedentary.  She has continuous and unchanged shortness of breath.  She has chronic fatigue at this point.  She states her blood pressures can be as low as 107 systolic to as high as 150 systolic.  She states her diastolic is normally in the 70s.    She stated she had been bothered with the UTI for at least a month.  She had been on 2 rounds of antibiotics and just felt weak and tired.  I increased her lasix to 40 mg in the AM and 20 mg at noon for 3 days and had her call me in a couple of days.  I also encouraged support hose.    She is here today for 6-month follow-up. She has had another Birkey has now 89 years young. She continues to have lower extremity edema in her feet and ankles and lower legs. She states the increased dose of Lasix did not help any at all. She is down 8 pounds since June 2021. She  denies any palpitations, dizziness, chest pain or chest pressure. She states her shortness of breath is unchanged. The kyphosis certainly adds to this. She states she is fatigued with decreased energy which again is unchanged. She does a lot of setting, I encouraged her to prop her feet up. Her blood pressure is controlled. She did note that she had not taken her Lasix today because she does not take it on days she has to go out.  Past Medical History:   Diagnosis Date   • Carotid artery stenosis    • Chronic renal insufficiency    • Compression fracture of lumbar vertebra (HCC)    • Difficulty breathing    • Fatigue    • H/O echocardiogram 05/20/2015   • Health care maintenance    • History of EKG 09/25/2015   • Hyperlipidemia    • Hypertension    • Hypothyroidism    • PAF (paroxysmal atrial fibrillation) (HCC)    • Stroke (HCC)    • Transfusion history 07/2019       Past Surgical History:   Procedure Laterality Date   • CATARACT EXTRACTION     • CHOLECYSTECTOMY     • HERNIA REPAIR         Social History     Socioeconomic History   • Marital status:    Tobacco Use   • Smoking status: Never Smoker   • Smokeless tobacco: Never Used   Substance and Sexual Activity   • Alcohol use: No     Comment: caffeine use: 2 cups daily.    • Drug use: No   • Sexual activity: Defer       Family History   Problem Relation Age of Onset   • Heart failure Mother        The following portion of the patient's history were reviewed and updated as appropriate: past medical history, past surgical history, past social history, past family history, allergies, current medications, and problem list.    Review of Systems   Constitutional: Positive for malaise/fatigue. Negative for diaphoresis and fever.   HENT: Negative for congestion, hearing loss, hoarse voice, nosebleeds and sore throat.    Eyes: Negative for photophobia, vision loss in left eye, vision loss in right eye and visual disturbance.   Cardiovascular: Positive for leg  swelling. Negative for chest pain, dyspnea on exertion, irregular heartbeat, near-syncope, orthopnea, palpitations, paroxysmal nocturnal dyspnea and syncope.   Respiratory: Positive for shortness of breath (unchanged). Negative for cough, hemoptysis, sleep disturbances due to breathing, snoring, sputum production and wheezing.    Endocrine: Negative for cold intolerance, heat intolerance, polydipsia, polyphagia and polyuria.   Hematologic/Lymphatic: Negative for bleeding problem. Does not bruise/bleed easily.   Skin: Negative for color change, dry skin, poor wound healing, rash and suspicious lesions.   Musculoskeletal: Positive for arthritis and back pain. Negative for falls, gout, joint pain, joint swelling, muscle cramps, muscle weakness and myalgias.   Gastrointestinal: Negative for bloating, abdominal pain, constipation, diarrhea, dysphagia, melena, nausea and vomiting.   Neurological: Positive for weakness. Negative for excessive daytime sleepiness, dizziness, headaches, light-headedness, loss of balance, numbness, paresthesias, seizures and vertigo.   Psychiatric/Behavioral: Negative for depression, memory loss and substance abuse. The patient is not nervous/anxious.        Allergies   Allergen Reactions   • Azithromycin    • Levofloxacin    • Losartan    • Losartan Potassium    • Macrodantin [Nitrofurantoin] Rash         Current Outpatient Medications:   •  amLODIPine (NORVASC) 2.5 MG tablet, Take 1 tablet by mouth Daily., Disp: 90 tablet, Rfl: 3  •  amoxicillin-clavulanate (AUGMENTIN) 250-125 MG per tablet, Take 1 tablet by mouth Daily., Disp: , Rfl:   •  B Complex Vitamins (VITAMIN B COMPLEX) tablet, Take 1 tablet by mouth Daily., Disp: , Rfl:   •  carvedilol (COREG) 6.25 MG tablet, Take 1 tablet by mouth 2 (Two) Times a Day With Meals., Disp: 60 tablet, Rfl: 6  •  Cholecalciferol (VITAMIN D PO), Take 1 tablet by mouth Daily., Disp: , Rfl:   •  famotidine (PEPCID) 40 MG tablet, Take 40 mg by mouth Daily.,  "Disp: , Rfl:   •  furosemide (LASIX) 40 MG tablet, Take 40 mg by mouth Daily., Disp: , Rfl:   •  ipratropium (ATROVENT) 0.06 % nasal spray, 2 sprays into the nostril(s) as directed by provider 4 (Four) Times a Day., Disp: , Rfl:   •  isosorbide mononitrate (IMDUR) 30 MG 24 hr tablet, TAKE ONE TABLET BY MOUTH EVERY MORNING, Disp: 90 tablet, Rfl: 3  •  levothyroxine (SYNTHROID, LEVOTHROID) 75 MCG tablet, Take 75 mcg by mouth Daily., Disp: , Rfl:   •  miconazole (MICOTIN) 2 % powder, Apply  topically to the appropriate area as directed As Needed., Disp: , Rfl:   •  simvastatin (ZOCOR) 40 MG tablet, Every Night., Disp: , Rfl:   •  Xarelto 15 MG tablet, TAKE ONE TABLET BY MOUTH DAILY, Disp: 90 tablet, Rfl: 3        Objective:     Vitals:    12/14/21 1517   BP: 140/88   Pulse: 74   Resp: 16   SpO2: 97%   Weight: 65.3 kg (144 lb)   Height: 152.4 cm (60\")     Body mass index is 28.12 kg/m².      Vitals reviewed.   Constitutional:       General: Not in acute distress.     Appearance: Frail.   Eyes:      General:         Right eye: No discharge.         Left eye: No discharge.      Conjunctiva/sclera: Conjunctivae normal.   HENT:      Head: Normocephalic and atraumatic.      Right Ear: External ear normal.      Left Ear: External ear normal.      Nose: Nose normal.   Neck:      Thyroid: No thyromegaly.      Vascular: No JVD.      Trachea: No tracheal deviation.      Lymphadenopathy: No cervical adenopathy.   Pulmonary:      Effort: Pulmonary effort is normal. No respiratory distress.      Breath sounds: Normal breath sounds. No wheezing. No rales.   Chest:      Chest wall: Not tender to palpatation.   Cardiovascular:      Normal rate. Regular rhythm.      Murmurs: There is a grade 2/6 systolic murmur.      No gallop.   Pulses:     Intact distal pulses.   Edema:     Peripheral edema present.     Ankle: bilateral 1+ edema of the ankle.     Feet: bilateral 1+ edema of the feet.  Abdominal:      General: There is no distension. "      Palpations: Abdomen is soft.      Tenderness: There is no abdominal tenderness.   Musculoskeletal:         General: Deformity (kyphosis) present. No tenderness.      Cervical back: Neck supple. Decreased range of motion.      Thoracic back: Decreased range of motion.      Comments: kyphosis Skin:     General: Skin is warm and dry.      Findings: No erythema or rash.   Neurological:      Mental Status: Alert and oriented to person, place, and time.      Coordination: Coordination normal.   Psychiatric:         Attention and Perception: Attention normal.         Mood and Affect: Mood normal.         Speech: Speech normal.         Behavior: Behavior normal.         Thought Content: Thought content normal.         Cognition and Memory: Cognition normal.         Judgment: Judgment normal.               ECG 12 Lead    Date/Time: 12/14/2021 3:23 PM  Performed by: Kim Christianson APRN  Authorized by: Kim Christianson APRN   Comparison: compared with previous ECG from 6/4/2021  Similar to previous ECG  Rhythm: sinus rhythm  Rate: normal  Conduction: conduction normal  ST Segments: ST segments normal  T inversion: V2  T flattening: V3  QRS axis: normal    Clinical impression: non-specific ECG              Assessment:       Diagnosis Plan   1. Essential hypertension     2. PAF (paroxysmal atrial fibrillation) (HCC)     3. Mixed hyperlipidemia     4. Cerebrovascular accident (CVA) due to embolism of right middle cerebral artery (HCC)            Plan:       1. Hypertension -stable  2. No PERLA, tested 2014.  3. Mild bilateral carotid stenosis. Stable  4. Hyperlipidemia -continue on lipid-lowering therapy, she is currently on simvastatin  5. Back pain, stable. Severe kyphosis  6. CVA due to PAF 5/2017.  on xarelto 15mg daily with food.   7. Dyspnea on exertion with LE edema.  This is likely due to deconditioning, diastolic dysfunction and her severe kyphosis. She states it is the same and unchanged.    No changes, RTO in 6 months  with RM       As always, it has been a pleasure to participate in your patient's care. Thank you.       Sincerely,       FOZIA Jones      Current Outpatient Medications:   •  amLODIPine (NORVASC) 2.5 MG tablet, Take 1 tablet by mouth Daily., Disp: 90 tablet, Rfl: 3  •  amoxicillin-clavulanate (AUGMENTIN) 250-125 MG per tablet, Take 1 tablet by mouth Daily., Disp: , Rfl:   •  B Complex Vitamins (VITAMIN B COMPLEX) tablet, Take 1 tablet by mouth Daily., Disp: , Rfl:   •  carvedilol (COREG) 6.25 MG tablet, Take 1 tablet by mouth 2 (Two) Times a Day With Meals., Disp: 60 tablet, Rfl: 6  •  Cholecalciferol (VITAMIN D PO), Take 1 tablet by mouth Daily., Disp: , Rfl:   •  famotidine (PEPCID) 40 MG tablet, Take 40 mg by mouth Daily., Disp: , Rfl:   •  furosemide (LASIX) 40 MG tablet, Take 40 mg by mouth Daily., Disp: , Rfl:   •  ipratropium (ATROVENT) 0.06 % nasal spray, 2 sprays into the nostril(s) as directed by provider 4 (Four) Times a Day., Disp: , Rfl:   •  isosorbide mononitrate (IMDUR) 30 MG 24 hr tablet, TAKE ONE TABLET BY MOUTH EVERY MORNING, Disp: 90 tablet, Rfl: 3  •  levothyroxine (SYNTHROID, LEVOTHROID) 75 MCG tablet, Take 75 mcg by mouth Daily., Disp: , Rfl:   •  miconazole (MICOTIN) 2 % powder, Apply  topically to the appropriate area as directed As Needed., Disp: , Rfl:   •  simvastatin (ZOCOR) 40 MG tablet, Every Night., Disp: , Rfl:   •  Xarelto 15 MG tablet, TAKE ONE TABLET BY MOUTH DAILY, Disp: 90 tablet, Rfl: 3      Dictated utilizing Dragon dictation

## 2022-06-03 RX ORDER — ISOSORBIDE MONONITRATE 30 MG/1
TABLET, EXTENDED RELEASE ORAL
Qty: 90 TABLET | Refills: 3 | Status: SHIPPED | OUTPATIENT
Start: 2022-06-03

## 2022-06-28 ENCOUNTER — OFFICE VISIT (OUTPATIENT)
Dept: CARDIOLOGY | Facility: CLINIC | Age: 87
End: 2022-06-28

## 2022-06-28 VITALS
RESPIRATION RATE: 16 BRPM | WEIGHT: 144 LBS | SYSTOLIC BLOOD PRESSURE: 140 MMHG | OXYGEN SATURATION: 96 % | HEIGHT: 60 IN | BODY MASS INDEX: 28.27 KG/M2 | DIASTOLIC BLOOD PRESSURE: 100 MMHG | HEART RATE: 69 BPM

## 2022-06-28 DIAGNOSIS — I48.0 PAF (PAROXYSMAL ATRIAL FIBRILLATION): Primary | ICD-10-CM

## 2022-06-28 DIAGNOSIS — R01.1 HEART MURMUR: ICD-10-CM

## 2022-06-28 DIAGNOSIS — I63.411 CEREBROVASCULAR ACCIDENT (CVA) DUE TO EMBOLISM OF RIGHT MIDDLE CEREBRAL ARTERY: ICD-10-CM

## 2022-06-28 DIAGNOSIS — I10 ESSENTIAL HYPERTENSION: ICD-10-CM

## 2022-06-28 DIAGNOSIS — E78.2 MIXED HYPERLIPIDEMIA: ICD-10-CM

## 2022-06-28 PROCEDURE — 99214 OFFICE O/P EST MOD 30 MIN: CPT | Performed by: NURSE PRACTITIONER

## 2022-06-28 PROCEDURE — 93000 ELECTROCARDIOGRAM COMPLETE: CPT | Performed by: NURSE PRACTITIONER

## 2022-06-28 NOTE — PROGRESS NOTES
Date of Office Visit: 2022  Encounter Provider: FOZIA Jones  Place of Service: [facility] CARDIOLOGY  Patient Name: Doris Coreas  :1928  Primary Cardiologist: Dr. Grier    CC:  6 month follow up    Dear Dr. Smith    HPI: Doris Coreas is a pleasant 89 y.o. female who presents 2022 for cardiac follow up. I have reviewed her past medical records including notes, labs and testing in preparation for today's visit.    She has hypertension and hyperlipidemia. She was on Losartan but apparently developed a rash with the Losartan and had to stop this.She was placed on amlodipine at 10 mg daily but then had a lot of ankle edema.       In 10/2010 she did have a stress nuclear perfusion study which was negative for any evidence of ischemia. This was performed at [facility].      She was admitted May 2017 with visual changes and left hand weakness.  She was found to have stroke and the MRI of her brain showed multiple strokes in the right middle cerebral artery territory.  The MRA showed no evidence of carotid stenosis.  She's no longer able to drive because of visual changes.  She was placed on Plavix at that time and no ADDI was done.  She did have lower extremity venous duplex studies done May 2017 showing no DVT.  She had an echocardiogram done May 2017 which showed normal ejection fraction 61%, normal saline contrast study and no significant valve disease.  She then wore Ziopatch which showed multiple bursts of paroxysmal atrial fibrillation.  This is likely the source of her stroke.  Her TSH was elevated at 7.7 during that admission.  Her Synthroid dosing was changed then. She was placed on xarelto.      She had an echocardiogram done 2019 showing ejection fraction 57% with grade 1A diastolic dysfunction and aortic valve calcification without stenosis.  His stress nuclear perfusion study in 2019 showing a small to medium sized area of  mild ischemia in the lateral wall.  This is been treated medically.      She had syncope and went to the ER 4/23/20.  She had been sleepy and took a lasix right before she laid down to take a nap.  She got from her nap and went to bathroom and passed out on the toilet and fell.  She could not get up and her daughter found her on the floor and called 911.  She had not been feeling ill prior to this and really did not have any warning.   Her labs showed normal troponin X2, K+ 3.6, glucose 128, sodium 135, otherwise normal CMP, normal CBC, normal UA. ECG and CXR were normal.         She cannot tolerate more than 30 mg of Imdur as it makes her dizzy.     When seen in March 2021, she had been having more lower extremity edema while taking the amlodipine 5 mg daily.  This was cut back to 2.5 mg daily.      I saw her in June 2021 in follow-up.  She stated she had not been taking the amlodipine at all.  She stated that she had gained 10 pounds over the course of the year.  When compared to our previous weights she was only up 3 pounds. She had 2-3+ lower extremity edema concentrated in her feet and ankles.  She is very sedentary.  She has continuous and unchanged shortness of breath.  She has chronic fatigue at this point.  She states her blood pressures can be as low as 107 systolic to as high as 150 systolic.  She states her diastolic is normally in the 70s.    She stated she had been bothered with the UTI for at least a month.  She had been on 2 rounds of antibiotics and just felt weak and tired.  I increased her lasix to 40 mg in the AM and 20 mg at noon for 3 days and had her call me in a couple of days.  I also encouraged support hose.     I saw her 12/2021  for 6-month follow-up. . She continues to have lower extremity edema in her feet and ankles and lower legs. She states the increased dose of Lasix did not help any at all. She is down 8 pounds since June 2021. She denies any palpitations, dizziness, chest pain or  chest pressure. She states her shortness of breath is unchanged. The kyphosis certainly adds to this. She states she is fatigued with decreased energy which again is unchanged. She does a lot of setting, I encouraged her to prop her feet up. Her blood pressure is controlled. She did note that she had not taken her Lasix today because she does not take it on days she has to go out.    She returns today for 6-month follow-up.  She states she has good days and bad days.  She is not feeling well today because she has another UTI and is upset over the fact that she has recurrent UTIs.  She states she cannot control her urine and is incontinent most of the time.  She states that you were thinking of sending her to urology.  She still has lower extremity edema that is unchanged.  She feels her fatigue is worse.  She denies any palpitations, shortness of breath, dizziness or lightheaded.  She has not had any syncope or presyncopal episodes.  She denies any chest pain or chest pressure.  I did recheck her blood pressure, 146/64.  She take her medications as directed except for the Lasix, she only takes that a couple of times a week because she is incontinent and it bothers her so.  Past Medical History:   Diagnosis Date   • Carotid artery stenosis    • Chronic renal insufficiency    • Compression fracture of lumbar vertebra (HCC)    • Difficulty breathing    • Fatigue    • H/O echocardiogram 05/20/2015   • Health care maintenance    • History of EKG 09/25/2015   • Hyperlipidemia    • Hypertension    • Hypothyroidism    • PAF (paroxysmal atrial fibrillation) (HCC)    • Stroke (HCC)    • Transfusion history 07/2019       Past Surgical History:   Procedure Laterality Date   • CATARACT EXTRACTION     • CHOLECYSTECTOMY     • HERNIA REPAIR         Social History     Socioeconomic History   • Marital status:    Tobacco Use   • Smoking status: Never Smoker   • Smokeless tobacco: Never Used   Substance and Sexual Activity   •  Alcohol use: No     Comment: caffeine use: 2 cups daily.    • Drug use: No   • Sexual activity: Defer       Family History   Problem Relation Age of Onset   • Heart failure Mother        The following portion of the patient's history were reviewed and updated as appropriate: past medical history, past surgical history, past social history, past family history, allergies, current medications, and problem list.    Review of Systems   Constitutional: Positive for malaise/fatigue. Negative for diaphoresis and fever.   HENT: Negative for congestion, hearing loss, hoarse voice, nosebleeds and sore throat.    Eyes: Negative for photophobia, vision loss in left eye, vision loss in right eye and visual disturbance.   Cardiovascular: Positive for leg swelling. Negative for chest pain, dyspnea on exertion, irregular heartbeat, near-syncope, orthopnea, palpitations, paroxysmal nocturnal dyspnea and syncope.   Respiratory: Negative for cough, hemoptysis, shortness of breath, sleep disturbances due to breathing, snoring, sputum production and wheezing.    Endocrine: Negative for cold intolerance, heat intolerance, polydipsia, polyphagia and polyuria.   Hematologic/Lymphatic: Negative for bleeding problem. Does not bruise/bleed easily.   Skin: Negative for color change, dry skin, poor wound healing, rash and suspicious lesions.   Musculoskeletal: Negative for arthritis, back pain, falls, gout, joint pain, joint swelling, muscle cramps, muscle weakness and myalgias.   Gastrointestinal: Negative for bloating, abdominal pain, constipation, diarrhea, dysphagia, melena, nausea and vomiting.   Neurological: Positive for weakness. Negative for excessive daytime sleepiness, dizziness, headaches, light-headedness, loss of balance, numbness, paresthesias, seizures and vertigo.   Psychiatric/Behavioral: Negative for depression, memory loss and substance abuse. The patient is not nervous/anxious.        Allergies   Allergen Reactions   •  "Azithromycin    • Levofloxacin    • Losartan    • Losartan Potassium    • Macrodantin [Nitrofurantoin] Rash         Current Outpatient Medications:   •  amLODIPine (NORVASC) 2.5 MG tablet, Take 1 tablet by mouth Daily., Disp: 90 tablet, Rfl: 3  •  amoxicillin-clavulanate (AUGMENTIN) 250-125 MG per tablet, Take 1 tablet by mouth Daily., Disp: , Rfl:   •  B Complex Vitamins (VITAMIN B COMPLEX) tablet, Take 1 tablet by mouth Daily., Disp: , Rfl:   •  carvedilol (COREG) 6.25 MG tablet, Take 1 tablet by mouth 2 (Two) Times a Day With Meals., Disp: 60 tablet, Rfl: 6  •  Cholecalciferol (VITAMIN D PO), Take 1 tablet by mouth Daily., Disp: , Rfl:   •  famotidine (PEPCID) 40 MG tablet, Take 40 mg by mouth Daily., Disp: , Rfl:   •  furosemide (LASIX) 40 MG tablet, Take 40 mg by mouth Daily., Disp: , Rfl:   •  ipratropium (ATROVENT) 0.06 % nasal spray, 2 sprays into the nostril(s) as directed by provider 4 (Four) Times a Day., Disp: , Rfl:   •  isosorbide mononitrate (IMDUR) 30 MG 24 hr tablet, TAKE ONE TABLET BY MOUTH EVERY MORNING, Disp: 90 tablet, Rfl: 3  •  levothyroxine (SYNTHROID, LEVOTHROID) 75 MCG tablet, Take 75 mcg by mouth Daily., Disp: , Rfl:   •  miconazole (MICOTIN) 2 % powder, Apply  topically to the appropriate area as directed As Needed., Disp: , Rfl:   •  simvastatin (ZOCOR) 40 MG tablet, Every Night., Disp: , Rfl:   •  Xarelto 15 MG tablet, TAKE ONE TABLET BY MOUTH DAILY, Disp: 90 tablet, Rfl: 3        Objective:     Vitals:    06/28/22 1501   BP: 140/100   Pulse: 69   Resp: 16   SpO2: 96%   Weight: 65.3 kg (144 lb)   Height: 152.4 cm (60\")     Body mass index is 28.12 kg/m².      Vitals reviewed.   Constitutional:       General: Not in acute distress.     Appearance: Well-developed and not in distress.   Eyes:      General:         Right eye: No discharge.         Left eye: No discharge.      Conjunctiva/sclera: Conjunctivae normal.   HENT:      Head: Normocephalic and atraumatic.      Right Ear: External " ear normal.      Left Ear: External ear normal.      Nose: Nose normal.   Neck:      Thyroid: No thyromegaly.      Vascular: No JVD.      Trachea: No tracheal deviation.      Lymphadenopathy: No cervical adenopathy.   Pulmonary:      Effort: Pulmonary effort is normal. No respiratory distress.      Breath sounds: Normal breath sounds. No wheezing. No rales.   Chest:      Chest wall: Not tender to palpatation.   Cardiovascular:      Normal rate. Regular rhythm.      Murmurs: There is a systolic murmur.      No gallop.   Pulses:     Intact distal pulses.   Edema:     Peripheral edema present.     Pretibial: bilateral 2+ edema of the pretibial area.     Ankle: bilateral 2+ edema of the ankle.     Feet: bilateral 2+ edema of the feet.  Abdominal:      General: There is no distension.      Palpations: Abdomen is soft.      Tenderness: There is no abdominal tenderness.   Musculoskeletal: Normal range of motion.         General: No tenderness or deformity.      Cervical back: Normal range of motion and neck supple. Skin:     General: Skin is warm and dry.      Findings: No erythema or rash.   Neurological:      Mental Status: Alert and oriented to person, place, and time.      Coordination: Coordination normal.   Psychiatric:         Attention and Perception: Attention normal.         Mood and Affect: Mood normal.         Speech: Speech normal.         Behavior: Behavior normal. Behavior is cooperative.         Thought Content: Thought content normal.         Cognition and Memory: Cognition normal.         Judgment: Judgment normal.               ECG 12 Lead    Date/Time: 6/28/2022 3:09 PM  Performed by: Kim Christianson APRN  Authorized by: Kim Christianson APRN   Comparison: compared with previous ECG from 12/14/2021  Similar to previous ECG  Rhythm: sinus rhythm  Rate: normal  Conduction: conduction normal  ST Segments: ST segments normal  T Waves: T waves normal  QRS axis: normal    Clinical impression: normal  ECG              Assessment:       Diagnosis Plan   1. PAF (paroxysmal atrial fibrillation) (HCC)     2. Essential hypertension     3. Mixed hyperlipidemia     4. Cerebrovascular accident (CVA) due to embolism of right middle cerebral artery (HCC)     5. Heart murmur  Adult Transthoracic Echo Complete W/ Cont if Necessary Per Protocol          Plan:           1. Hypertension -stable-recheck 146/64.  2. No PERLA, tested 2014.  3. Mild bilateral carotid stenosis. Stable  4. Hyperlipidemia -continue lipid-lowering therapy.  She remains on simvastatin 40 mg daily.  5. Back pain, stable. Severe kyphosis  6. CVA due to PAF 5/2017.  on xarelto 15mg daily with food.   7. Dyspnea on exertion with LE edema.  This is likely due to deconditioning, diastolic dysfunction and her severe kyphosis.  She states this is unchanged but she is more fatigued.  8.  Heart murmur-sounds more pronounced.  Going to check an echo to help guide medical therapy.  She really is not tolerating Lasix as she is very incontinent and this just makes it worse.  She is only taking Lasix twice a week at the most.     Check echo   RTO in 6 months with RM      As always, it has been a pleasure to participate in your patient's care. Thank you.       Sincerely,       FOZIA Jones      Current Outpatient Medications:   •  amLODIPine (NORVASC) 2.5 MG tablet, Take 1 tablet by mouth Daily., Disp: 90 tablet, Rfl: 3  •  amoxicillin-clavulanate (AUGMENTIN) 250-125 MG per tablet, Take 1 tablet by mouth Daily., Disp: , Rfl:   •  B Complex Vitamins (VITAMIN B COMPLEX) tablet, Take 1 tablet by mouth Daily., Disp: , Rfl:   •  carvedilol (COREG) 6.25 MG tablet, Take 1 tablet by mouth 2 (Two) Times a Day With Meals., Disp: 60 tablet, Rfl: 6  •  Cholecalciferol (VITAMIN D PO), Take 1 tablet by mouth Daily., Disp: , Rfl:   •  famotidine (PEPCID) 40 MG tablet, Take 40 mg by mouth Daily., Disp: , Rfl:   •  furosemide (LASIX) 40 MG tablet, Take 40 mg by mouth Daily., Disp: ,  Rfl:   •  ipratropium (ATROVENT) 0.06 % nasal spray, 2 sprays into the nostril(s) as directed by provider 4 (Four) Times a Day., Disp: , Rfl:   •  isosorbide mononitrate (IMDUR) 30 MG 24 hr tablet, TAKE ONE TABLET BY MOUTH EVERY MORNING, Disp: 90 tablet, Rfl: 3  •  levothyroxine (SYNTHROID, LEVOTHROID) 75 MCG tablet, Take 75 mcg by mouth Daily., Disp: , Rfl:   •  miconazole (MICOTIN) 2 % powder, Apply  topically to the appropriate area as directed As Needed., Disp: , Rfl:   •  simvastatin (ZOCOR) 40 MG tablet, Every Night., Disp: , Rfl:   •  Xarelto 15 MG tablet, TAKE ONE TABLET BY MOUTH DAILY, Disp: 90 tablet, Rfl: 3    Dictated utilizing Dragon dictation

## 2022-07-05 ENCOUNTER — HOSPITAL ENCOUNTER (OUTPATIENT)
Dept: CARDIOLOGY | Facility: HOSPITAL | Age: 87
Discharge: HOME | End: 2022-07-05
Admitting: NURSE PRACTITIONER

## 2022-07-05 VITALS
WEIGHT: 144 LBS | SYSTOLIC BLOOD PRESSURE: 140 MMHG | HEIGHT: 60 IN | DIASTOLIC BLOOD PRESSURE: 92 MMHG | BODY MASS INDEX: 28.27 KG/M2

## 2022-07-05 DIAGNOSIS — R01.1 HEART MURMUR: ICD-10-CM

## 2022-07-05 LAB
AORTIC DIMENSIONLESS INDEX: 0.6 (DI)
BH CV ECHO MEAS - AO MAX PG: 15.5 MMHG
BH CV ECHO MEAS - AO MEAN PG: 9 MMHG
BH CV ECHO MEAS - AO V2 MAX: 197 CM/SEC
BH CV ECHO MEAS - AO V2 VTI: 39.5 CM
BH CV ECHO MEAS - AVA(I,D): 1.63 CM2
BH CV ECHO MEAS - EDV(CUBED): 91.1 ML
BH CV ECHO MEAS - EDV(MOD-SP2): 40 ML
BH CV ECHO MEAS - EDV(MOD-SP4): 35 ML
BH CV ECHO MEAS - EF(MOD-BP): 70.4 %
BH CV ECHO MEAS - EF(MOD-SP2): 67.5 %
BH CV ECHO MEAS - EF(MOD-SP4): 68.6 %
BH CV ECHO MEAS - ESV(CUBED): 31.1 ML
BH CV ECHO MEAS - ESV(MOD-SP2): 13 ML
BH CV ECHO MEAS - ESV(MOD-SP4): 11 ML
BH CV ECHO MEAS - FS: 30.1 %
BH CV ECHO MEAS - IVS/LVPW: 1.13 CM
BH CV ECHO MEAS - IVSD: 0.9 CM
BH CV ECHO MEAS - LAT PEAK E' VEL: 5.8 CM/SEC
BH CV ECHO MEAS - LV DIASTOLIC VOL/BSA (35-75): 21.6 CM2
BH CV ECHO MEAS - LV MASS(C)D: 123.1 GRAMS
BH CV ECHO MEAS - LV MAX PG: 2.8 MMHG
BH CV ECHO MEAS - LV MEAN PG: 2 MMHG
BH CV ECHO MEAS - LV SYSTOLIC VOL/BSA (12-30): 6.8 CM2
BH CV ECHO MEAS - LV V1 MAX: 83.3 CM/SEC
BH CV ECHO MEAS - LV V1 VTI: 25.6 CM
BH CV ECHO MEAS - LVIDD: 4.5 CM
BH CV ECHO MEAS - LVIDS: 3.1 CM
BH CV ECHO MEAS - LVOT AREA: 2.5 CM2
BH CV ECHO MEAS - LVOT DIAM: 1.79 CM
BH CV ECHO MEAS - LVPWD: 0.8 CM
BH CV ECHO MEAS - MED PEAK E' VEL: 6.1 CM/SEC
BH CV ECHO MEAS - MR MAX PG: 82.4 MMHG
BH CV ECHO MEAS - MR MAX VEL: 454 CM/SEC
BH CV ECHO MEAS - MV DEC SLOPE: 455.6 CM/SEC2
BH CV ECHO MEAS - MV MAX PG: 11.1 MMHG
BH CV ECHO MEAS - MV MEAN PG: 4.6 MMHG
BH CV ECHO MEAS - MV P1/2T: 82.5 MSEC
BH CV ECHO MEAS - MV V2 VTI: 39 CM
BH CV ECHO MEAS - MVA(P1/2T): 2.7 CM2
BH CV ECHO MEAS - MVA(VTI): 1.65 CM2
BH CV ECHO MEAS - RAP SYSTOLE: 3 MMHG
BH CV ECHO MEAS - SI(MOD-SP2): 16.6 ML/M2
BH CV ECHO MEAS - SI(MOD-SP4): 14.8 ML/M2
BH CV ECHO MEAS - SV(LVOT): 64.5 ML
BH CV ECHO MEAS - SV(MOD-SP2): 27 ML
BH CV ECHO MEAS - SV(MOD-SP4): 24 ML
BH CV XLRA - TDI S': 8.4 CM/SEC
LEFT ATRIUM VOLUME INDEX: 14.3 ML/M2
MAXIMAL PREDICTED HEART RATE: 127 BPM
SINUS: 3 CM
STRESS TARGET HR: 108 BPM

## 2022-07-05 PROCEDURE — 93306 TTE W/DOPPLER COMPLETE: CPT

## 2022-07-05 PROCEDURE — 93306 TTE W/DOPPLER COMPLETE: CPT | Performed by: INTERNAL MEDICINE

## 2022-07-06 NOTE — PROGRESS NOTES
Please call.  Still has mild AV and MV regurgitation that is basically the same.  No change in therapy at this time.

## 2022-07-07 ENCOUNTER — TELEPHONE (OUTPATIENT)
Dept: CARDIOLOGY | Facility: CLINIC | Age: 87
End: 2022-07-07

## 2022-07-07 NOTE — TELEPHONE ENCOUNTER
Called and left VM. Will continue to try to reach patient.     Sonya Garcia RN  [place] [facility] [place]

## 2022-07-07 NOTE — TELEPHONE ENCOUNTER
----- Message from FOZIA Mantilla sent at 7/6/2022  5:00 PM EDT -----  Please call.  Still has mild AV and MV regurgitation that is basically the same.  No change in therapy at this time.

## 2022-07-08 NOTE — TELEPHONE ENCOUNTER
Notified patient of results. Patient verbalized understanding.    Sonya Garcia RN  [place] [facility] [place]

## 2022-10-20 RX ORDER — RIVAROXABAN 15 MG/1
TABLET, FILM COATED ORAL
Qty: 90 TABLET | Refills: 3 | Status: SHIPPED | OUTPATIENT
Start: 2022-10-20

## 2022-10-20 NOTE — TELEPHONE ENCOUNTER
Rx Refill Note  Requested Prescriptions     Pending Prescriptions Disp Refills   • Xarelto 15 MG tablet [Pharmacy Med Name: XARELTO 15 MG TABLET] 90 tablet 3     Sig: TAKE ONE TABLET BY MOUTH DAILY      Last office visit with prescribing clinician: 6/28/2022      Next office visit with prescribing clinician: 1/6/2023 ESTEFANY Richard MA  10/20/22, 15:54 EDT

## 2022-12-05 ENCOUNTER — HOSPITAL ENCOUNTER (OUTPATIENT)
Facility: HOSPITAL | Age: 87
Setting detail: OBSERVATION
Discharge: HOME HEALTH | End: 2022-12-07
Attending: EMERGENCY MEDICINE | Admitting: INTERNAL MEDICINE

## 2022-12-05 ENCOUNTER — APPOINTMENT (OUTPATIENT)
Dept: GENERAL RADIOLOGY | Facility: HOSPITAL | Age: 87
End: 2022-12-05

## 2022-12-05 DIAGNOSIS — J18.9 PNEUMONIA OF LEFT LUNG DUE TO INFECTIOUS ORGANISM, UNSPECIFIED PART OF LUNG: Primary | ICD-10-CM

## 2022-12-05 LAB
ALBUMIN SERPL-MCNC: 4 G/DL (ref 3.5–5.2)
ALBUMIN/GLOB SERPL: 1.3 G/DL
ALP SERPL-CCNC: 111 U/L (ref 39–117)
ALT SERPL W P-5'-P-CCNC: 12 U/L (ref 1–33)
ANION GAP SERPL CALCULATED.3IONS-SCNC: 11.4 MMOL/L (ref 5–15)
AST SERPL-CCNC: 25 U/L (ref 1–32)
BACTERIA UR QL AUTO: ABNORMAL /HPF
BASOPHILS # BLD AUTO: 0.04 10*3/MM3 (ref 0–0.2)
BASOPHILS NFR BLD AUTO: 0.3 % (ref 0–1.5)
BILIRUB SERPL-MCNC: 0.3 MG/DL (ref 0–1.2)
BILIRUB UR QL STRIP: NEGATIVE
BUN SERPL-MCNC: 18 MG/DL (ref 8–23)
BUN/CREAT SERPL: 15.5 (ref 7–25)
CALCIUM SPEC-SCNC: 9.2 MG/DL (ref 8.2–9.6)
CHLORIDE SERPL-SCNC: 104 MMOL/L (ref 98–107)
CLARITY UR: CLEAR
CO2 SERPL-SCNC: 25.6 MMOL/L (ref 22–29)
COLOR UR: YELLOW
CREAT SERPL-MCNC: 1.16 MG/DL (ref 0.57–1)
DEPRECATED RDW RBC AUTO: 48.3 FL (ref 37–54)
EGFRCR SERPLBLD CKD-EPI 2021: 43.8 ML/MIN/1.73
EOSINOPHIL # BLD AUTO: 0.19 10*3/MM3 (ref 0–0.4)
EOSINOPHIL NFR BLD AUTO: 1.2 % (ref 0.3–6.2)
ERYTHROCYTE [DISTWIDTH] IN BLOOD BY AUTOMATED COUNT: 15.1 % (ref 12.3–15.4)
GLOBULIN UR ELPH-MCNC: 3 GM/DL
GLUCOSE SERPL-MCNC: 109 MG/DL (ref 65–99)
GLUCOSE UR STRIP-MCNC: NEGATIVE MG/DL
HCT VFR BLD AUTO: 37.4 % (ref 34–46.6)
HGB BLD-MCNC: 11.9 G/DL (ref 12–15.9)
HGB UR QL STRIP.AUTO: ABNORMAL
HOLD SPECIMEN: NORMAL
HYALINE CASTS UR QL AUTO: ABNORMAL /LPF
IMM GRANULOCYTES # BLD AUTO: 2.95 10*3/MM3 (ref 0–0.05)
IMM GRANULOCYTES NFR BLD AUTO: 18.4 % (ref 0–0.5)
KETONES UR QL STRIP: NEGATIVE
LEUKOCYTE ESTERASE UR QL STRIP.AUTO: NEGATIVE
LYMPHOCYTES # BLD AUTO: 0.85 10*3/MM3 (ref 0.7–3.1)
LYMPHOCYTES NFR BLD AUTO: 5.3 % (ref 19.6–45.3)
MCH RBC QN AUTO: 28 PG (ref 26.6–33)
MCHC RBC AUTO-ENTMCNC: 31.8 G/DL (ref 31.5–35.7)
MCV RBC AUTO: 88 FL (ref 79–97)
MONOCYTES # BLD AUTO: 0.08 10*3/MM3 (ref 0.1–0.9)
MONOCYTES NFR BLD AUTO: 0.5 % (ref 5–12)
NEUTROPHILS NFR BLD AUTO: 11.89 10*3/MM3 (ref 1.7–7)
NEUTROPHILS NFR BLD AUTO: 74.3 % (ref 42.7–76)
NITRITE UR QL STRIP: POSITIVE
NRBC BLD AUTO-RTO: 0.4 /100 WBC (ref 0–0.2)
NT-PROBNP SERPL-MCNC: 283.4 PG/ML (ref 0–1800)
PH UR STRIP.AUTO: 5.5 [PH] (ref 4.5–8)
PLATELET # BLD AUTO: 204 10*3/MM3 (ref 140–450)
PMV BLD AUTO: 9.6 FL (ref 6–12)
POTASSIUM SERPL-SCNC: 4 MMOL/L (ref 3.5–5.2)
PROT SERPL-MCNC: 7 G/DL (ref 6–8.5)
PROT UR QL STRIP: NEGATIVE
RBC # BLD AUTO: 4.25 10*6/MM3 (ref 3.77–5.28)
RBC # UR STRIP: ABNORMAL /HPF
RBC MORPH BLD: NORMAL
REF LAB TEST METHOD: ABNORMAL
SMALL PLATELETS BLD QL SMEAR: ADEQUATE
SODIUM SERPL-SCNC: 141 MMOL/L (ref 136–145)
SP GR UR STRIP: 1.02 (ref 1–1.03)
SQUAMOUS #/AREA URNS HPF: ABNORMAL /HPF
TROPONIN T SERPL-MCNC: 0.03 NG/ML (ref 0–0.03)
UROBILINOGEN UR QL STRIP: ABNORMAL
WBC # UR STRIP: ABNORMAL /HPF
WBC MORPH BLD: NORMAL
WBC NRBC COR # BLD: 16 10*3/MM3 (ref 3.4–10.8)
WHOLE BLOOD HOLD COAG: NORMAL
WHOLE BLOOD HOLD SPECIMEN: NORMAL

## 2022-12-05 PROCEDURE — 83605 ASSAY OF LACTIC ACID: CPT | Performed by: EMERGENCY MEDICINE

## 2022-12-05 PROCEDURE — 93010 ELECTROCARDIOGRAM REPORT: CPT | Performed by: INTERNAL MEDICINE

## 2022-12-05 PROCEDURE — 71046 X-RAY EXAM CHEST 2 VIEWS: CPT

## 2022-12-05 PROCEDURE — 36415 COLL VENOUS BLD VENIPUNCTURE: CPT

## 2022-12-05 PROCEDURE — 99285 EMERGENCY DEPT VISIT HI MDM: CPT

## 2022-12-05 PROCEDURE — 85007 BL SMEAR W/DIFF WBC COUNT: CPT

## 2022-12-05 PROCEDURE — 94761 N-INVAS EAR/PLS OXIMETRY MLT: CPT

## 2022-12-05 PROCEDURE — 81001 URINALYSIS AUTO W/SCOPE: CPT | Performed by: EMERGENCY MEDICINE

## 2022-12-05 PROCEDURE — 84484 ASSAY OF TROPONIN QUANT: CPT

## 2022-12-05 PROCEDURE — 93005 ELECTROCARDIOGRAM TRACING: CPT

## 2022-12-05 PROCEDURE — 87040 BLOOD CULTURE FOR BACTERIA: CPT | Performed by: EMERGENCY MEDICINE

## 2022-12-05 PROCEDURE — 80053 COMPREHEN METABOLIC PANEL: CPT

## 2022-12-05 PROCEDURE — 85025 COMPLETE CBC W/AUTO DIFF WBC: CPT

## 2022-12-05 PROCEDURE — 87186 SC STD MICRODIL/AGAR DIL: CPT | Performed by: EMERGENCY MEDICINE

## 2022-12-05 PROCEDURE — 87086 URINE CULTURE/COLONY COUNT: CPT | Performed by: EMERGENCY MEDICINE

## 2022-12-05 PROCEDURE — 83880 ASSAY OF NATRIURETIC PEPTIDE: CPT

## 2022-12-05 PROCEDURE — 87088 URINE BACTERIA CULTURE: CPT | Performed by: EMERGENCY MEDICINE

## 2022-12-05 PROCEDURE — 84145 PROCALCITONIN (PCT): CPT | Performed by: EMERGENCY MEDICINE

## 2022-12-05 RX ORDER — SODIUM CHLORIDE 0.9 % (FLUSH) 0.9 %
10 SYRINGE (ML) INJECTION AS NEEDED
Status: DISCONTINUED | OUTPATIENT
Start: 2022-12-05 | End: 2022-12-07 | Stop reason: HOSPADM

## 2022-12-05 RX ORDER — CEFTRIAXONE 1 G/50ML
1 INJECTION, SOLUTION INTRAVENOUS ONCE
Status: COMPLETED | OUTPATIENT
Start: 2022-12-05 | End: 2022-12-06

## 2022-12-06 ENCOUNTER — APPOINTMENT (OUTPATIENT)
Dept: CT IMAGING | Facility: HOSPITAL | Age: 87
End: 2022-12-06

## 2022-12-06 PROBLEM — J18.9 PNEUMONIA OF LEFT LUNG DUE TO INFECTIOUS ORGANISM, UNSPECIFIED PART OF LUNG: Status: ACTIVE | Noted: 2022-12-06

## 2022-12-06 LAB
ANION GAP SERPL CALCULATED.3IONS-SCNC: 10 MMOL/L (ref 5–15)
B PARAPERT DNA SPEC QL NAA+PROBE: NOT DETECTED
B PERT DNA SPEC QL NAA+PROBE: NOT DETECTED
BASOPHILS # BLD AUTO: 0.02 10*3/MM3 (ref 0–0.2)
BASOPHILS NFR BLD AUTO: 0.1 % (ref 0–1.5)
BUN SERPL-MCNC: 18 MG/DL (ref 8–23)
BUN/CREAT SERPL: 20 (ref 7–25)
C PNEUM DNA NPH QL NAA+NON-PROBE: NOT DETECTED
CALCIUM SPEC-SCNC: 8.5 MG/DL (ref 8.2–9.6)
CHLORIDE SERPL-SCNC: 105 MMOL/L (ref 98–107)
CO2 SERPL-SCNC: 25 MMOL/L (ref 22–29)
CREAT SERPL-MCNC: 0.9 MG/DL (ref 0.57–1)
D-LACTATE SERPL-SCNC: 1.4 MMOL/L (ref 0.5–2)
DEPRECATED RDW RBC AUTO: 48.4 FL (ref 37–54)
EGFRCR SERPLBLD CKD-EPI 2021: 59.4 ML/MIN/1.73
EOSINOPHIL # BLD AUTO: 0.06 10*3/MM3 (ref 0–0.4)
EOSINOPHIL NFR BLD AUTO: 0.4 % (ref 0.3–6.2)
ERYTHROCYTE [DISTWIDTH] IN BLOOD BY AUTOMATED COUNT: 14.9 % (ref 12.3–15.4)
FLUAV RNA RESP QL NAA+PROBE: NOT DETECTED
FLUAV SUBTYP SPEC NAA+PROBE: NOT DETECTED
FLUBV RNA ISLT QL NAA+PROBE: NOT DETECTED
FLUBV RNA RESP QL NAA+PROBE: NOT DETECTED
GLUCOSE SERPL-MCNC: 112 MG/DL (ref 65–99)
HADV DNA SPEC NAA+PROBE: NOT DETECTED
HCOV 229E RNA SPEC QL NAA+PROBE: NOT DETECTED
HCOV HKU1 RNA SPEC QL NAA+PROBE: NOT DETECTED
HCOV NL63 RNA SPEC QL NAA+PROBE: NOT DETECTED
HCOV OC43 RNA SPEC QL NAA+PROBE: NOT DETECTED
HCT VFR BLD AUTO: 31.7 % (ref 34–46.6)
HGB BLD-MCNC: 10 G/DL (ref 12–15.9)
HMPV RNA NPH QL NAA+NON-PROBE: NOT DETECTED
HPIV1 RNA ISLT QL NAA+PROBE: NOT DETECTED
HPIV2 RNA SPEC QL NAA+PROBE: NOT DETECTED
HPIV3 RNA NPH QL NAA+PROBE: NOT DETECTED
HPIV4 P GENE NPH QL NAA+PROBE: NOT DETECTED
IMM GRANULOCYTES # BLD AUTO: 0.83 10*3/MM3 (ref 0–0.05)
IMM GRANULOCYTES NFR BLD AUTO: 4.9 % (ref 0–0.5)
LYMPHOCYTES # BLD AUTO: 0.76 10*3/MM3 (ref 0.7–3.1)
LYMPHOCYTES NFR BLD AUTO: 4.5 % (ref 19.6–45.3)
M PNEUMO IGG SER IA-ACNC: NOT DETECTED
MCH RBC QN AUTO: 28 PG (ref 26.6–33)
MCHC RBC AUTO-ENTMCNC: 31.5 G/DL (ref 31.5–35.7)
MCV RBC AUTO: 88.8 FL (ref 79–97)
MONOCYTES # BLD AUTO: 0.88 10*3/MM3 (ref 0.1–0.9)
MONOCYTES NFR BLD AUTO: 5.2 % (ref 5–12)
NEUTROPHILS NFR BLD AUTO: 14.4 10*3/MM3 (ref 1.7–7)
NEUTROPHILS NFR BLD AUTO: 84.9 % (ref 42.7–76)
PLATELET # BLD AUTO: 188 10*3/MM3 (ref 140–450)
PMV BLD AUTO: 9.8 FL (ref 6–12)
POTASSIUM SERPL-SCNC: 3.8 MMOL/L (ref 3.5–5.2)
PROCALCITONIN SERPL-MCNC: 0.14 NG/ML (ref 0–0.25)
QT INTERVAL: 312 MS
RBC # BLD AUTO: 3.57 10*6/MM3 (ref 3.77–5.28)
RHINOVIRUS RNA SPEC NAA+PROBE: NOT DETECTED
RSV RNA NPH QL NAA+NON-PROBE: NOT DETECTED
SARS-COV-2 RNA NPH QL NAA+NON-PROBE: NOT DETECTED
SARS-COV-2 RNA RESP QL NAA+PROBE: NOT DETECTED
SODIUM SERPL-SCNC: 140 MMOL/L (ref 136–145)
WBC NRBC COR # BLD: 16.95 10*3/MM3 (ref 3.4–10.8)

## 2022-12-06 PROCEDURE — 97161 PT EVAL LOW COMPLEX 20 MIN: CPT

## 2022-12-06 PROCEDURE — 96372 THER/PROPH/DIAG INJ SC/IM: CPT

## 2022-12-06 PROCEDURE — 71250 CT THORAX DX C-: CPT

## 2022-12-06 PROCEDURE — 94618 PULMONARY STRESS TESTING: CPT

## 2022-12-06 PROCEDURE — G0378 HOSPITAL OBSERVATION PER HR: HCPCS

## 2022-12-06 PROCEDURE — 0202U NFCT DS 22 TRGT SARS-COV-2: CPT | Performed by: INTERNAL MEDICINE

## 2022-12-06 PROCEDURE — 99219 PR INITIAL OBSERVATION CARE/DAY 50 MINUTES: CPT | Performed by: HOSPITALIST

## 2022-12-06 PROCEDURE — 87040 BLOOD CULTURE FOR BACTERIA: CPT | Performed by: EMERGENCY MEDICINE

## 2022-12-06 PROCEDURE — 96365 THER/PROPH/DIAG IV INF INIT: CPT

## 2022-12-06 PROCEDURE — 87636 SARSCOV2 & INF A&B AMP PRB: CPT | Performed by: EMERGENCY MEDICINE

## 2022-12-06 PROCEDURE — 80048 BASIC METABOLIC PNL TOTAL CA: CPT | Performed by: HOSPITALIST

## 2022-12-06 PROCEDURE — 25010000002 CEFTRIAXONE SODIUM-DEXTROSE 1-3.74 GM-%(50ML) RECONSTITUTED SOLUTION: Performed by: EMERGENCY MEDICINE

## 2022-12-06 PROCEDURE — 85025 COMPLETE CBC W/AUTO DIFF WBC: CPT | Performed by: HOSPITALIST

## 2022-12-06 PROCEDURE — 96367 TX/PROPH/DG ADDL SEQ IV INF: CPT

## 2022-12-06 PROCEDURE — 94799 UNLISTED PULMONARY SVC/PX: CPT

## 2022-12-06 PROCEDURE — 25010000002 HEPARIN (PORCINE) PER 1000 UNITS: Performed by: HOSPITALIST

## 2022-12-06 RX ORDER — HEPARIN SODIUM 5000 [USP'U]/ML
5000 INJECTION, SOLUTION INTRAVENOUS; SUBCUTANEOUS EVERY 12 HOURS SCHEDULED
Status: DISCONTINUED | OUTPATIENT
Start: 2022-12-06 | End: 2022-12-06

## 2022-12-06 RX ORDER — ISOSORBIDE MONONITRATE 30 MG/1
30 TABLET, EXTENDED RELEASE ORAL EVERY MORNING
Status: DISCONTINUED | OUTPATIENT
Start: 2022-12-07 | End: 2022-12-07 | Stop reason: HOSPADM

## 2022-12-06 RX ORDER — CARVEDILOL 3.12 MG/1
3.12 TABLET ORAL 2 TIMES DAILY WITH MEALS
Status: DISCONTINUED | OUTPATIENT
Start: 2022-12-06 | End: 2022-12-07 | Stop reason: HOSPADM

## 2022-12-06 RX ORDER — FUROSEMIDE 40 MG/1
40 TABLET ORAL DAILY
Status: DISCONTINUED | OUTPATIENT
Start: 2022-12-06 | End: 2022-12-06

## 2022-12-06 RX ORDER — CARVEDILOL 6.25 MG/1
6.25 TABLET ORAL 2 TIMES DAILY WITH MEALS
Status: DISCONTINUED | OUTPATIENT
Start: 2022-12-06 | End: 2022-12-06

## 2022-12-06 RX ORDER — IPRATROPIUM BROMIDE 42 UG/1
2 SPRAY, METERED NASAL 4 TIMES DAILY
Status: DISCONTINUED | OUTPATIENT
Start: 2022-12-06 | End: 2022-12-07 | Stop reason: HOSPADM

## 2022-12-06 RX ORDER — DOXYCYCLINE 100 MG/1
100 CAPSULE ORAL EVERY 12 HOURS SCHEDULED
Status: DISCONTINUED | OUTPATIENT
Start: 2022-12-06 | End: 2022-12-07 | Stop reason: HOSPADM

## 2022-12-06 RX ORDER — AMLODIPINE BESYLATE 5 MG/1
5 TABLET ORAL
Status: DISCONTINUED | OUTPATIENT
Start: 2022-12-06 | End: 2022-12-07 | Stop reason: HOSPADM

## 2022-12-06 RX ORDER — SODIUM CHLORIDE 9 MG/ML
INJECTION, SOLUTION INTRAVENOUS
Status: DISPENSED
Start: 2022-12-06 | End: 2022-12-06

## 2022-12-06 RX ORDER — AMLODIPINE BESYLATE 2.5 MG/1
2.5 TABLET ORAL DAILY
Status: DISCONTINUED | OUTPATIENT
Start: 2022-12-06 | End: 2022-12-06

## 2022-12-06 RX ORDER — SODIUM CHLORIDE 9 MG/ML
40 INJECTION, SOLUTION INTRAVENOUS AS NEEDED
Status: DISCONTINUED | OUTPATIENT
Start: 2022-12-06 | End: 2022-12-07 | Stop reason: HOSPADM

## 2022-12-06 RX ORDER — FAMOTIDINE 20 MG/1
40 TABLET, FILM COATED ORAL DAILY
Status: DISCONTINUED | OUTPATIENT
Start: 2022-12-06 | End: 2022-12-07 | Stop reason: HOSPADM

## 2022-12-06 RX ORDER — SODIUM CHLORIDE 0.9 % (FLUSH) 0.9 %
10 SYRINGE (ML) INJECTION AS NEEDED
Status: DISCONTINUED | OUTPATIENT
Start: 2022-12-06 | End: 2022-12-07 | Stop reason: HOSPADM

## 2022-12-06 RX ORDER — FUROSEMIDE 40 MG/1
40 TABLET ORAL DAILY PRN
Status: DISCONTINUED | OUTPATIENT
Start: 2022-12-06 | End: 2022-12-07 | Stop reason: HOSPADM

## 2022-12-06 RX ORDER — SODIUM CHLORIDE 0.9 % (FLUSH) 0.9 %
10 SYRINGE (ML) INJECTION EVERY 12 HOURS SCHEDULED
Status: DISCONTINUED | OUTPATIENT
Start: 2022-12-06 | End: 2022-12-07 | Stop reason: HOSPADM

## 2022-12-06 RX ORDER — DOXYCYCLINE 100 MG/10ML
INJECTION, POWDER, LYOPHILIZED, FOR SOLUTION INTRAVENOUS
Status: DISPENSED
Start: 2022-12-06 | End: 2022-12-06

## 2022-12-06 RX ORDER — SODIUM CHLORIDE 9 MG/ML
100 INJECTION, SOLUTION INTRAVENOUS CONTINUOUS
Status: DISCONTINUED | OUTPATIENT
Start: 2022-12-06 | End: 2022-12-06

## 2022-12-06 RX ORDER — ATORVASTATIN CALCIUM 20 MG/1
20 TABLET, FILM COATED ORAL DAILY
Status: DISCONTINUED | OUTPATIENT
Start: 2022-12-06 | End: 2022-12-07 | Stop reason: HOSPADM

## 2022-12-06 RX ORDER — CEFTRIAXONE 1 G/50ML
1 INJECTION, SOLUTION INTRAVENOUS EVERY 24 HOURS
Status: DISCONTINUED | OUTPATIENT
Start: 2022-12-07 | End: 2022-12-07 | Stop reason: HOSPADM

## 2022-12-06 RX ORDER — LEVOTHYROXINE SODIUM 0.07 MG/1
75 TABLET ORAL DAILY
Status: DISCONTINUED | OUTPATIENT
Start: 2022-12-06 | End: 2022-12-07 | Stop reason: HOSPADM

## 2022-12-06 RX ADMIN — AMLODIPINE BESYLATE 5 MG: 5 TABLET ORAL at 15:35

## 2022-12-06 RX ADMIN — DOXYCYCLINE 100 MG: 100 CAPSULE ORAL at 10:50

## 2022-12-06 RX ADMIN — SODIUM CHLORIDE 100 ML/HR: 9 INJECTION, SOLUTION INTRAVENOUS at 04:59

## 2022-12-06 RX ADMIN — Medication 10 ML: at 21:26

## 2022-12-06 RX ADMIN — ATORVASTATIN CALCIUM 20 MG: 20 TABLET, FILM COATED ORAL at 15:36

## 2022-12-06 RX ADMIN — DOXYCYCLINE 100 MG: 100 INJECTION, POWDER, LYOPHILIZED, FOR SOLUTION INTRAVENOUS at 01:31

## 2022-12-06 RX ADMIN — DOXYCYCLINE 100 MG: 100 CAPSULE ORAL at 21:26

## 2022-12-06 RX ADMIN — FAMOTIDINE 40 MG: 20 TABLET ORAL at 15:36

## 2022-12-06 RX ADMIN — SODIUM CHLORIDE, POTASSIUM CHLORIDE, SODIUM LACTATE AND CALCIUM CHLORIDE 500 ML: 600; 310; 30; 20 INJECTION, SOLUTION INTRAVENOUS at 00:02

## 2022-12-06 RX ADMIN — CEFTRIAXONE 1 G: 1 INJECTION, SOLUTION INTRAVENOUS at 00:10

## 2022-12-06 RX ADMIN — Medication 10 ML: at 10:50

## 2022-12-06 RX ADMIN — RIVAROXABAN 15 MG: 15 TABLET, FILM COATED ORAL at 15:35

## 2022-12-06 RX ADMIN — CARVEDILOL 3.12 MG: 3.12 TABLET, FILM COATED ORAL at 18:21

## 2022-12-06 RX ADMIN — LEVOTHYROXINE SODIUM 75 MCG: 75 TABLET ORAL at 15:36

## 2022-12-06 RX ADMIN — HEPARIN SODIUM 5000 UNITS: 5000 INJECTION, SOLUTION INTRAVENOUS; SUBCUTANEOUS at 10:52

## 2022-12-06 NOTE — PROGRESS NOTES
Home medications brought in by family member, updated and ordered in our system by me. Heparin Dc'd, xarelto resumed. Blood pressure not at goal, provide amlodipine 5mg daily, in addition to home imdur and carvedilol.

## 2022-12-06 NOTE — PLAN OF CARE
Goal Outcome Evaluation:  Plan of Care Reviewed With: patient        Progress: no change  Outcome Evaluation: pt arrived to room w/daughter - oriented to room, given call light - bed alarm in place - walker and gait belt in room for amulation assistance - MD to visit pt and assign orders

## 2022-12-06 NOTE — PLAN OF CARE
Goal Outcome Evaluation:  Plan of Care Reviewed With: patient           Outcome Evaluation: Physical therapy evaluation complete.  Patient performs supine to sit with min assist and sit to stand with CGA.  Patient performs gait x10 feet with rolling walker, CGA.  Patient reports significant fatigue with minimal activity.  Patient will benefit from physical therapy to address deficits in functional mobility and activity tolerance.  Anticipate return home with home health, however may benefit from short term rehab if overall mobility does not improve to a sufficient level to return home alone.

## 2022-12-06 NOTE — H&P
[facility] Medicine Services      Patient Name: Doris Coreas  : 1928  MRN: [id number]  Primary Care Physician:  Leeann Smith MD  Date of admission: 2022      Subjective      Chief Complaint: Shortness of breath    History of Present Illness: Doris Coreas is a 89 y.o. female who presented to [facility] on 2022 complaining of having some difficulty in breathing, patient noted hypoxic in ER requiring supplemental oxygen.  Underwent work-up revealing normal proBNP, chest x-ray revealed possible infiltrate in upper lobes, patient noted to have peripheral leukocytosis and also UA revealing WBC elevated at.  Patient received IV Rocephin and doxycycline in ER, following this asked with the patient for the further care.  Patient noted to have significant crackles in both lung fields.      Review of Systems   Respiratory: Positive for shortness of breath.    All other systems reviewed and are negative.       Personal History     Past Medical History:   Diagnosis Date   • Carotid artery stenosis    • Chronic renal insufficiency    • Compression fracture of lumbar vertebra (HCC)    • Difficulty breathing    • Fatigue    • H/O echocardiogram 2015   • Health care maintenance    • History of EKG 2015   • Hyperlipidemia    • Hypertension    • Hypothyroidism    • PAF (paroxysmal atrial fibrillation) (HCC)    • Stroke (HCC)    • Transfusion history 2019       Past Surgical History:   Procedure Laterality Date   • CATARACT EXTRACTION     • CHOLECYSTECTOMY     • HERNIA REPAIR         Family History: family history includes Heart failure in her mother. Otherwise pertinent FHx was reviewed and not pertinent to current issue.    Social History:  reports that she has never smoked. She has never used smokeless tobacco. She reports that she does not drink alcohol and does not use drugs.    Home Medications:  Prior to Admission Medications     Prescriptions Last  Dose Informant Patient Reported? Taking?    amLODIPine (NORVASC) 2.5 MG tablet   Yes No    Take 1 tablet by mouth Daily.    amoxicillin-clavulanate (AUGMENTIN) 250-125 MG per tablet   Yes No    Take 1 tablet by mouth Daily.    B Complex Vitamins (VITAMIN B COMPLEX) tablet   Yes No    Take 1 tablet by mouth Daily.    carvedilol (COREG) 6.25 MG tablet   No No    Take 1 tablet by mouth 2 (Two) Times a Day With Meals.    Cholecalciferol (VITAMIN D PO)   Yes No    Take 1 tablet by mouth Daily.    famotidine (PEPCID) 40 MG tablet   Yes No    Take 40 mg by mouth Daily.    furosemide (LASIX) 40 MG tablet   Yes No    Take 40 mg by mouth Daily.    ipratropium (ATROVENT) 0.06 % nasal spray   Yes No    2 sprays into the nostril(s) as directed by provider 4 (Four) Times a Day.    isosorbide mononitrate (IMDUR) 30 MG 24 hr tablet   No No    TAKE ONE TABLET BY MOUTH EVERY MORNING    levothyroxine (SYNTHROID, LEVOTHROID) 75 MCG tablet   Yes No    Take 75 mcg by mouth Daily.    miconazole (MICOTIN) 2 % powder   Yes No    Apply  topically to the appropriate area as directed As Needed.    simvastatin (ZOCOR) 40 MG tablet   Yes No    Every Night.    Xarelto 15 MG tablet   No No    TAKE ONE TABLET BY MOUTH DAILY            Allergies:  Allergies   Allergen Reactions   • Azithromycin    • Levofloxacin    • Losartan    • Losartan Potassium    • Macrodantin [Nitrofurantoin] Rash       Objective      Vitals:   Temp:  [98.1 °F (36.7 °C)-98.6 °F (37 °C)] 98.1 °F (36.7 °C)  Heart Rate:  [] 94  Resp:  [20] 20  BP: (131-177)/(71) 131/71  Flow (L/min):  [1] 1    Physical Exam  Vitals and nursing note reviewed.   Constitutional:       General: She is not in acute distress.     Appearance: Normal appearance. She is well-developed. She is not ill-appearing, toxic-appearing or diaphoretic.   HENT:      Head: Normocephalic and atraumatic.      Right Ear: Ear canal and external ear normal.      Left Ear: Ear canal and external ear normal.       Nose: Nose normal. No congestion or rhinorrhea.      Mouth/Throat:      Mouth: Mucous membranes are moist.      Pharynx: No oropharyngeal exudate.   Eyes:      General: No scleral icterus.        Right eye: No discharge.         Left eye: No discharge.      Extraocular Movements: Extraocular movements intact.      Conjunctiva/sclera: Conjunctivae normal.      Pupils: Pupils are equal, round, and reactive to light.   Neck:      Thyroid: No thyromegaly.      Vascular: No carotid bruit or JVD.      Trachea: No tracheal deviation.   Cardiovascular:      Rate and Rhythm: Normal rate and regular rhythm.      Pulses: Normal pulses.      Heart sounds: Normal heart sounds. No murmur heard.    No friction rub. No gallop.   Pulmonary:      Effort: No respiratory distress.      Breath sounds: No stridor. Rales present. No wheezing or rhonchi.   Chest:      Chest wall: No tenderness.   Abdominal:      General: Bowel sounds are normal. There is no distension.      Palpations: Abdomen is soft. There is no mass.      Tenderness: There is no abdominal tenderness. There is no guarding or rebound.      Hernia: No hernia is present.   Musculoskeletal:         General: No swelling, tenderness, deformity or signs of injury. Normal range of motion.      Cervical back: Normal range of motion and neck supple. No rigidity. No muscular tenderness.      Right lower leg: No edema.      Left lower leg: No edema.   Lymphadenopathy:      Cervical: No cervical adenopathy.   Skin:     General: Skin is warm and dry.      Coloration: Skin is not jaundiced or pale.      Findings: No bruising, erythema or rash.   Neurological:      General: No focal deficit present.      Mental Status: She is alert and oriented to person, place, and time. Mental status is at baseline.      Cranial Nerves: No cranial nerve deficit.      Sensory: No sensory deficit.      Motor: No weakness or abnormal muscle tone.      Coordination: Coordination normal.   Psychiatric:          Mood and Affect: Mood normal.         Behavior: Behavior normal.         Thought Content: Thought content normal.         Judgment: Judgment normal.              Result Review    Result Review:  I have personally reviewed the results from the time of this admission to 12/6/2022 04:23 EST and agree with these findings:  [x]  Laboratory  [x]  Microbiology  [x]  Radiology  [x]  EKG/Telemetry   []  Cardiology/Vascular   []  Pathology  []  Old records  []  Other:  Most notable findings include:     Assessment & Plan        Active Hospital Problems:  Active Hospital Problems    Diagnosis    • **Pneumonia of left lung due to infectious organism, unspecified part of lung      Plan:     Home medication not reconciled, waiting on nursing to reconcile first.    Likely pneumonia bilateral, IV Rocephin and doxycycline, CT chest without contrast, sputum culture if possible.    Hypoxia secondary to above, continue supplemental oxygen.    Acute UTI, follow urine culture, patient on IV Rocephin.  Monitor WBC count.    Hypertension, resume home regimen, monitor vitals.    Hypothyroidism, continue Synthroid, monitor TSH as needed.    DVT prophylaxis with heparin subcu.      DVT prophylaxis:  Medical DVT prophylaxis orders are present.    CODE STATUS:    Level Of Support Discussed With: Patient  Code Status (Patient has no pulse and is not breathing): CPR (Attempt to Resuscitate)  Medical Interventions (Patient has pulse or is breathing): Full Support    Admission Status:  I believe this patient meets observation status.    I discussed the patient's findings and my recommendations with patient.    This patient has been examined wearing appropriate Personal Protective Equipment and discussed with hospital infection control department. 12/06/22      Signature:

## 2022-12-06 NOTE — ED PROVIDER NOTES
Subjective   History of Present Illness  89-year-old female presents with shortness of breath, subjective fevers and chills, 1 episode of vomiting, all starting this evening.  Patient found to have decreased oxygen saturations upon EMS arrival, was placed on oxygen.  By time of my evaluation here patient had been on oxygen for a little while and received some IV fluids and states she feels much better.  Reports the oxygen seemed to help quite a bit.  Reports she had had an occasional tickle in her throat but not much of a cough.  Patient reports she has baseline urinary incontinence but it is no worse than normal today.        Review of Systems   All other systems reviewed and are negative.      Past Medical History:   Diagnosis Date   • Carotid artery stenosis    • Chronic renal insufficiency    • Compression fracture of lumbar vertebra (HCC)    • Difficulty breathing    • Fatigue    • H/O echocardiogram 05/20/2015   • Health care maintenance    • History of EKG 09/25/2015   • Hyperlipidemia    • Hypertension    • Hypothyroidism    • PAF (paroxysmal atrial fibrillation) ([facility])    • Stroke ([facility])    • Transfusion history 07/2019       Allergies   Allergen Reactions   • Azithromycin    • Levofloxacin    • Losartan    • Losartan Potassium    • Macrodantin [Nitrofurantoin] Rash       Past Surgical History:   Procedure Laterality Date   • CATARACT EXTRACTION     • CHOLECYSTECTOMY     • HERNIA REPAIR         Family History   Problem Relation Age of Onset   • Heart failure Mother        Social History     Socioeconomic History   • Marital status:    Tobacco Use   • Smoking status: Never   • Smokeless tobacco: Never   Substance and Sexual Activity   • Alcohol use: No     Comment: caffeine use: 2 cups daily.    • Drug use: No   • Sexual activity: Defer           Objective   Physical Exam  Constitutional:       General: She is not in acute distress.     Appearance: She is not toxic-appearing.   HENT:      Head:  Normocephalic and atraumatic.      Mouth/Throat:      Mouth: Mucous membranes are moist.      Pharynx: Oropharynx is clear.   Eyes:      Extraocular Movements: Extraocular movements intact.      Pupils: Pupils are equal, round, and reactive to light.   Cardiovascular:      Rate and Rhythm: Normal rate and regular rhythm.      Pulses: Normal pulses.      Heart sounds: Normal heart sounds.   Pulmonary:      Effort: Pulmonary effort is normal. No respiratory distress.      Comments: Diminished throughout  Abdominal:      General: There is no distension.      Palpations: Abdomen is soft.      Tenderness: There is no abdominal tenderness.   Musculoskeletal:         General: No tenderness, deformity or signs of injury. Normal range of motion.      Right lower leg: Edema present.      Left lower leg: Edema present.   Skin:     General: Skin is warm and dry.   Neurological:      General: No focal deficit present.      Mental Status: She is alert and oriented to person, place, and time. Mental status is at baseline.   Psychiatric:         Mood and Affect: Mood normal.         Behavior: Behavior normal.         Thought Content: Thought content normal.         Judgment: Judgment normal.         Procedures           ED Course  ED Course as of 12/06/22 0142   Tue Dec 06, 2022   0140 Patient breathing comfortably when she is on oxygen but still desats to mid 80s if you take it off.  Looks to have pneumonia.  She has 4+ bacteria in urine as well but only 0-2 whites not clear if this is colonization or acute infection.  She reports she is incontinent all the time but has not had any worsening of her baseline symptoms.  Patient has some antibiotic allergies so I ordered ceftriaxone and doxycycline as this should cover community-acquired pneumonia and UTI.  Will admit patient for further management. [TD]      ED Course User Index  [TD] Nik Puckett MD                                           [facility] [place]    Final diagnoses:   Pneumonia  of left lung due to infectious organism, unspecified part of lung       ED Disposition  ED Disposition     ED Disposition   Decision to Admit    Condition   --    Comment   Level of Care: Telemetry [5]   Diagnosis: Pneumonia of left lung due to infectious organism, unspecified part of lung [0792267]   Admitting Physician: SHANTELLE BULLOCK [[id number]]   Attending Physician: SHANTELLE BULLOCK [[id number]]               No follow-up provider specified.       Medication List      No changes were made to your prescriptions during this visit.          Nik Puckett MD  12/06/22 0142

## 2022-12-06 NOTE — NURSING NOTE
RN attempted to call [facility] to clarify home medications as well as her daughter. Daughter states when she gets home she will check her mother's prescriptions

## 2022-12-06 NOTE — PROCEDURES
Exercise Oximetry    Patient Name:Doris Coreas   MRN: [id number]   Date: 12/06/22             ROOM AIR BASELINE   SpO2% 87   Heart Rate 75   Blood Pressure      EXERCISE ON ROOM AIR SpO2% EXERCISE ON O2 @ 1 LPM SpO2%   1 MINUTE  1 MINUTE 90   2 MINUTES  2 MINUTES 91   3 MINUTES  3 MINUTES 90   4 MINUTES  4 MINUTES 92   5 MINUTES  5 MINUTES 91   6 MINUTES  6 MINUTES 90              Distance Walked   Distance Walked 100ft   Dyspnea (Earlene Scale)   Dyspnea (Earlene Scale)   Fatigue (Earlene Scale)   Fatigue (Earlene Scale)   SpO2% Post Exercise   SpO2% Post Exercise 94   HR Post Exercise   HR Post Exercise 86   Time to Recovery   Time to Recovery 1 minute     Comments: Forehead probe and rolling walker used during walk.  Pt required rest periods approximately every 30 ft. Resting SpO2 on 1lpm O2 94%.

## 2022-12-06 NOTE — THERAPY EVALUATION
Patient Name: Doris Coreas  : 1928    MRN: [id number]                              Today's Date: 2022       Admit Date: 2022    Visit Dx:     ICD-10-CM ICD-9-CM   1. Pneumonia of left lung due to infectious organism, unspecified part of lung  J18.9 486     Patient Active Problem List   Diagnosis   • Essential hypertension   • HLD (hyperlipidemia)   • Stroke (HCC)   • PAF (paroxysmal atrial fibrillation) (HCC)   • Abnormal stress test   • Pneumonia of left lung due to infectious organism, unspecified part of lung     Past Medical History:   Diagnosis Date   • Carotid artery stenosis    • Chronic renal insufficiency    • Compression fracture of lumbar vertebra (HCC)    • Difficulty breathing    • Fatigue    • H/O echocardiogram 2015   • Health care maintenance    • History of EKG 2015   • Hyperlipidemia    • Hypertension    • Hypothyroidism    • PAF (paroxysmal atrial fibrillation) (HCC)    • Stroke (HCC)    • Transfusion history 2019     Past Surgical History:   Procedure Laterality Date   • CATARACT EXTRACTION     • CHOLECYSTECTOMY     • HERNIA REPAIR        General Information     Row Name 22          Physical Therapy Time and Intention    Document Type evaluation  -     Mode of Treatment physical therapy  -     Row Name 22          General Information    Patient Profile Reviewed yes  -     Prior Level of Function independent:;all household mobility;community mobility  pt reports using rolling walker for mobility prior to admission  -     Existing Precautions/Restrictions fall;oxygen therapy device and L/min  -     Barriers to Rehab none identified  -     Row Name 22          Living Environment    People in Home alone  -     Row Name 2235          Home Main Entrance    Number of Stairs, Main Entrance none  patio home  -     Stair Railings, Main Entrance none  -     Row Name 22          Cognition    Orientation  Status (Cognition) oriented x 3  -           User Key  (r) = Recorded By, (t) = Taken By, (c) = Cosigned By    Initials Name Provider Type    Jada Vo, PT Physical Therapist               Mobility     Row Name 12/06/22 0835          Bed Mobility    Bed Mobility supine-sit  -     Supine-Sit [place] (Bed Mobility) minimum assist (75% patient effort)  -     Assistive Device (Bed Mobility) bed rails;draw sheet  -     Comment, (Bed Mobility) increased time to complete with maximal independence  -     Row Name 12/06/22 0835          Transfers    Comment, (Transfers) cues for hand placement  -     Row Name 12/06/22 0835          Sit-Stand Transfer    Sit-Stand [place] (Transfers) contact guard;verbal cues  -     Assistive Device (Sit-Stand Transfers) walker, front-wheeled  -     Comment, (Sit-Stand Transfer) cues for hand placement  -     Row Name 12/06/22 0835          Gait/Stairs (Locomotion)    [place] Level (Gait) contact guard  -     Assistive Device (Gait) walker, front-wheeled  -     Distance in Feet (Gait) 10  -     Bilateral Gait Deviations forward flexed posture  -     Comment, (Gait/Stairs) pt with significant fatigue following mobility activity, unable to perform further gait distance  -           User Key  (r) = Recorded By, (t) = Taken By, (c) = Cosigned By    Initials Name Provider Type     Jada Brown, PT Physical Therapist               Obj/Interventions     Row Name 12/06/22 0835          Range of Motion Comprehensive    Comment, General Range of Motion LE ROM WFL bilaterally  -     Row Name 12/06/22 0835          Strength Comprehensive (MMT)    Comment, General Manual Muscle Testing (MMT) Assessment LE strength functional within task  -     Row Name 12/06/22 0835          Balance    Comment, Balance SBA for sitting balance, CGA for dynamic balance with walker  -           User Key  (r) = Recorded By, (t) = Taken By, (c) = Cosigned By     Initials Name Provider Type    Jada Vo, PT Physical Therapist               Goals/Plan     Row Name 12/06/22 0835          Bed Mobility Goal 1 (PT)    Activity/Assistive Device (Bed Mobility Goal 1, PT) bed mobility activities, all  -JW     [place] Level/Cues Needed (Bed Mobility Goal 1, PT) supervision required  -JW     Time Frame (Bed Mobility Goal 1, PT) 3 days  -JW     Progress/Outcomes (Bed Mobility Goal 1, PT) new goal  -     Row Name 12/06/22 08          Transfer Goal 1 (PT)    Activity/Assistive Device (Transfer Goal 1, PT) transfers, all  -JW     [place] Level/Cues Needed (Transfer Goal 1, PT) supervision required  -JW     Time Frame (Transfer Goal 1, PT) 3 days  -JW     Progress/Outcome (Transfer Goal 1, PT) new goal  -     Row Name 12/06/22 08          Gait Training Goal 1 (PT)    Activity/Assistive Device (Gait Training Goal 1, PT) gait (walking locomotion);assistive device use  -JW     [place] Level (Gait Training Goal 1, PT) supervision required  -JW     Distance (Gait Training Goal 1, PT) 100  -JW     Time Frame (Gait Training Goal 1, PT) 3 days  -JW     Progress/Outcome (Gait Training Goal 1, PT) new goal  -     Row Name 12/06/22 0835          Therapy Assessment/Plan (PT)    Planned Therapy Interventions (PT) balance training;bed mobility training;gait training;home exercise program;strengthening;transfer training;patient/family education  -           User Key  (r) = Recorded By, (t) = Taken By, (c) = Cosigned By    Initials Name Provider Type    Jada Vo, PT Physical Therapist               Clinical Impression     Row Name 12/06/22 0835          Pain    Pretreatment Pain Rating 0/10 - no pain  -[facility] Name 12/06/22 08          Plan of Care Review    Plan of Care Reviewed With patient  -     Outcome Evaluation Physical therapy evaluation complete.  Patient performs supine to sit with min assist and sit to stand with CGA.  Patient performs gait  x10 feet with rolling walker, CGA.  Patient reports significant fatigue with minimal activity.  Patient will benefit from physical therapy to address deficits in functional mobility and activity tolerance.  Anticipate return home with home health, however may benefit from short term rehab if overall mobility does not improve to a sufficient level to return home alone.  -     Row Name 12/06/22 0835          Therapy Assessment/Plan (PT)    Patient/Family Therapy Goals Statement (PT) go home  -     Rehab Potential (PT) good, to achieve stated therapy goals  -     Criteria for Skilled Interventions Met (PT) yes;meets criteria  -     Therapy Frequency (PT) 6 times/wk  -     Predicted Duration of Therapy Intervention (PT) 3 days  -     Row Name 12/06/22 0835          Positioning and Restraints    Pre-Treatment Position in bed  -     Post Treatment Position chair  -JW     In Chair reclined;call light within reach;encouraged to call for assist  -           User Key  (r) = Recorded By, (t) = Taken By, (c) = Cosigned By    Initials Name Provider Type    Jada Vo, PT Physical Therapist               Outcome Measures     Row Name 12/06/22 0835          How much help from another person do you currently need...    Turning from your back to your side while in flat bed without using bedrails? 3  -JW     Moving from lying on back to sitting on the side of a flat bed without bedrails? 3  -JW     Moving to and from a bed to a chair (including a wheelchair)? 3  -JW     Standing up from a chair using your arms (e.g., wheelchair, bedside chair)? 3  -JW     Climbing 3-5 steps with a railing? 3  -JW     To walk in hospital room? 3  -JW     AM-PAC 6 Clicks Score (PT) 18  -JW     Highest level of mobility 6 --> Walked 10 steps or more  -     Row Name 12/06/22 0835          Functional Assessment    Outcome Measure Options AM-PAC 6 Clicks Basic Mobility (PT)  -           User Key  (r) = Recorded By, (t) = Taken  By, (c) = Cosigned By    Initials Name Provider Type    Jada Vo PT Physical Therapist                             Physical Therapy Education     Title: PT OT SLP Therapies (In Progress)     Topic: Physical Therapy (In Progress)     Point: Mobility training (Done)     Learning Progress Summary           Patient Acceptance, E,TB, VU by  at 12/6/2022 0942                   Point: Home exercise program (Not Started)     Learner Progress:  Not documented in this visit.                      User Key     Initials Effective Dates Name Provider Type Discipline     06/16/21 -  Jada Brown PT Physical Therapist PT              PT Recommendation and Plan  Planned Therapy Interventions (PT): balance training, bed mobility training, gait training, home exercise program, strengthening, transfer training, patient/family education  Plan of Care Reviewed With: patient  Outcome Evaluation: Physical therapy evaluation complete.  Patient performs supine to sit with min assist and sit to stand with CGA.  Patient performs gait x10 feet with rolling walker, CGA.  Patient reports significant fatigue with minimal activity.  Patient will benefit from physical therapy to address deficits in functional mobility and activity tolerance.  Anticipate return home with home health, however may benefit from short term rehab if overall mobility does not improve to a sufficient level to return home alone.     Time Calculation:    PT Charges     Row Name 12/06/22 0942             Time Calculation    Start Time 0835  -      Stop Time 0848  -      Time Calculation (min) 13 min  -SUNDEEP      PT Received On 12/06/22  -SUNDEEP      PT - Next Appointment 12/07/22  -SUNDEEP            User Key  (r) = Recorded By, (t) = Taken By, (c) = Cosigned By    Initials Name Provider Type    Jada Vo PT Physical Therapist              Therapy Charges for Today     Code Description Service Date Service Provider Modifiers Qty    [id number]  PT DANILOAL  LOW COMPLEXITY 1 12/6/2022 Jada Brown, PT GP 1          PT G-Codes  Outcome Measure Options: AM-PAC 6 Clicks Basic Mobility (PT)  AM-PAC 6 Clicks Score (PT): 18  PT Discharge Summary  Anticipated Discharge Disposition (PT): home with home health, skilled nursing facility (will continue to assess as patient improves)    Jada Brown, PT  12/6/2022

## 2022-12-06 NOTE — CASE MANAGEMENT/SOCIAL WORK
Continued Stay Note  CEDRIC Huntley     Patient Name: Doris Coreas  MRN: [id number]  Today's Date: 12/6/2022    Admit Date: 12/5/2022    Plan: plan home, lives with grandson   Discharge Plan     Row Name 12/06/22 1559       Plan    Plan plan home, lives with grandson    Patient/Family in Agreement with Plan yes    Plan Comments Waering appropriate PPE, spoke with patient at bedside, she is completing walking oximetry and sits in recliner. Face sheet verified. Patient lives in a patio home and her grandson stays with her. Her daughter is involved and checks on her daily and provides food and transportation. She uses a rw and  02 @ 1L per [facility], she denies additional DME. [facility] has [facility]. She sees Dr Smith as PCP. She uses [facility] and denies issues obtaining medications.  She plans to return home with family to assist at [place]. CM # placed on white board, will continue to follow.               Discharge Codes    No documentation.                     Sina Covarrubias RN

## 2022-12-07 VITALS
SYSTOLIC BLOOD PRESSURE: 126 MMHG | HEART RATE: 70 BPM | HEIGHT: 60 IN | DIASTOLIC BLOOD PRESSURE: 58 MMHG | OXYGEN SATURATION: 96 % | TEMPERATURE: 97.8 F | WEIGHT: 145 LBS | RESPIRATION RATE: 18 BRPM | BODY MASS INDEX: 28.47 KG/M2

## 2022-12-07 PROBLEM — N39.0 E. COLI UTI (URINARY TRACT INFECTION): Status: ACTIVE | Noted: 2022-12-07

## 2022-12-07 PROBLEM — B96.20 E. COLI UTI (URINARY TRACT INFECTION): Status: ACTIVE | Noted: 2022-12-07

## 2022-12-07 PROBLEM — J18.9 PNEUMONIA OF LEFT LUNG DUE TO INFECTIOUS ORGANISM, UNSPECIFIED PART OF LUNG: Status: RESOLVED | Noted: 2022-12-06 | Resolved: 2022-12-07

## 2022-12-07 LAB
ANION GAP SERPL CALCULATED.3IONS-SCNC: 8.1 MMOL/L (ref 5–15)
BACTERIA SPEC AEROBE CULT: ABNORMAL
BASOPHILS # BLD AUTO: 0.05 10*3/MM3 (ref 0–0.2)
BASOPHILS NFR BLD AUTO: 0.7 % (ref 0–1.5)
BUN SERPL-MCNC: 16 MG/DL (ref 8–23)
BUN/CREAT SERPL: 21.6 (ref 7–25)
CALCIUM SPEC-SCNC: 8.4 MG/DL (ref 8.2–9.6)
CHLORIDE SERPL-SCNC: 104 MMOL/L (ref 98–107)
CO2 SERPL-SCNC: 24.9 MMOL/L (ref 22–29)
CREAT SERPL-MCNC: 0.74 MG/DL (ref 0.57–1)
DEPRECATED RDW RBC AUTO: 49.9 FL (ref 37–54)
EGFRCR SERPLBLD CKD-EPI 2021: 75.1 ML/MIN/1.73
EOSINOPHIL # BLD AUTO: 0.49 10*3/MM3 (ref 0–0.4)
EOSINOPHIL NFR BLD AUTO: 7.2 % (ref 0.3–6.2)
ERYTHROCYTE [DISTWIDTH] IN BLOOD BY AUTOMATED COUNT: 15.3 % (ref 12.3–15.4)
GLUCOSE SERPL-MCNC: 96 MG/DL (ref 65–99)
HCT VFR BLD AUTO: 30.7 % (ref 34–46.6)
HGB BLD-MCNC: 9.5 G/DL (ref 12–15.9)
IMM GRANULOCYTES # BLD AUTO: 0.14 10*3/MM3 (ref 0–0.05)
IMM GRANULOCYTES NFR BLD AUTO: 2.1 % (ref 0–0.5)
LYMPHOCYTES # BLD AUTO: 1.24 10*3/MM3 (ref 0.7–3.1)
LYMPHOCYTES NFR BLD AUTO: 18.2 % (ref 19.6–45.3)
MCH RBC QN AUTO: 27.9 PG (ref 26.6–33)
MCHC RBC AUTO-ENTMCNC: 30.9 G/DL (ref 31.5–35.7)
MCV RBC AUTO: 90 FL (ref 79–97)
MONOCYTES # BLD AUTO: 0.56 10*3/MM3 (ref 0.1–0.9)
MONOCYTES NFR BLD AUTO: 8.2 % (ref 5–12)
NEUTROPHILS NFR BLD AUTO: 4.33 10*3/MM3 (ref 1.7–7)
NEUTROPHILS NFR BLD AUTO: 63.6 % (ref 42.7–76)
NRBC BLD AUTO-RTO: 0 /100 WBC (ref 0–0.2)
PLATELET # BLD AUTO: 193 10*3/MM3 (ref 140–450)
PMV BLD AUTO: 10.4 FL (ref 6–12)
POTASSIUM SERPL-SCNC: 4 MMOL/L (ref 3.5–5.2)
RBC # BLD AUTO: 3.41 10*6/MM3 (ref 3.77–5.28)
SODIUM SERPL-SCNC: 137 MMOL/L (ref 136–145)
WBC NRBC COR # BLD: 6.81 10*3/MM3 (ref 3.4–10.8)

## 2022-12-07 PROCEDURE — 96366 THER/PROPH/DIAG IV INF ADDON: CPT

## 2022-12-07 PROCEDURE — 25010000002 CEFTRIAXONE SODIUM-DEXTROSE 1-3.74 GM-%(50ML) RECONSTITUTED SOLUTION: Performed by: HOSPITALIST

## 2022-12-07 PROCEDURE — G0378 HOSPITAL OBSERVATION PER HR: HCPCS

## 2022-12-07 PROCEDURE — 80048 BASIC METABOLIC PNL TOTAL CA: CPT | Performed by: HOSPITALIST

## 2022-12-07 PROCEDURE — 99217 PR OBSERVATION CARE DISCHARGE MANAGEMENT: CPT | Performed by: INTERNAL MEDICINE

## 2022-12-07 PROCEDURE — 85025 COMPLETE CBC W/AUTO DIFF WBC: CPT | Performed by: HOSPITALIST

## 2022-12-07 PROCEDURE — 97116 GAIT TRAINING THERAPY: CPT

## 2022-12-07 RX ORDER — CARVEDILOL 6.25 MG/1
3.12 TABLET ORAL 2 TIMES DAILY WITH MEALS
Start: 2022-12-07

## 2022-12-07 RX ORDER — AMLODIPINE BESYLATE 5 MG/1
5 TABLET ORAL
Qty: 30 TABLET | Refills: 0 | Status: SHIPPED | OUTPATIENT
Start: 2022-12-08

## 2022-12-07 RX ORDER — CEFDINIR 300 MG/1
300 CAPSULE ORAL 2 TIMES DAILY
Qty: 10 CAPSULE | Refills: 0 | Status: SHIPPED | OUTPATIENT
Start: 2022-12-07

## 2022-12-07 RX ADMIN — RIVAROXABAN 15 MG: 15 TABLET, FILM COATED ORAL at 10:02

## 2022-12-07 RX ADMIN — CEFTRIAXONE 1 G: 1 INJECTION, SOLUTION INTRAVENOUS at 00:50

## 2022-12-07 RX ADMIN — FAMOTIDINE 40 MG: 20 TABLET ORAL at 10:01

## 2022-12-07 RX ADMIN — AMLODIPINE BESYLATE 5 MG: 5 TABLET ORAL at 10:01

## 2022-12-07 RX ADMIN — Medication 10 ML: at 10:03

## 2022-12-07 RX ADMIN — ATORVASTATIN CALCIUM 20 MG: 20 TABLET, FILM COATED ORAL at 10:01

## 2022-12-07 RX ADMIN — ISOSORBIDE MONONITRATE 30 MG: 30 TABLET, EXTENDED RELEASE ORAL at 06:37

## 2022-12-07 RX ADMIN — LEVOTHYROXINE SODIUM 75 MCG: 75 TABLET ORAL at 10:02

## 2022-12-07 RX ADMIN — CARVEDILOL 3.12 MG: 3.12 TABLET, FILM COATED ORAL at 10:02

## 2022-12-07 RX ADMIN — DOXYCYCLINE 100 MG: 100 CAPSULE ORAL at 10:02

## 2022-12-07 NOTE — CASE MANAGEMENT/SOCIAL WORK
Continued Stay Note  CEDRIC Huntley     Patient Name: Doris Coreas  MRN: [id number]  Today's Date: 12/7/2022    Admit Date: 12/5/2022    Plan: Home   Discharge Plan     Row Name 12/07/22 0788       Plan    Plan Home    Patient/Family in Agreement with Plan yes    Plan Comments Call received from primary nurse stating that patient does not use oxygen at home. CM went into room and discussed this with patient and her daughter and patient states that no she does not currently use oxygen. Patient is agreeable for CM to arrange her oxygen through [employer] and provided patient with a portable tank for discharge. CM demonstrated use of tank and both patient and her daughter verbalized understanding. Patient was instructed to call [facility] when she gets home so they can deliver the concentrator and she verbalized understanding and [facility]'s phone number was provided. Patient had no other questions or concerns regarding discharge plans. CM called Karli with [facility] regarding oxygen and faxed DWO to office. CM will follow.    Row Name 12/07/22 5744       Plan    Plan Home with grandson    Patient/Family in Agreement with Plan yes    Plan Comments Patient is noted for discharge and will need home health. CM followed with patient today regarding home health and patient states she has used [facility] and would like to use them again. CM also informed patient that [facility] will be dlivering her a oxy mask for her oxygen at home and she verbalized understanding. Patient states she plans on returning home at discharge with her daughter to help as needed, pt's daughter is at bedside and is also agreeable to this discharge plan. Patient and daughter had no other questions regarding discharge plans. RUBEN called Lacie with [facility] with referral and she states they can accept. CM will follow.               Discharge Codes    No documentation.               Expected Discharge Date and Time     Expected  Discharge Date Expected Discharge Time    Dec 7, 2022             Madhuri Marie RN

## 2022-12-07 NOTE — CASE MANAGEMENT/SOCIAL WORK
Case Management Discharge Note      Final Note: Discharged home with [facility]         Selected Continued Care - Discharged on 12/7/2022 Admission date: 12/5/2022 - Discharge disposition: Home or Self Care    Destination    No services have been selected for the patient.              Durable Medical Equipment     Service Provider Selected Services Address Phone Fax Patient Preferred    [facility] [address] [address] [phone] --          Dialysis/Infusion    No services have been selected for the patient.              Home Medical Care     Service Provider Selected Services Address Phone Fax Patient Preferred    [facility] [address] [address] [phone] --          Therapy    No services have been selected for the patient.              Community Resources    No services have been selected for the patient.              Community & DME    No services have been selected for the patient.                       Final Discharge Disposition Code: 06 - home with home health care

## 2022-12-07 NOTE — CASE MANAGEMENT/SOCIAL WORK
Continued Stay Note  CEDRIC Huntley     Patient Name: Doris Coreas  MRN: [id number]  Today's Date: 12/7/2022    Admit Date: 12/5/2022    Plan: Home with grandson   Discharge Plan     Row Name 12/07/22 1109       Plan    Plan Home with grandson    Patient/Family in Agreement with Plan yes    Plan Comments Patient is noted for discharge and will need home health. CM followed with patient today regarding home health and patient states she has used [facility] and would like to use them again. CM also informed patient that [facility] will be delivering her a oxy mask for her oxygen at home and she verbalized understanding. Patient states she plans on returning home at discharge with her daughter to help as needed, pt's daughter is at bedside and is also agreeable to this discharge plan. Patient and daughter had no other questions regarding discharge plans. CM called Lacie with [facility] with referral and she states they can accept. CM will follow.               Discharge Codes    No documentation.               Expected Discharge Date and Time     Expected Discharge Date Expected Discharge Time    Dec 7, 2022             Madhuri Marie RN

## 2022-12-07 NOTE — PLAN OF CARE
Goal Outcome Evaluation:  Plan of Care Reviewed With: patient           Outcome Evaluation: PT: Patient performs sit to stand with SBA and gait x120 feet with CGA.  Patient O2 87% with mobility on room air, improved to 90% once placed on 1 liter.  Patient requires verbal cues for upright posture and proper distance from walker during mobility activities.  Patient reports she plans to return home with daughter checking on her throughout the day.  Recommend home health PT at discharge.

## 2022-12-07 NOTE — PLAN OF CARE
Goal Outcome Evaluation:  Plan of Care Reviewed With: patient        Progress: improving  Outcome Evaluation: Rested well during the night. No complaints of pain or difficulty breathing. Remains sinus rhythm on telemetry. VSS.

## 2022-12-07 NOTE — PLAN OF CARE
Goal Outcome Evaluation:  Plan of Care Reviewed With: patient, daughter           Outcome Evaluation: Patient is ready to be discharged.

## 2022-12-07 NOTE — NURSING NOTE
Discharge instructions discussed with Patient and daughter. Patient verbalized that she does not have oxygen at home. RN notified Madhuri with case management who will set it up. Madhuri to bring a oxygen tank for the drive home. IV removed. Tip intact upon removal. Patient and daughter verbalized understanding of all information given. Once Madhuri MIRANDA brings the oxygen tank for discharge, patient will then be discharged via wheelchair to [facility]

## 2022-12-07 NOTE — THERAPY TREATMENT NOTE
Acute Care - Physical Therapy Treatment Note  CEDRIC Huntley     Patient Name: Doris Coreas  : 1928  MRN: [id number]  Today's Date: 2022      Visit Dx:     ICD-10-CM ICD-9-CM   1. Pneumonia of left lung due to infectious organism, unspecified part of lung  J18.9 486     Patient Active Problem List   Diagnosis   • Essential hypertension   • HLD (hyperlipidemia)   • Stroke (HCC)   • PAF (paroxysmal atrial fibrillation) ([facility])   • Abnormal stress test   • E. coli UTI (urinary tract infection)     Past Medical History:   Diagnosis Date   • Carotid artery stenosis    • Chronic renal insufficiency    • Compression fracture of lumbar vertebra (HCC)    • Difficulty breathing    • Fatigue    • H/O echocardiogram 2015   • Health care maintenance    • History of EKG 2015   • Hyperlipidemia    • Hypertension    • Hypothyroidism    • PAF (paroxysmal atrial fibrillation) (HCC)    • Stroke (HCC)    • Transfusion history 2019     Past Surgical History:   Procedure Laterality Date   • CATARACT EXTRACTION     • CHOLECYSTECTOMY     • HERNIA REPAIR       PT Assessment (last 12 hours)     PT Evaluation and Treatment     Row Name 22 6746          Physical Therapy Time and Intention    Subjective Information complains of;fatigue  -     Document Type therapy note (daily note)  -     Mode of Treatment physical therapy  -     Patient Effort good  -     Symptoms Noted During/After Treatment shortness of breath  -     Row Name 22 4603          General Information    Patient Observations alert;cooperative;agree to therapy  -     Patient/Family/Caregiver Comments/Observations pt sitting recliner, daughter present  -     Existing Precautions/Restrictions fall;oxygen therapy device and L/min  -     Barriers to Rehab none identified  -     Row Name 22 9663          Pain    Pretreatment Pain Rating 0/10 - no pain  -     Posttreatment Pain Rating 0/10 - no pain  -     Row Name  12/07/22 0940          Cognition    Personal Safety Interventions gait belt;nonskid shoes/slippers when out of bed  -     Row Name 12/07/22 0940          Bed Mobility    Comment, (Bed Mobility) deferred up in recliner  -     Row Name 12/07/22 0940          Transfers    Transfers sit-stand transfer;stand-sit transfer  -     Row Name 12/07/22 0940          Sit-Stand Transfer    Sit-Stand [place] (Transfers) supervision;verbal cues  -     Assistive Device (Sit-Stand Transfers) walker, front-wheeled  -     Comment, (Sit-Stand Transfer) cues for hand placement  -     Row Name 12/07/22 0940          Stand-Sit Transfer    Stand-Sit [place] (Transfers) supervision;verbal cues  -     Assistive Device (Stand-Sit Transfers) walker, front-wheeled  -     Comment, (Stand-Sit Transfer) cues for hand placement and controlled descent into sitting  -     Row Name 12/07/22 0940          Gait/Stairs (Locomotion)    [place] Level (Gait) contact guard  -     Assistive Device (Gait) walker, front-wheeled  -     Distance in Feet (Gait) 120  -JW     Pattern (Gait) swing-through  -     Deviations/Abnormal Patterns (Gait) base of support, narrow  -     Bilateral Gait Deviations forward flexed posture  -     Comment, (Gait/Stairs) pt with increased fatigue following mobility activities  -     Row Name 12/07/22 0940          Plan of Care Review    Outcome Evaluation PT: Patient performs sit to stand with SBA and gait x120 feet with CGA.  Patient O2 87% with mobility on room air, improved to 90% once placed on 1 liter.  Patient requires verbal cues for upright posture and proper distance from walker during mobility activities.  Patient reports she plans to return home with daughter checking on her throughout the day.  Recommend home health PT at discharge.  -     Row Name 12/07/22 0940          Vital Signs    Intra SpO2 (%) 87  -     O2 Delivery Intra Treatment room air  -     Post SpO2 (%) 90   -     O2 Delivery Post Treatment supplemental O2  -     Row Name 12/07/22 0940          Positioning and Restraints    Pre-Treatment Position sitting in chair/recliner  -     Post Treatment Position chair  -JW     In Chair reclined;call light within reach;encouraged to call for assist;with family/caregiver;with nsg  -     Row Name 12/07/22 0940          Progress Summary (PT)    Progress Toward Functional Goals (PT) progress toward functional goals is good  -           User Key  (r) = Recorded By, (t) = Taken By, (c) = Cosigned By    Initials Name Provider Type    Jada Vo, RONNA Physical Therapist                Physical Therapy Education     Title: PT OT SLP Therapies (In Progress)     Topic: Physical Therapy (In Progress)     Point: Mobility training (Done)     Learning Progress Summary           Patient Acceptance, E,TB, VU by SUNDEEP at 12/7/2022 1032    Acceptance, E,TB, VU by SUNDEEP at 12/6/2022 0942                   Point: Home exercise program (Not Started)     Learner Progress:  Not documented in this visit.                      User Key     Initials Effective Dates Name Provider Type Discipline     06/16/21 -  Jada Brown, RONNA Physical Therapist PT              PT Recommendation and Plan  Anticipated Discharge Disposition (PT): home with home health  Planned Therapy Interventions (PT): balance training, bed mobility training, gait training, home exercise program, strengthening, transfer training, patient/family education  Therapy Frequency (PT): 6 times/wk  Progress Summary (PT)  Progress Toward Functional Goals (PT): progress toward functional goals is good  Plan of Care Reviewed With: patient  Outcome Evaluation: PT: Patient performs sit to stand with SBA and gait x120 feet with CGA.  Patient O2 87% with mobility on room air, improved to 90% once placed on 1 liter.  Patient requires verbal cues for upright posture and proper distance from walker during mobility activities.  Patient reports  she plans to return home with daughter checking on her throughout the day.  Recommend home health PT at discharge.   Outcome Measures     Row Name 12/07/22 0940             How much help from another person do you currently need...    Turning from your back to your side while in flat bed without using bedrails? 3  -JW      Moving from lying on back to sitting on the side of a flat bed without bedrails? 3  -JW      Moving to and from a bed to a chair (including a wheelchair)? 3  -JW      Standing up from a chair using your arms (e.g., wheelchair, bedside chair)? 3  -JW      Climbing 3-5 steps with a railing? 3  -JW      To walk in hospital room? 3  -JW      AM-PAC 6 Clicks Score (PT) 18  -JW         Functional Assessment    Outcome Measure Options AM-PAC 6 Clicks Basic Mobility (PT)  -JW            User Key  (r) = Recorded By, (t) = Taken By, (c) = Cosigned By    Initials Name Provider Type    Jada Vo, PT Physical Therapist                 Time Calculation:    PT Charges     Row Name 12/07/22 1033             Time Calculation    Start Time 0940  -JW      Stop Time 1000  -JW      Time Calculation (min) 20 min  -JW      PT Received On 12/07/22  -JW      PT - Next Appointment 12/08/22  -JW         Timed Charges    07572 - Gait Training Minutes  20  -JW         Total Minutes    Timed Charges Total Minutes 20  -JW       Total Minutes 20  -JW            User Key  (r) = Recorded By, (t) = Taken By, (c) = Cosigned By    Initials Name Provider Type    Jada Vo, PT Physical Therapist              Therapy Charges for Today     Code Description Service Date Service Provider Modifiers Qty    87968043582 HC PT EVAL LOW COMPLEXITY 1 12/6/2022 Jada Brown, PT GP 1    67737987994 HC GAIT TRAINING EA 15 MIN 12/7/2022 Jada Brown, PT GP 1          PT G-Codes  Outcome Measure Options: AM-PAC 6 Clicks Basic Mobility (PT)  AM-PAC 6 Clicks Score (PT): 18    Jada Brown PT  12/7/2022

## 2022-12-07 NOTE — DISCHARGE INSTR - APPOINTMENTS
Patient will have to call Dr. Garcia office to make 1 week follow up appointment             [phone]

## 2022-12-07 NOTE — DISCHARGE SUMMARY
Doris Coreas  11/12/1928  [id number]    Hospitalists Discharge Summary    Date of Admission: 12/5/2022  Date of Discharge:  12/7/2022    History of Present Illness from [facility] [place] on admit: Doris Coreas is a 89 y.o. female who presented to [facility] on 12/5/2022 complaining of having some difficulty in breathing, patient noted hypoxic in ER requiring supplemental oxygen.  Underwent work-up revealing normal proBNP, chest x-ray revealed possible infiltrate in upper lobes, patient noted to have peripheral leukocytosis and also UA revealing WBC elevated at.  Patient received IV Rocephin and doxycycline in ER, following this asked with the patient for the further care.  Patient noted to have significant crackles in both lung fields.    Primary Discharge diagnoses: Chronic hypoxic respiratory failure and subjective shortness of breath secondary to community-acquired pneumonia-improved and back to baseline oxygen requirements.     Secondary Discharge Diagnoses: Acute E. coli UTI-pansensitive, discharged with oral Omnicef.  Hypertension-stable after adjustments made in home medications to include increased dose of amlodipine.  Hypothyroidism-continue home Synthroid no acute issues.  PAF-stable on home Xarelto.     Hospital Course Summary: Patient was admitted for feeling generally weak, found to have community-acquired pneumonia on CT chest as well as UTI, would later be identified as having E. coli UTI which is pansensitive.  She was treated throughout her stay with IV Rocephin and doxycycline.  Her shortness of breath improved and walking oximetry on 12/6/2020 to confirm she requires just 1 L with exertion and patient has oxygen supplies at home, case management will arrange oxi mask at discharge.  Patient was prescribed oral Omnicef for 5 days to treat her E. coli UTI as well as pneumonia.  Physical therapy evaluated the patient and recommended home health therapy which will be arranged upon discharge by case  management . on 12/7/2022 patient's condition had improved. They were deemed stable for discharge. They were advised to take all medications as prescribed, follow up with PCP within 1 week. If there are any issues patient should contact PCP and/or return to the ED for follow up. Patient was agreeable to the plan and subsequently discharged at this time.     PCP  Patient Care Team:  Leeann Smith MD as PCP - General (Family Medicine)    Consults:   Consults     No orders found from 11/6/2022 to 12/6/2022.          Operations and Procedures Performed:    12/06 1218 Walking Oximetry  Walking Oximetry    Result Date: 12/6/2022  Narrative: Carolina Song, CRT     12/6/2022 12:24 PM Exercise Oximetry Patient Name:Doris Coreas MRN: [id number] Date: 12/06/22         ROOM AIR BASELINE SpO2% 87 Heart Rate 75 Blood Pressure  EXERCISE ON ROOM AIR SpO2% EXERCISE ON O2 @ 1 LPM SpO2% 1 MINUTE  1 MINUTE 90 2 MINUTES  2 MINUTES 91 3 MINUTES  3 MINUTES 90 4 MINUTES  4 MINUTES 92 5 MINUTES  5 MINUTES 91 6 MINUTES  6 MINUTES 90          Distance Walked   Distance Walked 100ft Dyspnea (Earlene Scale)   Dyspnea (Earlene Scale) Fatigue (Earlene Scale)   Fatigue (Earlene Scale) SpO2% Post Exercise   SpO2% Post Exercise 94 HR Post Exercise   HR Post Exercise 86 Time to Recovery   Time to Recovery 1 minute Comments: Forehead probe and rolling walker used during walk.  Pt required rest periods approximately every 30 ft. Resting SpO2 on 1lpm O2 94%.    XR Chest 2 View    Result Date: 12/5/2022  Narrative: CR Chest 2 Vws INDICATION:  Shortness of air COMPARISON:  4/23/2020 FINDINGS: PA and lateral views of the chest.  Size normal. There are faint patchy upper lobe infiltrates. Bones show degenerative changes.      Impression: Subtle faint bilateral patchy upper lobe infiltrates Signer Name: Herbert Garcia MD  Signed: 12/5/2022 11:19 PM  [email or web address]  [facility]    CT Chest Without Contrast  Diagnostic    Result Date: 12/6/2022  Narrative: CT CHEST WITHOUT CONTRAST 12/6/2022  HISTORY: 89-year-old female admitted for back pain shortness of air and pneumonia.  TECHNIQUE:  Noncontrast CT chest was performed. Comparison studies chest x-ray 12/5/2022 and CT chest 7/6/2022 Radiation dose reduction techniques included automated exposure control or exposure modulation based on body size. Radiation audit for CT and nuclear cardiology exams in the last 12 months: 3.  FINDINGS:  The lungs are emphysematous. There is no pleural effusion. There are scattered areas of fibrosis and scarring. Apical predominant fibrosis bilaterally left right. There are new patchy groundglass opacities in the left upper lobe and there is a new subpleural groundglass opacity in the left lower lobe. While nonspecific, imaging findings most likely reflect inflammatory or infectious infiltrates. There is a tiny 2 mm micronodule in the right upper lobe on image 19. This may be faintly calcified and is likely to be benign.  The central airways are patent. The heart is borderline in size. Aorta shows atherosclerotic change. No pericardial effusion there are coronary artery calcifications. Probable reactive mediastinal lymph nodes unchanged. There is a hiatal hernia. There is a large duodenal diverticulum. The gallbladder is surgically absent and the kidneys are nonobstructed. Diverticulosis. The bones are osteoporotic. There is a chronic L1 compression fracture with posterior retropulsion of the posterior superior endplate and associated central canal stenosis. This appears unchanged.  negative.       Impression: 1. Patchy upper lobe pneumonia on the left and probable subsegmental pneumonia in the left lower lobe. No associated effusion. Follow-up to clearing is recommended. 2. Background emphysematous changes and old healed granulomatous disease. 3. Probable reactive lymph nodes in the mediastinum. 4. Hiatal hernia. 5. 2 mm noncalcified  nodule in the right upper lobe, likely to be benign. Fleischner recommendations follow.  Management recommendation: Based on current published guidelines, uncomplicated pulmonary nodules less than 6 mm do not require CT follow-up. In high risk patients, follow-up CT in 12 months is optional. (Fleischner Society guidelines, 2017).  This report was finalized on 12/6/2022 9:59 AM by Dr. Duane Nath MD.        Allergies:  is allergic to azithromycin, levofloxacin, losartan, losartan potassium, and macrodantin [nitrofurantoin].    Markell  Reviewed     Discharge Medications:     Discharge Medications      New Medications      Instructions Start Date   amLODIPine 5 MG tablet  Commonly known as: NORVASC   5 mg, Oral, Every 24 Hours Scheduled   Start Date: December 8, 2022     cefdinir 300 MG capsule  Commonly known as: OMNICEF   300 mg, Oral, 2 Times Daily         Continue These Medications      Instructions Start Date   carvedilol 6.25 MG tablet  Commonly known as: COREG   3.125 mg, Oral, 2 Times Daily With Meals      famotidine 40 MG tablet  Commonly known as: PEPCID   40 mg, Oral, Daily      furosemide 40 MG tablet  Commonly known as: LASIX   40 mg, Oral, Daily PRN      ipratropium 0.06 % nasal spray  Commonly known as: ATROVENT   2 sprays, Nasal, 4 Times Daily      isosorbide mononitrate 30 MG 24 hr tablet  Commonly known as: IMDUR   TAKE ONE TABLET BY MOUTH EVERY MORNING      levothyroxine 75 MCG tablet  Commonly known as: SYNTHROID, LEVOTHROID   75 mcg, Oral, Daily      miconazole 2 % powder  Commonly known as: MICOTIN   Topical, As Needed      simvastatin 40 MG tablet  Commonly known as: ZOCOR   Nightly      Vitamin B Complex tablet   1 tablet, Oral, Daily      VITAMIN D PO   1 tablet, Oral, Daily      Xarelto 15 MG tablet  Generic drug: rivaroxaban   TAKE ONE TABLET BY MOUTH DAILY         Stop These Medications    amoxicillin-clavulanate 250-125 MG per tablet  Commonly known as: AUGMENTIN            Last Lab  Results:   Lab Results (most recent)     Procedure Component Value Units Date/Time    Urine Culture - Urine, Urine, Clean Catch [[id number]]  (Abnormal)  (Susceptibility) Collected: 12/05/22 2249    Specimen: Urine, Clean Catch Updated: 12/07/22 1012     Urine Culture >100,000 CFU/mL Escherichia coli    Narrative:      Colonization of the urinary tract without infection is common. Treatment is discouraged unless the patient is symptomatic, pregnant, or undergoing an invasive urologic procedure.    Susceptibility      Escherichia coli      JULIA      Ampicillin Resistant     Ampicillin + Sulbactam Resistant     Cefazolin Susceptible      Cefepime Susceptible      Ceftazidime Susceptible      Ceftriaxone Susceptible      Gentamicin Susceptible      Levofloxacin Susceptible      Nitrofurantoin Susceptible      Piperacillin + Tazobactam Susceptible      Trimethoprim + Sulfamethoxazole Susceptible                           Basic Metabolic Panel [[id number]]  (Normal) Collected: 12/07/22 0316    Specimen: Blood Updated: 12/07/22 0425     Glucose 96 mg/dL      BUN 16 mg/dL      Creatinine 0.74 mg/dL      Sodium 137 mmol/L      Potassium 4.0 mmol/L      Chloride 104 mmol/L      CO2 24.9 mmol/L      Calcium 8.4 mg/dL      BUN/Creatinine Ratio 21.6     Anion Gap 8.1 mmol/L      eGFR 75.1 mL/min/1.73      Comment: National Kidney Foundation and American Society of Nephrology (ASN) Task Force recommended calculation based on the Chronic Kidney Disease Epidemiology Collaboration (CKD-EPI) equation refit without adjustment for race.       Narrative:      GFR Normal >60  Chronic Kidney Disease <60  Kidney Failure <15    The GFR formula is only valid for adults with stable renal function between ages 18 and 70.    CBC & Differential [[id number]]  (Abnormal) Collected: 12/07/22 0316    Specimen: Blood Updated: 12/07/22 0420    Narrative:      The following orders were created for panel order CBC & Differential.  Procedure                                Abnormality         Status                     ---------                               -----------         ------                     CBC Auto Differential[[id number]]        Abnormal            Final result                 Please view results for these tests on the individual orders.    CBC Auto Differential [[id number]]  (Abnormal) Collected: 12/07/22 0316    Specimen: Blood Updated: 12/07/22 0420     WBC 6.81 10*3/mm3      RBC 3.41 10*6/mm3      Hemoglobin 9.5 g/dL      Hematocrit 30.7 %      MCV 90.0 fL      MCH 27.9 pg      MCHC 30.9 g/dL      RDW 15.3 %      RDW-SD 49.9 fl      MPV 10.4 fL      Platelets 193 10*3/mm3      Neutrophil % 63.6 %      Lymphocyte % 18.2 %      Monocyte % 8.2 %      Eosinophil % 7.2 %      Basophil % 0.7 %      Immature Grans % 2.1 %      Neutrophils, Absolute 4.33 10*3/mm3      Lymphocytes, Absolute 1.24 10*3/mm3      Monocytes, Absolute 0.56 10*3/mm3      Eosinophils, Absolute 0.49 10*3/mm3      Basophils, Absolute 0.05 10*3/mm3      Immature Grans, Absolute 0.14 10*3/mm3      nRBC 0.0 /100 WBC     Blood Culture - Blood, Arm, Right [[id number]]  (Normal) Collected: 12/06/22 0003    Specimen: Blood from Arm, Right Updated: 12/07/22 0030     Blood Culture No growth at 24 hours    Blood Culture - Blood, Arm, Left [[id number]]  (Normal) Collected: 12/05/22 2354    Specimen: Blood from Arm, Left Updated: 12/07/22 0030     Blood Culture No growth at 24 hours    Respiratory Panel PCR w/COVID-19(SARS-CoV-2) SARAI/CESAR/DAKOTA/PAD/COR/MAD/ABHINAV In-House, NP Swab in UTM/VTM, 3-4 HR TAT - Swab, Nasopharynx [[id number]]  (Normal) Collected: 12/06/22 1105    Specimen: Swab from Nasopharynx Updated: 12/06/22 1645     ADENOVIRUS, PCR Not Detected     Coronavirus 229E Not Detected     Coronavirus HKU1 Not Detected     Coronavirus NL63 Not Detected     Coronavirus OC43 Not Detected     COVID19 Not Detected     Human Metapneumovirus Not Detected     Human Rhinovirus/Enterovirus Not Detected      Influenza A PCR Not Detected     Influenza B PCR Not Detected     Parainfluenza Virus 1 Not Detected     Parainfluenza Virus 2 Not Detected     Parainfluenza Virus 3 Not Detected     Parainfluenza Virus 4 Not Detected     RSV, PCR Not Detected     Bordetella pertussis pcr Not Detected     Bordetella parapertussis PCR Not Detected     Chlamydophila pneumoniae PCR Not Detected     Mycoplasma pneumo by PCR Not Detected    Narrative:      In the setting of a positive respiratory panel with a viral infection PLUS a negative procalcitonin without other underlying concern for bacterial infection, consider observing off antibiotics or discontinuation of antibiotics and continue supportive care. If the respiratory panel is positive for atypical bacterial infection (Bordetella pertussis, Chlamydophila pneumoniae, or Mycoplasma pneumoniae), consider antibiotic de-escalation to target atypical bacterial infection.    Basic Metabolic Panel [[id number]]  (Abnormal) Collected: 12/06/22 0442    Specimen: Blood Updated: 12/06/22 0521     Glucose 112 mg/dL      BUN 18 mg/dL      Creatinine 0.90 mg/dL      Sodium 140 mmol/L      Potassium 3.8 mmol/L      Chloride 105 mmol/L      CO2 25.0 mmol/L      Calcium 8.5 mg/dL      BUN/Creatinine Ratio 20.0     Anion Gap 10.0 mmol/L      eGFR 59.4 mL/min/1.73      Comment: National Kidney Foundation and American Society of Nephrology (ASN) Task Force recommended calculation based on the Chronic Kidney Disease Epidemiology Collaboration (CKD-EPI) equation refit without adjustment for race.       Narrative:      GFR Normal >60  Chronic Kidney Disease <60  Kidney Failure <15    The GFR formula is only valid for adults with stable renal function between ages 18 and 70.    CBC & Differential [[id number]]  (Abnormal) Collected: 12/06/22 0442    Specimen: Blood Updated: 12/06/22 0516    Narrative:      The following orders were created for panel order CBC & Differential.  Procedure                                Abnormality         Status                     ---------                               -----------         ------                     CBC Auto Differential[[id number]]        Abnormal            Final result                 Please view results for these tests on the individual orders.    CBC Auto Differential [[id number]]  (Abnormal) Collected: 12/06/22 0442    Specimen: Blood Updated: 12/06/22 0516     WBC 16.95 10*3/mm3      RBC 3.57 10*6/mm3      Hemoglobin 10.0 g/dL      Hematocrit 31.7 %      MCV 88.8 fL      MCH 28.0 pg      MCHC 31.5 g/dL      RDW 14.9 %      RDW-SD 48.4 fl      MPV 9.8 fL      Platelets 188 10*3/mm3      Neutrophil % 84.9 %      Lymphocyte % 4.5 %      Monocyte % 5.2 %      Eosinophil % 0.4 %      Basophil % 0.1 %      Immature Grans % 4.9 %      Neutrophils, Absolute 14.40 10*3/mm3      Lymphocytes, Absolute 0.76 10*3/mm3      Monocytes, Absolute 0.88 10*3/mm3      Eosinophils, Absolute 0.06 10*3/mm3      Basophils, Absolute 0.02 10*3/mm3      Immature Grans, Absolute 0.83 10*3/mm3     COVID-19 and FLU A/B PCR - Swab, Nasopharynx [[id number]]  (Normal) Collected: 12/06/22 0000    Specimen: Swab from Nasopharynx Updated: 12/06/22 0036     COVID19 Not Detected     Influenza A PCR Not Detected     Influenza B PCR Not Detected    Narrative:      Fact sheet for providers: https://www.fda.gov/media/012211/download    Fact sheet for patients: https://www.fda.gov/media/106031/download    Test performed by PCR.    Lactic Acid, Plasma [[id number]]  (Normal) Collected: 12/05/22 2354    Specimen: Blood Updated: 12/06/22 0026     Lactate 1.4 mmol/L     Procalcitonin [[id number]]  (Normal) Collected: 12/05/22 2222    Specimen: Blood Updated: 12/06/22 0011     Procalcitonin 0.14 ng/mL     Narrative:      As a Marker for Sepsis (Non-Neonates):    1. <0.5 ng/mL represents a low risk of severe sepsis and/or septic shock.  2. >2 ng/mL represents a high risk of severe sepsis and/or septic shock.    As a  "Marker for Lower Respiratory Tract Infections that require antibiotic therapy:    PCT on Admission    Antibiotic Therapy       6-12 Hrs later    >0.5                Strongly Recommended  >0.25 - <0.5        Recommended   0.1 - 0.25          Discouraged              Remeasure/reassess PCT  <0.1                Strongly Discouraged     Remeasure/reassess PCT    As 28 day mortality risk marker: \"Change in Procalcitonin Result\" (>80% or <=80%) if Day 0 (or Day 1) and Day 4 values are available. Refer to http://www.[facility] [place]-pct-calculator.com    Change in PCT <=80%  A decrease of PCT levels below or equal to 80% defines a positive change in PCT test result representing a higher risk for 28-day all-cause mortality of patients diagnosed with severe sepsis for septic shock.    Change in PCT >80%  A decrease of PCT levels of more than 80% defines a negative change in PCT result representing a lower risk for 28-day all-cause mortality of patients diagnosed with severe sepsis or septic shock.       [place] Draw [[id number]] Collected: 12/05/22 2222    Specimen: Blood Updated: 12/05/22 2330    Narrative:      The following orders were created for panel order [place] Draw.  Procedure                               Abnormality         Status                     ---------                               -----------         ------                     Green Top (Gel)[[id number]]                                  Final result               Lavender Top[[id number]]                                     Final result               Light Blue Top[[id number]]                                   Final result                 Please view results for these tests on the individual orders.    Lavender Top [[id number]] Collected: 12/05/22 2222    Specimen: Blood Updated: 12/05/22 2330     Extra Tube hold for add-on     Comment: Auto resulted       Light Blue Top [[id number]] Collected: 12/05/22 2222    Specimen: Blood Updated: 12/05/22 2330     Extra Tube Hold " for add-ons.     Comment: Auto resulted       Green Top (Gel) [[id number]] Collected: 12/05/22 2222    Specimen: Blood Updated: 12/05/22 2330     Extra Tube Hold for add-ons.     Comment: Auto resulted.       Urinalysis, Microscopic Only - Urine, Clean Catch [[id number]]  (Abnormal) Collected: 12/05/22 2249    Specimen: Urine, Clean Catch Updated: 12/05/22 2259     RBC, UA 6-12 /HPF      WBC, UA 0-2 /HPF      Bacteria, UA 4+ /HPF      Squamous Epithelial Cells, UA 3-6 /HPF      Hyaline Casts, UA None Seen /LPF      Methodology Manual Light Microscopy    BNP [[id number]]  (Normal) Collected: 12/05/22 2222    Specimen: Blood Updated: 12/05/22 2257     proBNP 283.4 pg/mL     Narrative:      Among patients with dyspnea, NT-proBNP is highly sensitive for the detection of acute congestive heart failure. In addition NT-proBNP of <300 pg/ml effectively rules out acute congestive heart failure with 99% negative predictive value.    Results may be falsely decreased if patient taking Biotin.      Troponin [[id number]]  (Normal) Collected: 12/05/22 2222    Specimen: Blood Updated: 12/05/22 2257     Troponin T 0.026 ng/mL     Narrative:      Troponin T Reference Range:  <= 0.03 ng/mL-   Negative for AMI  >0.03 ng/mL-     Abnormal for myocardial necrosis.  Clinicians would have to utilize clinical acumen, EKG, Troponin and serial changes to determine if it is an Acute Myocardial Infarction or myocardial injury due to an underlying chronic condition.       Results may be falsely decreased if patient taking Biotin.      Urinalysis With Microscopic If Indicated (No Culture) - Urine, Clean Catch [[id number]]  (Abnormal) Collected: 12/05/22 2249    Specimen: Urine, Clean Catch Updated: 12/05/22 2257     Color, UA Yellow     Appearance, UA Clear     pH, UA 5.5     Specific Gravity, UA 1.025     Glucose, UA Negative     Ketones, UA Negative     Bilirubin, UA Negative     Blood, UA Small (1+)     Protein, UA Negative     Leuk Esterase, UA  Negative     Nitrite, UA Positive     Urobilinogen, UA 0.2 E.U./dL    Comprehensive Metabolic Panel [[id number]]  (Abnormal) Collected: 12/05/22 2222    Specimen: Blood Updated: 12/05/22 2247     Glucose 109 mg/dL      BUN 18 mg/dL      Creatinine 1.16 mg/dL      Sodium 141 mmol/L      Potassium 4.0 mmol/L      Chloride 104 mmol/L      CO2 25.6 mmol/L      Calcium 9.2 mg/dL      Total Protein 7.0 g/dL      Albumin 4.00 g/dL      ALT (SGPT) 12 U/L      AST (SGOT) 25 U/L      Alkaline Phosphatase 111 U/L      Total Bilirubin 0.3 mg/dL      Globulin 3.0 gm/dL      A/G Ratio 1.3 g/dL      BUN/Creatinine Ratio 15.5     Anion Gap 11.4 mmol/L      eGFR 43.8 mL/min/1.73      Comment: National Kidney Foundation and American Society of Nephrology (ASN) Task Force recommended calculation based on the Chronic Kidney Disease Epidemiology Collaboration (CKD-EPI) equation refit without adjustment for race.       Narrative:      GFR Normal >60  Chronic Kidney Disease <60  Kidney Failure <15    The GFR formula is only valid for adults with stable renal function between ages 18 and 70.    Scan Slide [[id number]] Collected: 12/05/22 2222    Specimen: Blood Updated: 12/05/22 2237     RBC Morphology Normal     WBC Morphology Normal     Platelet Estimate Adequate        Imaging Results (Most Recent)     Procedure Component Value Units Date/Time    CT Chest Without Contrast Diagnostic [[id number]] Collected: 12/06/22 0949     Updated: 12/06/22 1001    Narrative:      CT CHEST WITHOUT CONTRAST 12/6/2022     HISTORY:  89-year-old female admitted for back pain shortness of air and  pneumonia.     TECHNIQUE:    Noncontrast CT chest was performed. Comparison studies chest x-ray  12/5/2022 and CT chest 7/6/2022 Radiation dose reduction techniques  included automated exposure control or exposure modulation based on body  size. Radiation audit for CT and nuclear cardiology exams in the last 12  months: 3.      FINDINGS:    The lungs are  emphysematous. There is no pleural effusion. There are  scattered areas of fibrosis and scarring. Apical predominant fibrosis  bilaterally left right. There are new patchy groundglass opacities in  the left upper lobe and there is a new subpleural groundglass opacity in  the left lower lobe. While nonspecific, imaging findings most likely  reflect inflammatory or infectious infiltrates. There is a tiny 2 mm  micronodule in the right upper lobe on image 19. This may be faintly  calcified and is likely to be benign.     The central airways are patent. The heart is borderline in size. Aorta  shows atherosclerotic change. No pericardial effusion there are coronary  artery calcifications. Probable reactive mediastinal lymph nodes  unchanged. There is a hiatal hernia. There is a large duodenal  diverticulum. The gallbladder is surgically absent and the kidneys are  nonobstructed. Diverticulosis. The bones are osteoporotic. There is a  chronic L1 compression fracture with posterior retropulsion of the  posterior superior endplate and associated central canal stenosis. This  appears unchanged.  negative.         Impression:      1. Patchy upper lobe pneumonia on the left and probable subsegmental  pneumonia in the left lower lobe. No associated effusion. Follow-up to  clearing is recommended.  2. Background emphysematous changes and old healed granulomatous  disease.  3. Probable reactive lymph nodes in the mediastinum.  4. Hiatal hernia.  5. 2 mm noncalcified nodule in the right upper lobe, likely to be  benign. Fleischner recommendations follow.     Management recommendation: Based on current published guidelines,  uncomplicated pulmonary nodules less than 6 mm do not require CT  follow-up. In high risk patients, follow-up CT in 12 months is optional.  (Fleischner Society guidelines, 2017).     This report was finalized on 12/6/2022 9:59 AM by Dr. Duane Nath MD.       XR Chest 2 View [[id number]] Collected:  12/05/22 2319     Updated: 12/05/22 2322    Narrative:      CR Chest 2 Vws    INDICATION:    Shortness of air    COMPARISON:    4/23/2020    FINDINGS:   PA and lateral views of the chest.  Size normal. There are faint patchy upper lobe infiltrates. Bones show degenerative changes.        Impression:      Subtle faint bilateral patchy upper lobe infiltrates    Signer Name: Herbert Garcia MD   Signed: 12/5/2022 11:19 PM   Workstation Name: [facility] [facility]    [facility]          PROCEDURES      Condition on Discharge:  Stable, Improved.     Physical Exam at Discharge  Vital Signs  Temp:  [97.5 °F (36.4 °C)-98.9 °F (37.2 °C)] 98.2 °F (36.8 °C)  Heart Rate:  [75-84] 75  Resp:  [18-24] 20  BP: (118-175)/(57-82) 152/68    Physical Exam  Physical Exam:  General: Patient is awake and alert. Elderly female. No acute distress noted.   HENT: Head is atraumatic, normocephalic. Hearing is grossly intact. Nose is without obvious congestion and appears patent. Neck is supple and trachea is midline.   Eyes: Vision is grossly intact. Pupils appear equal and round.   Cardiovascular: Heart has regular rate and rhythm with no murmurs, rubs or gallops noted.   Respiratory: Lungs are clear to ausculation without wheezes, rhonchi or rales.   Abdominal/GI: Soft, nontender, bowel sounds present. No rebound or guarding present.   Extremities: No peripheral edema noted.   Musculoskeletal: Spontaneous movement of bilateral upper and lower extremities against gravity noted. No signs of injury or deformity noted.   Skin: Warm and dry.   Psych: Mood and affect are appropriate. Cooperative with exam.   Neuro: No facial asymmetry noted. No focal deficits noted, hearing and vision are grossly intact.      Discharge Disposition  To home in stable condition     Visiting Nurse:    Yes    Home PT/OT:  Yes    Home Safety Evaluation:  No    DME  Oxygen at 1L with exertion     Discharge Diet:      Dietary Orders (From admission, onward)      Start     Ordered    12/06/22 0714  Diet: Cardiac Diets; Healthy Heart (2-3 Na+); Texture: Regular Texture (IDDSI 7); Fluid Consistency: Thin (IDDSI 0)  Diet Effective Now        References:    Diet Order [facility]   Question Answer Comment   Diets: Cardiac Diets    Cardiac Diet: Healthy Heart (2-3 Na+)    Texture: Regular Texture (IDDSI 7)    Fluid Consistency: Thin (IDDSI 0)        12/06/22 0713                Activity at Discharge:  As tolerated    Pre-discharge education  None      Follow-up Appointments  Future Appointments   Date Time Provider Department Center   1/6/2023  1:20 PM Cuca Grier MD [facility]     Additional Instructions for the Follow-ups that You Need to Schedule     Discharge Follow-up with PCP   As directed       Currently Documented PCP:    Leeann Smith MD    PCP Phone Number:    [phone]     Follow Up Details: wihtin 1 week               Test Results Pending at Discharge  Pending Labs     Order Current Status    Blood Culture - Blood, Arm, Left Preliminary result    Blood Culture - Blood, Arm, Right Preliminary result          Discharge over 30 min (if over 30 minutes give explanation as to why it took greater than 30 minutes)  Secondary to:   Coordination of care/follow up  Medication reconciliation  D/W patient      At [facility], we believe that sharing information builds trust and better relationships. You are receiving this note because you recently visited [facility]. It is possible you will see health information before a provider has talked with you about it. This kind of information can be easy to misunderstand. To help you fully understand what it means for your health, we urge you to discuss this note with your provider.    Kenroy Holliday DO  12/07/22  10:20 EST

## 2022-12-08 ENCOUNTER — READMISSION MANAGEMENT (OUTPATIENT)
Dept: CALL CENTER | Facility: HOSPITAL | Age: 87
End: 2022-12-08

## 2022-12-08 NOTE — OUTREACH NOTE
Prep Survey    Flowsheet Row Responses   [facility] facility patient discharged from? [place]   Is LACE score < 7 ? Yes   Emergency Room discharge w/ pulse ox? No   Eligibility Readm Mgmt   Discharge diagnosis Shortness of breathPneumonia of left lung due to infectious organism, unspecified part of lung   Does the patient have one of the following disease processes/diagnoses(primary or secondary)? Pneumonia   Does the patient have Home health ordered? Yes   What is the Home health agency?  [facility]    Is there a DME ordered? Yes   What DME was ordered? [facility]    Prep survey completed? Yes          AMOR TORO - Registered Nurse

## 2022-12-11 LAB
BACTERIA SPEC AEROBE CULT: NORMAL
BACTERIA SPEC AEROBE CULT: NORMAL

## 2022-12-13 ENCOUNTER — APPOINTMENT (OUTPATIENT)
Dept: GENERAL RADIOLOGY | Facility: HOSPITAL | Age: 87
End: 2022-12-13

## 2022-12-13 ENCOUNTER — HOSPITAL ENCOUNTER (EMERGENCY)
Facility: HOSPITAL | Age: 87
Discharge: HOME | End: 2022-12-13
Attending: EMERGENCY MEDICINE | Admitting: EMERGENCY MEDICINE

## 2022-12-13 VITALS
TEMPERATURE: 98.5 F | BODY MASS INDEX: 28.47 KG/M2 | WEIGHT: 145 LBS | SYSTOLIC BLOOD PRESSURE: 137 MMHG | DIASTOLIC BLOOD PRESSURE: 73 MMHG | OXYGEN SATURATION: 96 % | HEART RATE: 87 BPM | RESPIRATION RATE: 28 BRPM | HEIGHT: 60 IN

## 2022-12-13 DIAGNOSIS — F41.0 PANIC ATTACK: ICD-10-CM

## 2022-12-13 DIAGNOSIS — J18.9 PNEUMONIA OF LEFT LOWER LOBE DUE TO INFECTIOUS ORGANISM: Primary | ICD-10-CM

## 2022-12-13 LAB
ALBUMIN SERPL-MCNC: 3.2 G/DL (ref 3.5–5.2)
ALBUMIN/GLOB SERPL: 1 G/DL
ALP SERPL-CCNC: 112 U/L (ref 39–117)
ALT SERPL W P-5'-P-CCNC: 15 U/L (ref 1–33)
ANION GAP SERPL CALCULATED.3IONS-SCNC: 9.8 MMOL/L (ref 5–15)
AST SERPL-CCNC: 37 U/L (ref 1–32)
BASOPHILS # BLD AUTO: 0.05 10*3/MM3 (ref 0–0.2)
BASOPHILS NFR BLD AUTO: 0.5 % (ref 0–1.5)
BILIRUB SERPL-MCNC: 0.4 MG/DL (ref 0–1.2)
BUN SERPL-MCNC: 10 MG/DL (ref 8–23)
BUN/CREAT SERPL: 13 (ref 7–25)
CALCIUM SPEC-SCNC: 9 MG/DL (ref 8.2–9.6)
CHLORIDE SERPL-SCNC: 97 MMOL/L (ref 98–107)
CO2 SERPL-SCNC: 25.2 MMOL/L (ref 22–29)
CREAT SERPL-MCNC: 0.77 MG/DL (ref 0.57–1)
D-LACTATE SERPL-SCNC: 1.8 MMOL/L (ref 0.5–2)
DEPRECATED RDW RBC AUTO: 48.7 FL (ref 37–54)
EGFRCR SERPLBLD CKD-EPI 2021: 71.6 ML/MIN/1.73
EOSINOPHIL # BLD AUTO: 0.14 10*3/MM3 (ref 0–0.4)
EOSINOPHIL NFR BLD AUTO: 1.3 % (ref 0.3–6.2)
ERYTHROCYTE [DISTWIDTH] IN BLOOD BY AUTOMATED COUNT: 15.1 % (ref 12.3–15.4)
GLOBULIN UR ELPH-MCNC: 3.3 GM/DL
GLUCOSE SERPL-MCNC: 121 MG/DL (ref 65–99)
HCT VFR BLD AUTO: 37 % (ref 34–46.6)
HGB BLD-MCNC: 11.8 G/DL (ref 12–15.9)
IMM GRANULOCYTES # BLD AUTO: 1.82 10*3/MM3 (ref 0–0.05)
IMM GRANULOCYTES NFR BLD AUTO: 17.4 % (ref 0–0.5)
LYMPHOCYTES # BLD AUTO: 0.69 10*3/MM3 (ref 0.7–3.1)
LYMPHOCYTES NFR BLD AUTO: 6.6 % (ref 19.6–45.3)
MCH RBC QN AUTO: 28.4 PG (ref 26.6–33)
MCHC RBC AUTO-ENTMCNC: 31.9 G/DL (ref 31.5–35.7)
MCV RBC AUTO: 88.9 FL (ref 79–97)
MONOCYTES # BLD AUTO: 0.05 10*3/MM3 (ref 0.1–0.9)
MONOCYTES NFR BLD AUTO: 0.5 % (ref 5–12)
NEUTROPHILS NFR BLD AUTO: 7.71 10*3/MM3 (ref 1.7–7)
NEUTROPHILS NFR BLD AUTO: 73.7 % (ref 42.7–76)
NRBC BLD AUTO-RTO: 0.8 /100 WBC (ref 0–0.2)
NT-PROBNP SERPL-MCNC: 326.9 PG/ML (ref 0–1800)
PLATELET # BLD AUTO: 205 10*3/MM3 (ref 140–450)
PMV BLD AUTO: 9.8 FL (ref 6–12)
POTASSIUM SERPL-SCNC: 4.2 MMOL/L (ref 3.5–5.2)
PROCALCITONIN SERPL-MCNC: 0.36 NG/ML (ref 0–0.25)
PROT SERPL-MCNC: 6.5 G/DL (ref 6–8.5)
QT INTERVAL: 328 MS
RBC # BLD AUTO: 4.16 10*6/MM3 (ref 3.77–5.28)
RBC MORPH BLD: NORMAL
SMALL PLATELETS BLD QL SMEAR: ADEQUATE
SODIUM SERPL-SCNC: 132 MMOL/L (ref 136–145)
TROPONIN T SERPL-MCNC: <0.01 NG/ML (ref 0–0.03)
WBC MORPH BLD: NORMAL
WBC NRBC COR # BLD: 10.46 10*3/MM3 (ref 3.4–10.8)

## 2022-12-13 PROCEDURE — 85025 COMPLETE CBC W/AUTO DIFF WBC: CPT | Performed by: EMERGENCY MEDICINE

## 2022-12-13 PROCEDURE — 83605 ASSAY OF LACTIC ACID: CPT | Performed by: EMERGENCY MEDICINE

## 2022-12-13 PROCEDURE — 83880 ASSAY OF NATRIURETIC PEPTIDE: CPT | Performed by: EMERGENCY MEDICINE

## 2022-12-13 PROCEDURE — 25010000002 CEFTRIAXONE SODIUM-DEXTROSE 1-3.74 GM-%(50ML) RECONSTITUTED SOLUTION: Performed by: EMERGENCY MEDICINE

## 2022-12-13 PROCEDURE — 84145 PROCALCITONIN (PCT): CPT | Performed by: EMERGENCY MEDICINE

## 2022-12-13 PROCEDURE — 93005 ELECTROCARDIOGRAM TRACING: CPT | Performed by: EMERGENCY MEDICINE

## 2022-12-13 PROCEDURE — 80053 COMPREHEN METABOLIC PANEL: CPT | Performed by: EMERGENCY MEDICINE

## 2022-12-13 PROCEDURE — 87040 BLOOD CULTURE FOR BACTERIA: CPT | Performed by: EMERGENCY MEDICINE

## 2022-12-13 PROCEDURE — 71046 X-RAY EXAM CHEST 2 VIEWS: CPT

## 2022-12-13 PROCEDURE — 85007 BL SMEAR W/DIFF WBC COUNT: CPT | Performed by: EMERGENCY MEDICINE

## 2022-12-13 PROCEDURE — 99284 EMERGENCY DEPT VISIT MOD MDM: CPT

## 2022-12-13 PROCEDURE — 96365 THER/PROPH/DIAG IV INF INIT: CPT

## 2022-12-13 PROCEDURE — 93010 ELECTROCARDIOGRAM REPORT: CPT | Performed by: STUDENT IN AN ORGANIZED HEALTH CARE EDUCATION/TRAINING PROGRAM

## 2022-12-13 PROCEDURE — 36415 COLL VENOUS BLD VENIPUNCTURE: CPT

## 2022-12-13 PROCEDURE — 84484 ASSAY OF TROPONIN QUANT: CPT | Performed by: EMERGENCY MEDICINE

## 2022-12-13 PROCEDURE — 96367 TX/PROPH/DG ADDL SEQ IV INF: CPT

## 2022-12-13 RX ORDER — SODIUM CHLORIDE 0.9 % (FLUSH) 0.9 %
10 SYRINGE (ML) INJECTION AS NEEDED
Status: DISCONTINUED | OUTPATIENT
Start: 2022-12-13 | End: 2022-12-14 | Stop reason: HOSPADM

## 2022-12-13 RX ORDER — SODIUM CHLORIDE 9 MG/ML
INJECTION, SOLUTION INTRAVENOUS
Status: DISCONTINUED
Start: 2022-12-13 | End: 2022-12-14 | Stop reason: HOSPADM

## 2022-12-13 RX ORDER — CEFTRIAXONE 1 G/50ML
1 INJECTION, SOLUTION INTRAVENOUS ONCE
Status: COMPLETED | OUTPATIENT
Start: 2022-12-13 | End: 2022-12-13

## 2022-12-13 RX ORDER — DOXYCYCLINE 100 MG/10ML
INJECTION, POWDER, LYOPHILIZED, FOR SOLUTION INTRAVENOUS
Status: DISCONTINUED
Start: 2022-12-13 | End: 2022-12-14 | Stop reason: HOSPADM

## 2022-12-13 RX ORDER — DOXYCYCLINE 100 MG/1
100 CAPSULE ORAL 2 TIMES DAILY
Qty: 14 CAPSULE | Refills: 0 | Status: SHIPPED | OUTPATIENT
Start: 2022-12-13 | End: 2022-12-20

## 2022-12-13 RX ADMIN — DOXYCYCLINE 100 MG: 100 INJECTION, POWDER, LYOPHILIZED, FOR SOLUTION INTRAVENOUS at 21:42

## 2022-12-13 RX ADMIN — CEFTRIAXONE 1 G: 1 INJECTION, SOLUTION INTRAVENOUS at 21:05

## 2022-12-14 NOTE — ED PROVIDER NOTES
Subjective     History provided by:  Patient, medical records and EMS personnel    History of Present Illness    · Chief complaint: Shortness of breath    · Location: At home    · Quality/Severity: The patient states she felt like she could not get her breath then.    · Timing/Onset: Started about an hour ago after she finished eating.    · Modifying Factors: Patient unaware of any aggravating or relieving factors.    · Associated symptoms: She denies a cough, fever or chest pain.    · Narrative: The patient is a 89-year-old white female who ate dinner with her daughter and when her daughter left she became short of breath.  EMS was called and states that she sounded congested and administered an albuterol nebulizer treatment.  The patient is normally on home O2 2 L via nasal cannula.  She has a history of paroxysmal A. fib for which she is anticoagulated with Xarelto.  She was recently hospitalized here 12/5-7/2022 for community-acquired pneumonia and UTI and treated with a 5-day course of Omnicef.  She denies a cough or fever at this time.    Review of Systems   Constitutional: Negative for chills and fever.   HENT: Negative for congestion, rhinorrhea and sore throat.    Respiratory: Positive for shortness of breath. Negative for cough.    Cardiovascular: Negative for chest pain.   Gastrointestinal: Negative for abdominal pain, diarrhea, nausea and vomiting.   Genitourinary: Negative for dysuria, frequency, pelvic pain and urgency.   Musculoskeletal: Negative for neck pain and neck stiffness.   Skin: Negative for rash.   Neurological: Negative for dizziness and headaches.     Past Medical History:   Diagnosis Date   • Carotid artery stenosis    • Chronic renal insufficiency    • Compression fracture of lumbar vertebra (HCC)    • Difficulty breathing    • Fatigue    • H/O echocardiogram 05/20/2015   • Health care maintenance    • History of EKG 09/25/2015   • Hyperlipidemia    • Hypertension    • Hypothyroidism   "  • PAF (paroxysmal atrial fibrillation) (HCC)    • Stroke (HCC)    • Transfusion history 07/2019     /73   Pulse 87   Temp 98.5 °F (36.9 °C) (Oral)   Resp 28   Ht 152.4 cm (60\")   Wt 65.8 kg (145 lb)   SpO2 96%   BMI 28.32 kg/m²     Past Medical History:   Diagnosis Date   • Carotid artery stenosis    • Chronic renal insufficiency    • Compression fracture of lumbar vertebra (HCC)    • Difficulty breathing    • Fatigue    • H/O echocardiogram 05/20/2015   • Health care maintenance    • History of EKG 09/25/2015   • Hyperlipidemia    • Hypertension    • Hypothyroidism    • PAF (paroxysmal atrial fibrillation) (HCC)    • Stroke (HCC)    • Transfusion history 07/2019       Allergies   Allergen Reactions   • Azithromycin    • Levofloxacin    • Losartan    • Losartan Potassium    • Macrodantin [Nitrofurantoin] Rash       Past Surgical History:   Procedure Laterality Date   • CATARACT EXTRACTION     • CHOLECYSTECTOMY     • HERNIA REPAIR         Family History   Problem Relation Age of Onset   • Heart failure Mother        Social History     Socioeconomic History   • Marital status:    Tobacco Use   • Smoking status: Never   • Smokeless tobacco: Never   Substance and Sexual Activity   • Alcohol use: No     Comment: caffeine use: 2 cups daily.    • Drug use: No   • Sexual activity: Defer           Objective   Physical Exam  Vitals and nursing note reviewed.   Constitutional:       General: She is not in acute distress.     Appearance: She is not ill-appearing, toxic-appearing or diaphoretic.      Comments: The patient appears in no acute distress and does not appear toxic.  She does appear anxious.  Review of her vital signs: She is afebrile with a temperature of 98.5, tachypneic with a respiratory rate of 28 with an oxygen saturation of 93% on 2 L of oxygen via nasal cannula, heart rate is slightly elevated 101, blood pressure elevated 176/84.   HENT:      Head: Normocephalic and atraumatic.      " Mouth/Throat:      Mouth: Mucous membranes are moist.      Pharynx: Oropharynx is clear.   Eyes:      Pupils: Pupils are equal, round, and reactive to light.   Neck:      Vascular: No JVD.   Cardiovascular:      Rate and Rhythm: Regular rhythm. Tachycardia present.      Heart sounds: No murmur heard.  Pulmonary:      Effort: Pulmonary effort is normal. Tachypnea present.      Breath sounds: Normal breath sounds. No decreased breath sounds, wheezing, rhonchi or rales.   Abdominal:      General: Bowel sounds are normal.      Palpations: Abdomen is soft.      Tenderness: There is no abdominal tenderness.   Musculoskeletal:      Cervical back: Normal range of motion and neck supple.      Comments: Chronic appearing 2+ edema both feet.   Lymphadenopathy:      Cervical: No cervical adenopathy.   Skin:     General: Skin is warm and dry.      Capillary Refill: Capillary refill takes less than 2 seconds.      Findings: No ecchymosis or erythema.   Neurological:      General: No focal deficit present.      Mental Status: She is alert and oriented to person, place, and time.      Cranial Nerves: No cranial nerve deficit.      Motor: No weakness.   Psychiatric:      Comments: Anxious affect.         Procedures           ED Course  ED Course as of 12/13/22 2356 Tue Dec 13, 2022   2021 My interpretation of the patient's EKG tracing performed 20: 11 is normal sinus rhythm with a rate of 99, normal axis, normal conduction, no acute ST segment elevation or depression consistent with ischemia, no ectopy, normal NV and QT intervals.  No change in comparison to tracing 12/5/2022. [TP]   2147 The patient's chest x-ray was interpreted by the radiologist as increased infiltrate of the left middle lobe and a new left lower lobe infiltrate consistent with pneumonia. [TP]   2148 Review the patient's laboratory studies: Her white count is 10.4 with a slight left shift.  Hemoglobin slightly low at 11.8 with a normal hematocrit of 37.   Cardiac troponin is negative.  Lactic acid normal at 1.8.  Procalcitonin elevated at 0.36.  CMP has an elevated glucose of 121 and a slightly low sodium of 132 with a normal potassium of 4.2.  Renal and liver function tests are essentially within normal limits.  2 sets of blood cultures are pending.  proBNP was normal at 327. [TP]   2148 The patient maintaining good oxygen saturations in the 97% on 2 L of oxygen via nasal cannula which he is post to take at home.  Antibiotic therapy for her pneumonia was initiated with Rocephin and doxycycline.  The patient was doing fine an hour prior to developing shortness of breath and calling an ambulance per her daughter and her.  Repeat examination at 21:50 the patient states she now feels fine.  Is my impression the patient likely had a panic attack causing her dyspnea as she was not hypoxic.  Her chest x-ray looks worse than she does.  She does appear stable to be for outpatient therapy.  The patient and her daughter were comfortable with outpatient therapy.  She will be instructed to follow-up with her PCP Dr. Smith later this week.  The patient just completed a 5-day course of Omnicef.  I discharged the patient on doxycycline for 7 days. [TP]      ED Course User Index  [TP] Nik Escobar MD                                           MDM  Number of Diagnoses or Management Options  Panic attack: new and requires workup  Pneumonia of left lower lobe due to infectious organism: new and requires workup     Amount and/or Complexity of Data Reviewed  Clinical lab tests: ordered and reviewed  Tests in the radiology section of CPT®: ordered and reviewed  Tests in the medicine section of CPT®: ordered and reviewed  Review and summarize past medical records: yes    Risk of Complications, Morbidity, and/or Mortality  Presenting problems: high  Diagnostic procedures: high  Management options: high  General comments: My differential diagnosis for dyspnea includes but is not limited  to:  Asthma, COPD, pneumonia, pulmonary embolus, acute respiratory distress syndrome, pneumothorax, pleural effusion, pulmonary fibrosis, congestive heart failure, myocardial infarction, DKA, uremia, acidosis, sepsis, anemia, drug related, hyperventilation, CNS disease, COVID-19, influenza    Patient Progress  Patient progress: stable      Final diagnoses:   Pneumonia of left lower lobe due to infectious organism   Panic attack       ED Disposition  ED Disposition     ED Disposition   Discharge    Condition   Stable    Comment   --             Leeann Smith MD  [address]  [place] [address] [phone]    Schedule an appointment as soon as possible for a visit in 2 days  next available         Medication List      New Prescriptions    doxycycline 100 MG capsule  Commonly known as: MONODOX  Take 1 capsule by mouth 2 (Two) Times a Day for 7 days.           Where to Get Your Medications      These medications were sent to [facility] [id number] - [place] - [address] - [phone]  - [phone] FX [address], [facility] [address]    Phone: [phone]   · doxycycline 100 MG capsule         Labs Reviewed   COMPREHENSIVE METABOLIC PANEL - Abnormal; Notable for the following components:       Result Value    Glucose 121 (*)     Sodium 132 (*)     Chloride 97 (*)     Albumin 3.20 (*)     AST (SGOT) 37 (*)     All other components within normal limits    Narrative:     GFR Normal >60  Chronic Kidney Disease <60  Kidney Failure <15    The GFR formula is only valid for adults with stable renal function between ages 18 and 70.   CBC WITH AUTO DIFFERENTIAL - Abnormal; Notable for the following components:    Hemoglobin 11.8 (*)     Lymphocyte % 6.6 (*)     Monocyte % 0.5 (*)     Immature Grans % 17.4 (*)     Neutrophils, Absolute 7.71 (*)     Lymphocytes, Absolute 0.69 (*)     Monocytes, Absolute 0.05 (*)     Immature Grans, Absolute 1.82 (*)     nRBC 0.8 (*)     All other  "components within normal limits   PROCALCITONIN - Abnormal; Notable for the following components:    Procalcitonin 0.36 (*)     All other components within normal limits    Narrative:     As a Marker for Sepsis (Non-Neonates):    1. <0.5 ng/mL represents a low risk of severe sepsis and/or septic shock.  2. >2 ng/mL represents a high risk of severe sepsis and/or septic shock.    As a Marker for Lower Respiratory Tract Infections that require antibiotic therapy:    PCT on Admission    Antibiotic Therapy       6-12 Hrs later    >0.5                Strongly Recommended  >0.25 - <0.5        Recommended   0.1 - 0.25          Discouraged              Remeasure/reassess PCT  <0.1                Strongly Discouraged     Remeasure/reassess PCT    As 28 day mortality risk marker: \"Change in Procalcitonin Result\" (>80% or <=80%) if Day 0 (or Day 1) and Day 4 values are available. Refer to http://www[place] [place]    Change in PCT <=80%  A decrease of PCT levels below or equal to 80% defines a positive change in PCT test result representing a higher risk for 28-day all-cause mortality of patients diagnosed with severe sepsis for septic shock.    Change in PCT >80%  A decrease of PCT levels of more than 80% defines a negative change in PCT result representing a lower risk for 28-day all-cause mortality of patients diagnosed with severe sepsis or septic shock.      BNP (IN-HOUSE) - Normal    Narrative:     Among patients with dyspnea, NT-proBNP is highly sensitive for the detection of acute congestive heart failure. In addition NT-proBNP of <300 pg/ml effectively rules out acute congestive heart failure with 99% negative predictive value.    Results may be falsely decreased if patient taking Biotin.     TROPONIN (IN-HOUSE) - Normal    Narrative:     Troponin T Reference Range:  <= 0.03 ng/mL-   Negative for AMI  >0.03 ng/mL-     Abnormal for myocardial necrosis.  Clinicians would have to utilize clinical acumen, EKG, " Troponin and serial changes to determine if it is an Acute Myocardial Infarction or myocardial injury due to an underlying chronic condition.       Results may be falsely decreased if patient taking Biotin.     LACTIC ACID, PLASMA - Normal   BLOOD CULTURE   BLOOD CULTURE   SCAN SLIDE   CBC AND DIFFERENTIAL    Narrative:     The following orders were created for panel order CBC & Differential.  Procedure                               Abnormality         Status                     ---------                               -----------         ------                     CBC Auto Differential[[id number]]        Abnormal            Final result               Scan Slide[[id number]]                                       Final result                 Please view results for these tests on the individual orders.     XR Chest 2 View   Final Result   There is increased infiltrate in the left midlung field and a new infiltrate in the left lower lobe when compared to the previous exam. Findings consistent with either pneumonia or aspiration pneumonitis.      Signer Name: Stanley Pappas MD    Signed: 12/13/2022 8:37 PM    Workstation Name: [id number]     [facility]             Medication List      New Prescriptions    doxycycline 100 MG capsule  Commonly known as: MONODOX  Take 1 capsule by mouth 2 (Two) Times a Day for 7 days.           Where to Get Your Medications      These medications were [id number] [facility] [address] - [phone]  - [phone] [address] [facility] [address]    Phone: [phone]   · doxycycline 100 MG capsule              Nik Escobar MD  12/13/22 8087

## 2022-12-14 NOTE — ED NOTES
"Pt presents with c/o sudden SOB tonight while sitting on the couch. She wears home o2 at 2L . She was recently admitted here for pna. She says her home health nurse told her her lungs are sounding better but says she feels SOB all the time. She has not been taking her lasix as she says she has incontinence issues and urine will just \"run out of me constantly\". Pt has bilateral 3+edema which she says is at her baseline. She is A&Ox4  "

## 2022-12-18 LAB
BACTERIA SPEC AEROBE CULT: NORMAL
BACTERIA SPEC AEROBE CULT: NORMAL

## 2022-12-31 ENCOUNTER — APPOINTMENT (OUTPATIENT)
Dept: GENERAL RADIOLOGY | Facility: HOSPITAL | Age: 87
End: 2022-12-31
Payer: MEDICARE

## 2022-12-31 ENCOUNTER — HOSPITAL ENCOUNTER (EMERGENCY)
Facility: HOSPITAL | Age: 87
Discharge: HOME | End: 2022-12-31
Attending: EMERGENCY MEDICINE | Admitting: EMERGENCY MEDICINE
Payer: MEDICARE

## 2022-12-31 VITALS
DIASTOLIC BLOOD PRESSURE: 63 MMHG | RESPIRATION RATE: 16 BRPM | OXYGEN SATURATION: 98 % | SYSTOLIC BLOOD PRESSURE: 131 MMHG | BODY MASS INDEX: 27 KG/M2 | HEIGHT: 62 IN | HEART RATE: 92 BPM | TEMPERATURE: 97.8 F | WEIGHT: 146.7 LBS

## 2022-12-31 DIAGNOSIS — R91.8 LEFT UPPER LOBE PULMONARY INFILTRATE: ICD-10-CM

## 2022-12-31 DIAGNOSIS — M89.8X1 PAIN OF LEFT SCAPULA: Primary | ICD-10-CM

## 2022-12-31 PROCEDURE — 73010 X-RAY EXAM OF SHOULDER BLADE: CPT

## 2022-12-31 PROCEDURE — 99283 EMERGENCY DEPT VISIT LOW MDM: CPT

## 2022-12-31 PROCEDURE — 73030 X-RAY EXAM OF SHOULDER: CPT

## 2022-12-31 RX ORDER — DOXYCYCLINE 100 MG/1
100 CAPSULE ORAL 2 TIMES DAILY
Qty: 14 CAPSULE | Refills: 0 | Status: SHIPPED | OUTPATIENT
Start: 2022-12-31 | End: 2023-01-07

## 2022-12-31 RX ORDER — TRAMADOL HYDROCHLORIDE 50 MG/1
50 TABLET ORAL EVERY 6 HOURS PRN
Qty: 16 TABLET | Refills: 0 | Status: SHIPPED | OUTPATIENT
Start: 2022-12-31

## 2022-12-31 RX ORDER — DOXYCYCLINE 100 MG/1
100 CAPSULE ORAL ONCE
Status: COMPLETED | OUTPATIENT
Start: 2022-12-31 | End: 2022-12-31

## 2022-12-31 RX ORDER — TRAMADOL HYDROCHLORIDE 50 MG/1
50 TABLET ORAL ONCE
Status: COMPLETED | OUTPATIENT
Start: 2022-12-31 | End: 2022-12-31

## 2022-12-31 RX ADMIN — DOXYCYCLINE 100 MG: 100 CAPSULE ORAL at 16:48

## 2022-12-31 RX ADMIN — TRAMADOL HYDROCHLORIDE 50 MG: 50 TABLET, FILM COATED ORAL at 16:48

## 2022-12-31 NOTE — ED PROVIDER NOTES
Subjective     History provided by:  Patient and relative (Daughter)    History of Present Illness    · Chief complaint: Scapula pain    · Location: Posterior mid left scapula.    · Quality/Severity: Sharp pain.    · Timing/Onset: Started yesterday.    · Modifying Factors: Shoulder causes pain in the scapula.    · Associated symptoms: No shortness of breath.  No pain or numbness radiating into the left upper extremity.  No fever.  No cough.    · Narrative: The patient is a 89-year-old white female complaining of posterior left scapular pain that started yesterday.  She denies a history of trauma.    Review of Systems   Constitutional: Negative for activity change, appetite change, chills and fever.   HENT: Negative for congestion, rhinorrhea and sore throat.    Respiratory: Negative for cough and shortness of breath.    Cardiovascular: Negative for chest pain.   Gastrointestinal: Positive for diarrhea ( 1 episode today.). Negative for abdominal pain, nausea and vomiting.   Genitourinary: Negative for dysuria and pelvic pain.   Musculoskeletal: Negative for back pain, neck pain and neck stiffness.   Skin: Negative for rash.   Neurological: Negative for dizziness and headaches.     Past Medical History:   Diagnosis Date   • Carotid artery stenosis    • Chronic renal insufficiency    • Compression fracture of lumbar vertebra (HCC)    • Difficulty breathing    • Fatigue    • H/O echocardiogram 05/20/2015   • Health care maintenance    • History of EKG 09/25/2015   • Hyperlipidemia    • Hypertension    • Hypothyroidism    • PAF (paroxysmal atrial fibrillation) ([facility])    • Stroke (HCC)    • Transfusion history 07/2019     /63 (BP Location: Right arm, Patient Position: Lying)   Pulse 92   Temp 97.8 °F (36.6 °C) (Oral)   Resp 16   Ht 157.5 cm (62\")   Wt 66.5 kg (146 lb 11.2 oz)   SpO2 98%   BMI 26.83 kg/m²     Past Medical History:   Diagnosis Date   • Carotid artery stenosis    • Chronic renal insufficiency     • Compression fracture of lumbar vertebra (HCC)    • Difficulty breathing    • Fatigue    • H/O echocardiogram 05/20/2015   • Health care maintenance    • History of EKG 09/25/2015   • Hyperlipidemia    • Hypertension    • Hypothyroidism    • PAF (paroxysmal atrial fibrillation) (HCC)    • Stroke (HCC)    • Transfusion history 07/2019       Allergies   Allergen Reactions   • Azithromycin    • Levofloxacin    • Losartan    • Losartan Potassium    • Macrodantin [Nitrofurantoin] Rash       Past Surgical History:   Procedure Laterality Date   • CATARACT EXTRACTION     • CHOLECYSTECTOMY     • HERNIA REPAIR         Family History   Problem Relation Age of Onset   • Heart failure Mother        Social History     Socioeconomic History   • Marital status:    Tobacco Use   • Smoking status: Never   • Smokeless tobacco: Never   Substance and Sexual Activity   • Alcohol use: No     Comment: caffeine use: 2 cups daily.    • Drug use: No   • Sexual activity: Defer           Objective   Physical Exam  Vitals and nursing note reviewed.   Constitutional:       General: She is not in acute distress.     Appearance: Normal appearance. She is normal weight. She is not ill-appearing, toxic-appearing or diaphoretic.      Comments: The patient appears healthy in no acute distress.  Review of her vital signs: She is afebrile and all her vital signs are within normal limits.   HENT:      Head: Normocephalic and atraumatic.      Nose: Nose normal.      Mouth/Throat:      Mouth: Mucous membranes are moist.      Pharynx: Oropharynx is clear.   Eyes:      General: No scleral icterus.     Conjunctiva/sclera: Conjunctivae normal.   Cardiovascular:      Rate and Rhythm: Normal rate and regular rhythm.      Heart sounds: No murmur heard.  Pulmonary:      Effort: Pulmonary effort is normal.      Breath sounds: Normal breath sounds. No wheezing or rhonchi.   Musculoskeletal:      Cervical back: Normal range of motion and neck supple. No  tenderness.      Comments: No tenderness or deformity of the left shoulder.  Good range of motion of the left shoulder.  Soft tissues tenderness of the mid left scapula without underlying swelling or erythema.   Lymphadenopathy:      Cervical: No cervical adenopathy.   Skin:     General: Skin is warm and dry.      Capillary Refill: Capillary refill takes less than 2 seconds.      Findings: No erythema or rash.   Neurological:      General: No focal deficit present.      Mental Status: She is alert and oriented to person, place, and time.      Cranial Nerves: No cranial nerve deficit.      Sensory: No sensory deficit.      Motor: No weakness.   Psychiatric:         Mood and Affect: Mood normal.         Behavior: Behavior normal.         Thought Content: Thought content normal.         Judgment: Judgment normal.         Procedures           ED Course  ED Course as of 12/31/22 1741   Sat Dec 31, 2022   1630 LeftX-rays of the left shoulder and scapula showed chronic degenerative changes of the shoulder without acute osseous abnormality.  Background received persistent left upper lobe infiltrate that is similar to what was seen on the chest x-ray 12/13/2022. [TP]   1738 The patient has a chronic infiltrate of the left upper lobe.  She states she only has a \"little tickle\" of a cough on occasion.  She denies a malu deep cough or shortness of breath.  I will place her on another week of doxycycline.  She will be prescribed Ultram for pain.  She is to follow-up with her PCP. [TP]   1739 The patient is tender over her left scapula.  I presume the pain is really due to soft tissue as opposed to an osseous abnormality or an underlying pulmonary infection. [TP]      ED Course User Index  [TP] Nik Escobar MD                                   [facility] reviewed by Nik Escobar MD       Medical Decision Making  Documented in the ED course    Left upper lobe pulmonary infiltrate: acute illness or injury  Pain of left  scapula: acute illness or injury  Amount and/or Complexity of Data Reviewed  Radiology: ordered. Decision-making details documented in ED Course.      Risk  Prescription drug management.          Final diagnoses:   Pain of left scapula   Left upper lobe pulmonary infiltrate       ED Disposition  ED Disposition     ED Disposition   Discharge    Condition   Stable    Comment   --             Leeann Smith MD  [address]  [address]  [facility] [place] [address]  [phone]    Schedule an appointment as soon as possible for a visit in 5 days           Medication List      New Prescriptions    doxycycline 100 MG capsule  Commonly known as: MONODOX  Take 1 capsule by mouth 2 (Two) Times a Day for 7 days.     traMADol 50 MG tablet  Commonly known as: ULTRAM  Take 1 tablet by mouth Every 6 (Six) Hours As Needed for Moderate Pain or Severe Pain.           Where to Get Your Medications      These medications were sent to [facility] [id number] - [place] - [address] - [phone]  - [phone]  [address], [facility] [address]    Phone: [phone]   · doxycycline 100 MG capsule  · traMADol 50 MG tablet         Labs Reviewed - No data to display  XR Shoulder 2+ View Left   Final Result      1. Chronic degenerative changes in the shoulder with no acute abnormalities identified.   2. Persistent left upper lobe infiltrates, similar in appearance to 12/13/2022 chest x-ray.      Signer Name: Stanley Arredondo MD    Signed: 12/31/2022 4:26 PM    Workstation Name: [id number]     Radiology [facility]      XR Scapula Left   Final Result      1. Chronic degenerative changes in the shoulder with no acute abnormalities identified.   2. Persistent left upper lobe infiltrates, similar in appearance to 12/13/2022 chest x-ray.      Signer Name: Stanley Arredondo MD    Signed: 12/31/2022 4:26 PM    Workstation Name: [id number]     Radiology [facility]             Medication List       New Prescriptions    doxycycline 100 MG capsule  Commonly known as: MONODOX  Take 1 capsule by mouth 2 (Two) Times a Day for 7 days.     traMADol 50 MG tablet  Commonly known as: ULTRAM  Take 1 tablet by mouth Every 6 (Six) Hours As Needed for Moderate Pain or Severe Pain.           Where to Get Your Medications      These medications were sent to [facility] [id number] - [place] - [address] - [phone]  - [phone]   [address] [place] [address]    Phone: [phone]   · doxycycline 100 MG capsule  · traMADol 50 MG tablet              Nik Escobar MD  12/31/22 9007

## 2023-05-15 RX ORDER — ISOSORBIDE MONONITRATE 30 MG/1
30 TABLET, EXTENDED RELEASE ORAL EVERY MORNING
Qty: 90 TABLET | Refills: 1 | Status: SHIPPED | OUTPATIENT
Start: 2023-05-15

## 2023-05-15 NOTE — TELEPHONE ENCOUNTER
Patient is requesting a refill of the isosorbide [facility] [place].    NOV-01/06/23-ESTEFANY  LOV-06/28/22-SUHK    Plan:           1. Hypertension -stable-recheck 146/64.  2. No PERLA, tested 2014.  3. Mild bilateral carotid stenosis. Stable  4. Hyperlipidemia -continue lipid-lowering therapy.  She remains on simvastatin 40 mg daily.  5. Back pain, stable. Severe kyphosis  6. CVA due to PAF 5/2017.  on xarelto 15mg daily with food.   7. Dyspnea on exertion with LE edema.  This is likely due to deconditioning, diastolic dysfunction and her severe kyphosis.  She states this is unchanged but she is more fatigued.  8.  Heart murmur-sounds more pronounced.  Going to check an echo to help guide medical therapy.  She really is not tolerating Lasix as she is very incontinent and this just makes it worse.  She is only taking Lasix twice a week at the most.     Check echo   RTO in 6 months with ESTEFANY

## 2023-09-21 ENCOUNTER — OFFICE VISIT (OUTPATIENT)
Dept: CARDIOLOGY | Facility: CLINIC | Age: 88
End: 2023-09-21
Payer: MEDICARE

## 2023-09-21 VITALS
OXYGEN SATURATION: 99 % | WEIGHT: 150 LBS | SYSTOLIC BLOOD PRESSURE: 132 MMHG | DIASTOLIC BLOOD PRESSURE: 70 MMHG | HEART RATE: 84 BPM | HEIGHT: 62 IN | BODY MASS INDEX: 27.6 KG/M2

## 2023-09-21 DIAGNOSIS — I63.411 CEREBROVASCULAR ACCIDENT (CVA) DUE TO EMBOLISM OF RIGHT MIDDLE CEREBRAL ARTERY: ICD-10-CM

## 2023-09-21 DIAGNOSIS — I10 ESSENTIAL HYPERTENSION: ICD-10-CM

## 2023-09-21 DIAGNOSIS — I48.0 PAF (PAROXYSMAL ATRIAL FIBRILLATION): ICD-10-CM

## 2023-09-21 DIAGNOSIS — R60.0 LOWER EXTREMITY EDEMA: Primary | ICD-10-CM

## 2023-09-21 DIAGNOSIS — B96.20 E. COLI UTI (URINARY TRACT INFECTION): ICD-10-CM

## 2023-09-21 DIAGNOSIS — E78.2 MIXED HYPERLIPIDEMIA: ICD-10-CM

## 2023-09-21 DIAGNOSIS — N39.0 E. COLI UTI (URINARY TRACT INFECTION): ICD-10-CM

## 2023-09-21 RX ORDER — CARVEDILOL 6.25 MG/1
6.25 TABLET ORAL 2 TIMES DAILY WITH MEALS
Start: 2023-09-21

## 2023-09-21 RX ORDER — BUMETANIDE 1 MG/1
1 TABLET ORAL DAILY
Qty: 30 TABLET | Refills: 1 | Status: SHIPPED | OUTPATIENT
Start: 2023-09-21

## 2023-09-21 NOTE — PROGRESS NOTES
CARDIOLOGY    Date of Office Visit: 2023  Patient Name: Doris Coreas  : 1928  Encounter Provider: Nate Lovett PA-C  Primary Cardiologist: Cuca Grier MD    CHIEF COMPLAINT / REASON FOR OFFICE VISIT     Leg edema      HISTORY OF PRESENT ILLNESS     This is a 89 y.o. year old female who presents to [facility] for a  follow-up in regards to worsening lower extremity edema.    She suffered a right MCA stroke in May 2017.  Bubble study at the time on TTE was negative.  After this she had a Zio patch which showed multiple episodes of paroxysmal atrial fibrillation that was decided to be the source of her stroke.  Her Plavix has been discontinued and she has been on Xarelto 15 mg since.  Today she denies any bleeding complications.    She saw her PCP on 9/15/2023 and was noted to have worsening lower extremity ankle edema over the past 2 weeks.  She had stopped taking her Lasix daily due to issues with incontinence.  She had only been taking it twice a week but recently increased it back to daily with lower extremity edema worsened.  Her PCP had ordered a chest x-ray and NT proBNP and referred her back to our office for evaluation.  She has been suffering from recurrent UTIs and was unable to undergo cystoscopy with nephrology due to her age.    Today the patient reports she has had worsening lower extremity edema for the past 3-4 weeks. She is not having as large of a response to her lasix as she has had prior. Her PCP had instructed her to take 60 mg daily until she was seen in the office. Her legs are weeping and extremely swollen. CXR without congestion and recent BNP was normal from last week.     She is relatively bed bound and uses a walker at home. She does live alone and it is difficulty for her to go to the Drs office.     Per my review, she has had prior intolerance to losartan due to a rash and had severe lower extreme edema with  "amlodipine.    PMHx: Hypertension, hyperlipidemia, MCA stroke 2017      REVIEW OF SYSTEMS   Review of Systems   Constitutional: Positive for malaise/fatigue and weight gain.   Cardiovascular:  Positive for leg swelling. Negative for chest pain, cyanosis, dyspnea on exertion, irregular heartbeat, orthopnea, palpitations, paroxysmal nocturnal dyspnea and syncope.   Neurological:  Negative for dizziness, light-headedness and weakness.     PHYSICAL EXAMINATION     Vital Signs:  /70 (BP Location: Left arm)   Pulse 84   Ht 157.5 cm (62\")   Wt 68 kg (150 lb) Comment: Verbakl  SpO2 99% Comment: 2Liters  BMI 27.44 kg/m²   Estimated body mass index is 27.44 kg/m² as calculated from the following:    Height as of this encounter: 157.5 cm (62\").    Weight as of this encounter: 68 kg (150 lb).             Physical Exam  Constitutional:       Appearance: Normal appearance.   HENT:      Head: Normocephalic and atraumatic.   Cardiovascular:      Rate and Rhythm: Normal rate and regular rhythm.      Pulses: Normal pulses.      Heart sounds: Normal heart sounds.   Systolic murmur is present with a grade of 3/6.   Pulmonary:      Effort: Pulmonary effort is normal.      Breath sounds: Normal breath sounds.   Musculoskeletal:      Right lower le+ Edema present.      Left lower le+ Edema present.   Skin:     General: Skin is warm and dry.   Neurological:      General: No focal deficit present.      Mental Status: She is alert and oriented to person, place, and time.        Cardiac Testing/Results     Cardiac Testing:   - Echo 2022: Normal LV function with EF of 66 to 70%.  Normal LV size.  Grade 1 diastolic dysfunction.  Mild aortic valve regurgitation with mild aortic valve stenosis.  Mild MR with mild valve stenosis.  Aortic valve area is 1.63 cm²    -Stress Test 2019: EF 70% with small to medium size ischemia of the lateral wall.    Result Review :  The following data was reviewed by: Nate ESCALONA" RJ Lovett on 09/21/2023:       Lab Results   Component Value Date     (L) 12/13/2022     12/07/2022    K 4.2 12/13/2022    K 4.0 12/07/2022    CL 97 (L) 12/13/2022     12/07/2022    CO2 25.2 12/13/2022    CO2 24.9 12/07/2022    BUN 10 12/13/2022    BUN 16 12/07/2022    CREATININE 0.77 12/13/2022    CREATININE 0.74 12/07/2022    EGFRIFNONA 49 (L) 04/23/2020    EGFRIFNONA 60 (L) 05/09/2019    GLUCOSE 121 (H) 12/13/2022    GLUCOSE 96 12/07/2022    CALCIUM 9.0 12/13/2022    CALCIUM 8.4 12/07/2022    ALBUMIN 3.20 (L) 12/13/2022    ALBUMIN 4.00 12/05/2022    AST 37 (H) 12/13/2022    AST 25 12/05/2022    ALT 15 12/13/2022    ALT 12 12/05/2022     Lab Results   Component Value Date    WBC 7.88 09/15/2023    WBC 7.88 04/06/2023    HGB 10.9 (L) 09/15/2023    HGB 11.7 (L) 04/06/2023    HCT 35.7 (L) 09/15/2023    HCT 36.9 04/06/2023    MCV 92.7 09/15/2023    MCV 90.4 04/06/2023     09/15/2023     04/06/2023     Lab Results   Component Value Date    PROBNP 326.9 12/13/2022    PROBNP 283.4 12/05/2022    BNP 45.3 09/15/2023    BNP 59.9 07/06/2022     Lab Results   Component Value Date    CKTOTAL 55 04/23/2020    TROPONINI <0.010 07/06/2022    TROPONINT <0.010 12/13/2022     Lab Results   Component Value Date    TSH 1.750 10/13/2021    TSH 1.470 04/13/2021                          ASSESSMENT & PLAN       Diagnoses and all orders for this visit:    1. Lower extremity edema (Primary)  She has been unable to tolerate up titration of amlodipine previously due to lower extremity edema.  Discontinue amlodipine.   Stop lasix, start Bumex 1 mg x 7days then take every other day  Start compression warping during day, elevate legs.   Echocardiogram completed last year says normal ejection fraction with grade 1 diastolic dysfunction  Recent CXR without edema and BNP normal.  2. PAF (paroxysmal atrial fibrillation)  [id number] score 6; continue Xarelto.  Currently in sinus rhythm.  Continue rate control  with increased dose of Coreg 6.25 mg twice daily.  3. Mixed hyperlipidemia  Continue statin  She has history of a prior known abnormal stress test with recommendations to continue medical therapy given her age.  She is on a beta-blocker, statin and aspirin.  4. Essential hypertension  Keep blood pressure log at home.  Given her age we would recommend a blood pressure in the 120s to 145 over 60s to 80s.  Stop amlodipine.   Start coreg at higher dose of 6.25 mg BID.   5. E. coli UTI (urinary tract infection)  Recurrent UTI with inability to undergo cystoscopy.  Previously followed with urology.  This leads to significant incontinence and patient being unable to take a diuretic daily.  6. Cerebrovascular accident (CVA) due to embolism of right middle cerebral artery  Occurring in May 2017.  Subsequent Holter monitor showed area atrial fibrillation.  She has been maintained on anticoagulation since with Xarelto 15 mg daily.  Continue statin.      Follow Up:  Return in about 3 months (around 12/21/2023).  Patient was given instructions and counseling regarding her condition or for health maintenance advice. Please contact office if worsening symptoms or proceed to ER when appropriate.      Nate Lovett PA-C  09/21/23  14:38 EDT    MEDICATIONS         Discharge Medications            Accurate as of September 21, 2023  2:38 PM. If you have any questions, ask your nurse or doctor.                New Medications        Instructions Start Date   bumetanide 1 MG tablet  Commonly known as: BUMEX  Started by: Nate Lovett PA-C   1 mg, Oral, Daily             Changes to Medications        Instructions Start Date   carvedilol 6.25 MG tablet  Commonly known as: COREG  What changed: how much to take  Changed by: Naet Lovett PA-C   6.25 mg, Oral, 2 Times Daily With Meals             Continue These Medications        Instructions Start Date   cefdinir 300 MG capsule  Commonly known as: OMNICEF   300 mg, Oral,  2 Times Daily      famotidine 40 MG tablet  Commonly known as: PEPCID   40 mg, Oral, Daily      isosorbide mononitrate 30 MG 24 hr tablet  Commonly known as: IMDUR   30 mg, Oral, Every Morning      levothyroxine 75 MCG tablet  Commonly known as: SYNTHROID, LEVOTHROID   75 mcg, Oral, Daily      miconazole 2 % powder  Commonly known as: MICOTIN   Topical, As Needed      O2  Commonly known as: OXYGEN   2 L/min, Inhalation, Once      simvastatin 40 MG tablet  Commonly known as: ZOCOR   Nightly      traMADol 50 MG tablet  Commonly known as: ULTRAM   50 mg, Oral, Every 6 Hours PRN      Vitamin B Complex tablet   1 tablet, Oral, Daily      VITAMIN D PO   1 tablet, Oral, Daily      Xarelto 15 MG tablet  Generic drug: rivaroxaban   TAKE ONE TABLET BY MOUTH DAILY                   **Morgan Disclaimer: This note was dictated using an electronic transcription. The electronic translation of spoken language may permit erroneous, or at times, nonsensical words or phrases to be inadvertently transcribed. Although I have reviewed the note for such errors, some may still exist.

## 2023-09-28 ENCOUNTER — TELEPHONE (OUTPATIENT)
Dept: CARDIOLOGY | Facility: CLINIC | Age: 88
End: 2023-09-28
Payer: MEDICARE

## 2023-09-28 DIAGNOSIS — R60.0 LOWER EXTREMITY EDEMA: Primary | ICD-10-CM

## 2023-09-28 RX ORDER — SPIRONOLACTONE 25 MG/1
25 TABLET ORAL DAILY
Qty: 30 TABLET | Refills: 1 | Status: SHIPPED | OUTPATIENT
Start: 2023-09-28

## 2023-09-28 NOTE — TELEPHONE ENCOUNTER
----- Message from Nate Crook PA-C sent at 9/21/2023  2:39 PM EDT -----  Regarding: edema  Call to ask about leg swelling, stopped amlodipine and started bumex

## 2023-09-28 NOTE — TELEPHONE ENCOUNTER
Stop bumex due to severe diarrhea with patient is taking this medication, this was also occurring with lasix.     Start spironolactone 25 mg daily.       Leg swelling is ongoing and not improved. Will check in with her next week.

## 2023-10-05 ENCOUNTER — TELEPHONE (OUTPATIENT)
Dept: CARDIOLOGY | Facility: CLINIC | Age: 88
End: 2023-10-05
Payer: MEDICARE

## 2023-10-05 NOTE — TELEPHONE ENCOUNTER
Called patient, she is tolerating the spironolactone. Will increase it to two tablets daily x 1 week then continue with 1 tablet daily. Leg swelling is improving but pedal edema remains unchanged. Will check in with her again next week

## 2023-10-12 ENCOUNTER — TELEPHONE (OUTPATIENT)
Dept: CARDIOLOGY | Facility: CLINIC | Age: 88
End: 2023-10-12
Payer: MEDICARE

## 2023-10-12 NOTE — TELEPHONE ENCOUNTER
She is continuing to tolerate taking her spironolactone.  Edema in her legs is completely resolved.  She continues to have edema on her bilateral feet.  She has been compliant in doing the Ace wraps as well as elevating her feet.  We will plan to increase her spironolactone to 25 mg twice daily for  5 days and I will check in with her again next week to see if the pedal edema has resolved.

## 2023-10-19 NOTE — PROGRESS NOTES
Called patient to discuss, her pedal edema has improved significantly.  She is back to taking spironolactone 25 mg daily.  We will continue this regimen for now.  She will call us back if she needs anything else

## 2024-01-09 ENCOUNTER — TELEPHONE (OUTPATIENT)
Dept: CARDIOLOGY | Facility: CLINIC | Age: 89
End: 2024-01-09
Payer: MEDICARE

## 2024-01-09 NOTE — TELEPHONE ENCOUNTER
Patient's daughter called, Doris is needing a refill on Xarelto, and isosorbide sent to [facility]

## 2024-01-10 ENCOUNTER — TELEPHONE (OUTPATIENT)
Dept: CARDIOLOGY | Facility: CLINIC | Age: 89
End: 2024-01-10
Payer: MEDICARE

## 2024-01-10 RX ORDER — ISOSORBIDE MONONITRATE 30 MG/1
30 TABLET, EXTENDED RELEASE ORAL EVERY MORNING
Qty: 90 TABLET | Refills: 3 | Status: SHIPPED | OUTPATIENT
Start: 2024-01-10

## 2024-03-01 ENCOUNTER — HOSPITAL ENCOUNTER (EMERGENCY)
Facility: HOSPITAL | Age: 89
Discharge: HOME | End: 2024-03-01
Attending: EMERGENCY MEDICINE
Payer: MEDICARE

## 2024-03-01 VITALS
OXYGEN SATURATION: 98 % | TEMPERATURE: 98.1 F | BODY MASS INDEX: 30.21 KG/M2 | HEART RATE: 81 BPM | HEIGHT: 61 IN | DIASTOLIC BLOOD PRESSURE: 85 MMHG | SYSTOLIC BLOOD PRESSURE: 203 MMHG | WEIGHT: 160 LBS | RESPIRATION RATE: 18 BRPM

## 2024-03-01 DIAGNOSIS — R30.0 DYSURIA: Primary | ICD-10-CM

## 2024-03-01 DIAGNOSIS — I10 HYPERTENSION, UNSPECIFIED TYPE: ICD-10-CM

## 2024-03-01 LAB
BILIRUB UR QL STRIP: NEGATIVE
CLARITY UR: CLEAR
CLUE CELLS SPEC QL WET PREP: NORMAL
COLOR UR: YELLOW
GLUCOSE UR STRIP-MCNC: NEGATIVE MG/DL
HGB UR QL STRIP.AUTO: ABNORMAL
HYDATID CYST SPEC WET PREP: NORMAL
KETONES UR QL STRIP: NEGATIVE
KOH PREP NAIL: NORMAL
LEUKOCYTE ESTERASE UR QL STRIP.AUTO: NEGATIVE
NITRITE UR QL STRIP: NEGATIVE
PH UR STRIP.AUTO: 6.5 [PH] (ref 4.5–8)
PROT UR QL STRIP: ABNORMAL
SP GR UR STRIP: 1.02 (ref 1–1.03)
T VAGINALIS SPEC QL WET PREP: NORMAL
UROBILINOGEN UR QL STRIP: ABNORMAL
WBC SPEC QL WET PREP: NORMAL
YEAST GENITAL QL WET PREP: NORMAL

## 2024-03-01 PROCEDURE — 87220 TISSUE EXAM FOR FUNGI: CPT | Performed by: EMERGENCY MEDICINE

## 2024-03-01 PROCEDURE — 87210 SMEAR WET MOUNT SALINE/INK: CPT | Performed by: EMERGENCY MEDICINE

## 2024-03-01 PROCEDURE — 99283 EMERGENCY DEPT VISIT LOW MDM: CPT

## 2024-03-01 PROCEDURE — 81003 URINALYSIS AUTO W/O SCOPE: CPT | Performed by: EMERGENCY MEDICINE

## 2024-03-01 NOTE — ED PROVIDER NOTES
Subjective   History of Present Illness  With daughter c/o dysuria, denies any other symptoms        Review of Systems   All other systems reviewed and are negative.      Past Medical History:   Diagnosis Date    Carotid artery stenosis     Chronic renal insufficiency     Compression fracture of lumbar vertebra     Difficulty breathing     Fatigue     H/O echocardiogram 05/20/2015    Health care maintenance     History of EKG 09/25/2015    Hyperlipidemia     Hypertension     Hypothyroidism     PAF (paroxysmal atrial fibrillation)     Stroke     Transfusion history 07/2019       Allergies   Allergen Reactions    Azithromycin     Levofloxacin     Losartan     Losartan Potassium     Macrodantin [Nitrofurantoin] Rash       Past Surgical History:   Procedure Laterality Date    CATARACT EXTRACTION      CHOLECYSTECTOMY      HERNIA REPAIR         Family History   Problem Relation Age of Onset    Heart failure Mother        Social History     Socioeconomic History    Marital status:    Tobacco Use    Smoking status: Never    Smokeless tobacco: Never   Vaping Use    Vaping Use: Never used   Substance and Sexual Activity    Alcohol use: No     Comment: caffeine use: 2 cups daily.     Drug use: No    Sexual activity: Defer           Objective   Physical Exam  Vitals and nursing note reviewed.   Constitutional:       Appearance: Normal appearance. She is not ill-appearing or diaphoretic.   HENT:      Head: Normocephalic and atraumatic.      Nose: Nose normal. No rhinorrhea.   Eyes:      Extraocular Movements: Extraocular movements intact.      Conjunctiva/sclera: Conjunctivae normal.      Pupils: Pupils are equal, round, and reactive to light.   Cardiovascular:      Rate and Rhythm: Normal rate and regular rhythm.      Pulses: Normal pulses.      Heart sounds: Normal heart sounds.   Pulmonary:      Effort: Pulmonary effort is normal.      Breath sounds: Normal breath sounds.   Abdominal:      General: Abdomen is flat.       Palpations: Abdomen is soft.      Tenderness: There is no abdominal tenderness. There is no guarding.   Musculoskeletal:         General: Normal range of motion.      Cervical back: Normal range of motion and neck supple.      Right lower leg: Edema present.      Left lower leg: Edema present.      Comments: Edema baseline per daughter no worse   Skin:     General: Skin is warm and dry.      Capillary Refill: Capillary refill takes less than 2 seconds.   Neurological:      General: No focal deficit present.      Mental Status: She is alert and oriented to person, place, and time. Mental status is at baseline.   Psychiatric:         Mood and Affect: Mood normal.         Behavior: Behavior normal.         Procedures           ED Course  ED Course as of 03/01/24 2238   Fri Mar 01, 2024   1717 External vag exam, red irritation, no swelling or lesions [BC]   1757 Discussed results with pt/daughter, reports h/o urinary incontinence, likely irritation from moisture and wipes, will f/u urology [BC]      ED Course User Index  [BC] Justen Osullivan MD      Elev bp, has meds at home, will take, asymptomatic, denies any cp/soa/neuro symptoms                                       Medical Decision Making  Problems Addressed:  Dysuria: complicated acute illness or injury  Hypertension, unspecified type: complicated acute illness or injury    Amount and/or Complexity of Data Reviewed  Labs: ordered.        Final diagnoses:   Dysuria   Hypertension, unspecified type       ED Disposition  ED Disposition       ED Disposition   Discharge    Condition   Stable    Comment   --               Urology    Schedule an appointment as soon as possible for a visit       [facility] EMERGENCY DEPARTMENT  [address] [facility] [address] [phone]    As needed, If symptoms worsen         Medication List      No changes were made to your prescriptions during this visit.            Justen Osullivan,  MD  03/01/24 1758       Justen Osullivan MD  03/01/24 1820       Justen Osullivan MD  03/01/24 1828       Justen Osullivan MD  03/01/24 7625

## 2024-04-30 ENCOUNTER — APPOINTMENT (OUTPATIENT)
Dept: GENERAL RADIOLOGY | Facility: HOSPITAL | Age: 89
End: 2024-04-30
Payer: MEDICARE

## 2024-04-30 ENCOUNTER — HOSPITAL ENCOUNTER (EMERGENCY)
Facility: HOSPITAL | Age: 89
Discharge: HOME | End: 2024-04-30
Attending: STUDENT IN AN ORGANIZED HEALTH CARE EDUCATION/TRAINING PROGRAM
Payer: MEDICARE

## 2024-04-30 VITALS
HEART RATE: 80 BPM | DIASTOLIC BLOOD PRESSURE: 78 MMHG | RESPIRATION RATE: 20 BRPM | BODY MASS INDEX: 29.07 KG/M2 | TEMPERATURE: 98.2 F | HEIGHT: 61 IN | WEIGHT: 154 LBS | SYSTOLIC BLOOD PRESSURE: 148 MMHG | OXYGEN SATURATION: 99 %

## 2024-04-30 DIAGNOSIS — J18.9 PNEUMONIA OF BOTH LOWER LOBES DUE TO INFECTIOUS ORGANISM: Primary | ICD-10-CM

## 2024-04-30 LAB
FLUAV RNA RESP QL NAA+PROBE: NOT DETECTED
FLUBV RNA RESP QL NAA+PROBE: NOT DETECTED
SARS-COV-2 RNA RESP QL NAA+PROBE: NOT DETECTED

## 2024-04-30 PROCEDURE — 63710000001 ONDANSETRON ODT 4 MG TABLET DISPERSIBLE: Performed by: STUDENT IN AN ORGANIZED HEALTH CARE EDUCATION/TRAINING PROGRAM

## 2024-04-30 PROCEDURE — 87636 SARSCOV2 & INF A&B AMP PRB: CPT | Performed by: STUDENT IN AN ORGANIZED HEALTH CARE EDUCATION/TRAINING PROGRAM

## 2024-04-30 PROCEDURE — 71045 X-RAY EXAM CHEST 1 VIEW: CPT

## 2024-04-30 PROCEDURE — 99283 EMERGENCY DEPT VISIT LOW MDM: CPT

## 2024-04-30 PROCEDURE — 73562 X-RAY EXAM OF KNEE 3: CPT

## 2024-04-30 RX ORDER — ONDANSETRON 4 MG/1
4 TABLET, ORALLY DISINTEGRATING ORAL EVERY 8 HOURS PRN
Qty: 9 TABLET | Refills: 0 | Status: SHIPPED | OUTPATIENT
Start: 2024-04-30 | End: 2024-05-03

## 2024-04-30 RX ORDER — ONDANSETRON 4 MG/1
4 TABLET, ORALLY DISINTEGRATING ORAL ONCE
Status: COMPLETED | OUTPATIENT
Start: 2024-04-30 | End: 2024-04-30

## 2024-04-30 RX ORDER — AMOXICILLIN AND CLAVULANATE POTASSIUM 875; 125 MG/1; MG/1
1 TABLET, FILM COATED ORAL ONCE
Status: COMPLETED | OUTPATIENT
Start: 2024-04-30 | End: 2024-04-30

## 2024-04-30 RX ORDER — AMOXICILLIN AND CLAVULANATE POTASSIUM 875; 125 MG/1; MG/1
1 TABLET, FILM COATED ORAL 2 TIMES DAILY
Qty: 14 TABLET | Refills: 0 | Status: SHIPPED | OUTPATIENT
Start: 2024-04-30 | End: 2024-05-07

## 2024-04-30 RX ORDER — ACETAMINOPHEN 500 MG
1000 TABLET ORAL ONCE
Status: COMPLETED | OUTPATIENT
Start: 2024-04-30 | End: 2024-04-30

## 2024-04-30 RX ORDER — DOXYCYCLINE 100 MG/1
100 CAPSULE ORAL ONCE
Status: COMPLETED | OUTPATIENT
Start: 2024-04-30 | End: 2024-04-30

## 2024-04-30 RX ORDER — DOXYCYCLINE 100 MG/1
100 CAPSULE ORAL 2 TIMES DAILY
Qty: 14 CAPSULE | Refills: 0 | Status: SHIPPED | OUTPATIENT
Start: 2024-04-30 | End: 2024-05-07

## 2024-04-30 RX ADMIN — ONDANSETRON 4 MG: 4 TABLET, ORALLY DISINTEGRATING ORAL at 13:06

## 2024-04-30 RX ADMIN — DOXYCYCLINE 100 MG: 100 CAPSULE ORAL at 13:06

## 2024-04-30 RX ADMIN — ONDANSETRON 4 MG: 4 TABLET, ORALLY DISINTEGRATING ORAL at 12:07

## 2024-04-30 RX ADMIN — AMOXICILLIN AND CLAVULANATE POTASSIUM 1 TABLET: 875; 125 TABLET, FILM COATED ORAL at 13:06

## 2024-04-30 RX ADMIN — ACETAMINOPHEN 1000 MG: 500 TABLET ORAL at 12:07

## 2024-04-30 NOTE — ED PROVIDER NOTES
Subjective   History of Present Illness  Pt is a 89 y.o. female with PMH as listed who presents for   Chief Complaint   Patient presents with    Cough     [facility]IB EMS from [facility], there for knee pain but sent to ed for cough. Cough x3 knee pain since yesterday. 2L NC @ baseline        Patient is a 89-year-old female presents for cough and right knee pain.  She states that she has had 3 days of cough, rhinorrhea, congestion.  States cough is nonproductive.  No chest pain or shortness of breath associated with this.  No fevers.  She is on 2 L of oxygen at baseline and has not had to increase this.  She also fell from her chair yesterday and sustained injury to her medial right knee, is had pain since her fall yesterday.  Denies hitting her head or LOC associated with her fall.  Has no other injuries associated with this requiring intervention.  No other new complaints currently.    Review of Systems    Past Medical History:   Diagnosis Date    Carotid artery stenosis     Chronic renal insufficiency     Compression fracture of lumbar vertebra     Difficulty breathing     Fatigue     H/O echocardiogram 05/20/2015    Health care maintenance     History of EKG 09/25/2015    Hyperlipidemia     Hypertension     Hypothyroidism     PAF (paroxysmal atrial fibrillation)     Stroke     Transfusion history 07/2019       Allergies   Allergen Reactions    Azithromycin     Levofloxacin     Losartan     Losartan Potassium     Macrodantin [Nitrofurantoin] Rash       Past Surgical History:   Procedure Laterality Date    CATARACT EXTRACTION      CHOLECYSTECTOMY      HERNIA REPAIR         Family History   Problem Relation Age of Onset    Heart failure Mother        Social History     Socioeconomic History    Marital status:    Tobacco Use    Smoking status: Never    Smokeless tobacco: Never   Vaping Use    Vaping status: Never Used   Substance and Sexual Activity    Alcohol use: No     Comment: caffeine use: 2 cups daily.     Drug  use: No    Sexual activity: Defer           Objective   Physical Exam  Constitutional:       Appearance: Normal appearance.   HENT:      Head: Normocephalic and atraumatic.      Mouth/Throat:      Mouth: Mucous membranes are moist.      Pharynx: Oropharynx is clear.   Eyes:      Conjunctiva/sclera: Conjunctivae normal.   Cardiovascular:      Rate and Rhythm: Normal rate and regular rhythm.   Pulmonary:      Effort: Pulmonary effort is normal.      Breath sounds: Normal breath sounds.   Abdominal:      General: Abdomen is flat.      Palpations: Abdomen is soft.   Musculoskeletal:      Cervical back: Neck supple.      Comments: Mildly tender medial right knee, range of motion intact.  NVM is intact distally though there is significant swelling to bilateral lower extremities which is at baseline per patient.   Skin:     General: Skin is warm and dry.   Neurological:      Mental Status: She is alert.   Psychiatric:         Mood and Affect: Mood normal.         Procedures           ED Course  ED Course as of 04/30/24 1301   Tue Apr 30, 2024   1145 Patient is a 89-year-old female who presents for cough rhinorrhea and congestion for the past several days.  Also planing of right knee pain after a mechanical fall from her chair yesterday.  Exam is unremarkable.  Will obtain COVID and flu swab as well as x-ray of right knee and chest.  Patient given Tylenol and ODT Zofran. [JF]   1300 Chest x-ray is concerning for pneumonia, COVID and flu negative and x-ray of knee negative for any acute findings.  Discussed with patient and daughter at bedside possibility of more lab work to evaluate WBC, BUN, procalcitonin, etc. evaluate for possible need for admission.  They are deferring lab work at this time.  Believe this is appropriate given patient's normal heart rate, temperature, on home O2 with normal saturation and normal blood pressure.  Discussed with her plan for discharge with PCP closely following up in 1 to 2 days and  prescriptions for Augmentin and doxycycline and Zofran provided.  Given antibiotics here prior to discharge.  All questions answered. [JF]      ED Course User Index  [JF] Luis Vicente MD                                             Medical Decision Making  My differential diagnosis for cough includes but is not limited to:  Upper respiratory infection, bronchitis, pneumonia, COPD exacerbation, cough variant asthma, cardiac asthma, coronary artery disease, congestive heart failure, bacterial, viral or lung infections, lung cancer, aspiration pneumonitis, aspiration of foreign body and Covid -19            Problems Addressed:  Pneumonia of both lower lobes due to infectious organism: complicated acute illness or injury    Amount and/or Complexity of Data Reviewed  Labs: ordered.     Details: COVID and flu negative  Radiology: ordered.     Details: Chest x-ray shows bilateral lower lobe infiltrates concerning for developing pneumonia patient interpretation.  Knee x-ray negative for any acute findings.    Risk  OTC drugs.  Prescription drug management.        Final diagnoses:   Pneumonia of both lower lobes due to infectious organism       ED Disposition  ED Disposition       ED Disposition   Discharge    Condition   Stable    Comment   --               Leeann Smith MD  [address] [address]  [phone]    Schedule an appointment as soon as possible for a visit in 1 day  For re-evaluation         Medication List        New Prescriptions      amoxicillin-clavulanate 875-125 MG per tablet  Commonly known as: AUGMENTIN  Take 1 tablet by mouth 2 (Two) Times a Day for 7 days.     doxycycline 100 MG capsule  Commonly known as: MONODOX  Take 1 capsule by mouth 2 (Two) Times a Day for 7 days.     ondansetron ODT 4 MG disintegrating tablet  Commonly known as: ZOFRAN-ODT  Place 1 tablet on the tongue Every 8 (Eight) Hours As Needed for Nausea for up to 3 days.               Where to  Get Your Medications        These medications were sent to [facility] [id number] - [place] - [address] - [phone]  - [phone] FX  [address], [place] [address]      Phone: [phone]   amoxicillin-clavulanate 875-125 MG per tablet  doxycycline 100 MG capsule  ondansetron ODT 4 MG disintegrating tablet            Luis Vicente MD  04/30/24 1306

## 2024-12-05 ENCOUNTER — TELEPHONE (OUTPATIENT)
Age: 89
End: 2024-12-05
Payer: MEDICARE

## 2024-12-05 NOTE — TELEPHONE ENCOUNTER
Received message that pt's daughter, Jaimee had questions regarding pt's diuretic.    Spoke with pt's daughter, Jaimee and she stated pt is now at [facility] and they have been giving her two different diuretics. Jaimee stated they have been giving pt Spironolactone and Furosemide.     I advised per pt's chart, she should only be taking Spironolactone. Jaimee verbalized understanding and appreciation.    SMILEY Hernadez